# Patient Record
Sex: FEMALE | Race: WHITE | NOT HISPANIC OR LATINO | Employment: UNEMPLOYED | URBAN - METROPOLITAN AREA
[De-identification: names, ages, dates, MRNs, and addresses within clinical notes are randomized per-mention and may not be internally consistent; named-entity substitution may affect disease eponyms.]

---

## 2017-03-31 ENCOUNTER — APPOINTMENT (OUTPATIENT)
Dept: WOMENS IMAGING | Facility: HOSPITAL | Age: 57
End: 2017-03-31

## 2017-03-31 PROCEDURE — 77067 SCR MAMMO BI INCL CAD: CPT | Performed by: RADIOLOGY

## 2017-03-31 PROCEDURE — 77063 BREAST TOMOSYNTHESIS BI: CPT | Performed by: RADIOLOGY

## 2017-04-12 ENCOUNTER — APPOINTMENT (OUTPATIENT)
Dept: WOMENS IMAGING | Facility: HOSPITAL | Age: 57
End: 2017-04-12

## 2017-04-12 PROCEDURE — G0279 TOMOSYNTHESIS, MAMMO: HCPCS | Performed by: RADIOLOGY

## 2017-04-12 PROCEDURE — G0206 DX MAMMO INCL CAD UNI: HCPCS | Performed by: RADIOLOGY

## 2017-04-12 PROCEDURE — 77065 DX MAMMO INCL CAD UNI: CPT | Performed by: RADIOLOGY

## 2017-04-12 PROCEDURE — 76641 ULTRASOUND BREAST COMPLETE: CPT | Performed by: RADIOLOGY

## 2017-04-19 ENCOUNTER — APPOINTMENT (OUTPATIENT)
Dept: WOMENS IMAGING | Facility: HOSPITAL | Age: 57
End: 2017-04-19

## 2017-04-19 PROCEDURE — 76942 ECHO GUIDE FOR BIOPSY: CPT | Performed by: RADIOLOGY

## 2017-04-19 PROCEDURE — 19000 PUNCTURE ASPIR CYST BREAST: CPT | Performed by: RADIOLOGY

## 2017-05-18 ENCOUNTER — AMBULATORY SURGICAL CENTER (OUTPATIENT)
Dept: URBAN - METROPOLITAN AREA SURGERY 20 | Facility: SURGERY | Age: 57
End: 2017-05-18

## 2017-05-18 ENCOUNTER — OFFICE (OUTPATIENT)
Dept: URBAN - METROPOLITAN AREA CLINIC 64 | Facility: CLINIC | Age: 57
End: 2017-05-18

## 2017-05-18 DIAGNOSIS — Z87.19 PERSONAL HISTORY OF OTHER DISEASES OF THE DIGESTIVE SYSTEM: ICD-10-CM

## 2017-05-18 DIAGNOSIS — K22.70 BARRETT'S ESOPHAGUS WITHOUT DYSPLASIA: ICD-10-CM

## 2017-05-18 DIAGNOSIS — K29.70 GASTRITIS, UNSPECIFIED, WITHOUT BLEEDING: ICD-10-CM

## 2017-05-18 DIAGNOSIS — D12.9 BENIGN NEOPLASM OF ANUS AND ANAL CANAL: ICD-10-CM

## 2017-05-18 DIAGNOSIS — K52.9 NONINFECTIVE GASTROENTERITIS AND COLITIS, UNSPECIFIED: ICD-10-CM

## 2017-05-18 PROCEDURE — 88305 TISSUE EXAM BY PATHOLOGIST: CPT | Performed by: INTERNAL MEDICINE

## 2017-05-18 PROCEDURE — 45380 COLONOSCOPY AND BIOPSY: CPT | Performed by: INTERNAL MEDICINE

## 2017-05-19 PROBLEM — K21.9 GASTROESOPHAGEAL REFLUX DISEASE: Status: ACTIVE | Noted: 2017-05-19

## 2017-05-19 PROBLEM — K51.90 ULCERATIVE COLITIS: Status: ACTIVE | Noted: 2017-05-19

## 2017-12-18 ENCOUNTER — APPOINTMENT (OUTPATIENT)
Dept: WOMENS IMAGING | Facility: HOSPITAL | Age: 57
End: 2017-12-18

## 2017-12-18 PROCEDURE — G0279 TOMOSYNTHESIS, MAMMO: HCPCS | Performed by: RADIOLOGY

## 2017-12-18 PROCEDURE — 77065 DX MAMMO INCL CAD UNI: CPT | Performed by: RADIOLOGY

## 2017-12-18 PROCEDURE — 76641 ULTRASOUND BREAST COMPLETE: CPT | Performed by: RADIOLOGY

## 2017-12-18 PROCEDURE — G0206 DX MAMMO INCL CAD UNI: HCPCS | Performed by: RADIOLOGY

## 2017-12-18 PROCEDURE — 77061 BREAST TOMOSYNTHESIS UNI: CPT | Performed by: RADIOLOGY

## 2018-01-15 ENCOUNTER — OFFICE VISIT (OUTPATIENT)
Dept: FAMILY MEDICINE CLINIC | Facility: CLINIC | Age: 58
End: 2018-01-15

## 2018-01-15 VITALS
RESPIRATION RATE: 12 BRPM | BODY MASS INDEX: 30.53 KG/M2 | DIASTOLIC BLOOD PRESSURE: 90 MMHG | HEIGHT: 66 IN | OXYGEN SATURATION: 98 % | SYSTOLIC BLOOD PRESSURE: 142 MMHG | HEART RATE: 71 BPM | WEIGHT: 190 LBS

## 2018-01-15 DIAGNOSIS — E04.9 GOITER: Primary | ICD-10-CM

## 2018-01-15 DIAGNOSIS — K51.919 ULCERATIVE COLITIS WITH COMPLICATION, UNSPECIFIED LOCATION (HCC): ICD-10-CM

## 2018-01-15 DIAGNOSIS — Z80.8 FAMILY HISTORY OF MELANOMA: ICD-10-CM

## 2018-01-15 DIAGNOSIS — R53.83 FATIGUE, UNSPECIFIED TYPE: ICD-10-CM

## 2018-01-15 DIAGNOSIS — H93.13 TINNITUS OF BOTH EARS: ICD-10-CM

## 2018-01-15 DIAGNOSIS — Z13.1 SCREENING FOR DIABETES MELLITUS: ICD-10-CM

## 2018-01-15 DIAGNOSIS — E55.9 VITAMIN D DEFICIENCY: ICD-10-CM

## 2018-01-15 DIAGNOSIS — E78.5 HYPERLIPIDEMIA, UNSPECIFIED HYPERLIPIDEMIA TYPE: ICD-10-CM

## 2018-01-15 DIAGNOSIS — I10 ESSENTIAL HYPERTENSION: ICD-10-CM

## 2018-01-15 DIAGNOSIS — F32.A DEPRESSION, UNSPECIFIED DEPRESSION TYPE: ICD-10-CM

## 2018-01-15 LAB
25(OH)D3+25(OH)D2 SERPL-MCNC: 71.1 NG/ML (ref 30–100)
ALBUMIN SERPL-MCNC: 4.5 G/DL (ref 3.5–5.2)
ALBUMIN/GLOB SERPL: 1.4 G/DL
ALP SERPL-CCNC: 77 U/L (ref 39–117)
ALT SERPL-CCNC: 11 U/L (ref 1–33)
AST SERPL-CCNC: 18 U/L (ref 1–32)
BASOPHILS # BLD AUTO: 0.03 10*3/MM3 (ref 0–0.2)
BASOPHILS NFR BLD AUTO: 0.4 % (ref 0–1.5)
BILIRUB SERPL-MCNC: 0.3 MG/DL (ref 0.1–1.2)
BUN SERPL-MCNC: 14 MG/DL (ref 6–20)
BUN/CREAT SERPL: 16.1 (ref 7–25)
CALCIUM SERPL-MCNC: 9.7 MG/DL (ref 8.6–10.5)
CHLORIDE SERPL-SCNC: 100 MMOL/L (ref 98–107)
CHOLEST SERPL-MCNC: 201 MG/DL (ref 0–200)
CO2 SERPL-SCNC: 29.1 MMOL/L (ref 22–29)
CREAT SERPL-MCNC: 0.87 MG/DL (ref 0.57–1)
EOSINOPHIL # BLD AUTO: 0.1 10*3/MM3 (ref 0–0.7)
EOSINOPHIL NFR BLD AUTO: 1.5 % (ref 0.3–6.2)
ERYTHROCYTE [DISTWIDTH] IN BLOOD BY AUTOMATED COUNT: 13.3 % (ref 11.7–13)
FOLATE SERPL-MCNC: 14.31 NG/ML (ref 4.78–24.2)
GLOBULIN SER CALC-MCNC: 3.2 GM/DL
GLUCOSE SERPL-MCNC: 86 MG/DL (ref 65–99)
HBA1C MFR BLD: 5.11 % (ref 4.8–5.6)
HCT VFR BLD AUTO: 40.5 % (ref 35.6–45.5)
HDLC SERPL-MCNC: 48 MG/DL (ref 40–60)
HGB BLD-MCNC: 13.4 G/DL (ref 11.9–15.5)
IMM GRANULOCYTES # BLD: 0.02 10*3/MM3 (ref 0–0.03)
IMM GRANULOCYTES NFR BLD: 0.3 % (ref 0–0.5)
LDLC SERPL CALC-MCNC: 122 MG/DL (ref 0–100)
LDLC/HDLC SERPL: 2.54 {RATIO}
LYMPHOCYTES # BLD AUTO: 2.67 10*3/MM3 (ref 0.9–4.8)
LYMPHOCYTES NFR BLD AUTO: 39.5 % (ref 19.6–45.3)
MCH RBC QN AUTO: 31.5 PG (ref 26.9–32)
MCHC RBC AUTO-ENTMCNC: 33.1 G/DL (ref 32.4–36.3)
MCV RBC AUTO: 95.1 FL (ref 80.5–98.2)
MONOCYTES # BLD AUTO: 0.43 10*3/MM3 (ref 0.2–1.2)
MONOCYTES NFR BLD AUTO: 6.4 % (ref 5–12)
NEUTROPHILS # BLD AUTO: 3.51 10*3/MM3 (ref 1.9–8.1)
NEUTROPHILS NFR BLD AUTO: 51.9 % (ref 42.7–76)
PLATELET # BLD AUTO: 273 10*3/MM3 (ref 140–500)
POTASSIUM SERPL-SCNC: 4.3 MMOL/L (ref 3.5–5.2)
PROT SERPL-MCNC: 7.7 G/DL (ref 6–8.5)
RBC # BLD AUTO: 4.26 10*6/MM3 (ref 3.9–5.2)
SODIUM SERPL-SCNC: 140 MMOL/L (ref 136–145)
TRIGL SERPL-MCNC: 155 MG/DL (ref 0–150)
TSH SERPL DL<=0.005 MIU/L-ACNC: 1.37 MIU/ML (ref 0.27–4.2)
VIT B12 SERPL-MCNC: 374 PG/ML (ref 211–946)
VLDLC SERPL CALC-MCNC: 31 MG/DL (ref 5–40)
WBC # BLD AUTO: 6.76 10*3/MM3 (ref 4.5–10.7)

## 2018-01-15 PROCEDURE — 99214 OFFICE O/P EST MOD 30 MIN: CPT | Performed by: FAMILY MEDICINE

## 2018-01-15 RX ORDER — VALACYCLOVIR HYDROCHLORIDE 500 MG/1
500 TABLET, FILM COATED ORAL 2 TIMES DAILY
COMMUNITY
End: 2022-06-16 | Stop reason: SDUPTHER

## 2018-01-15 RX ORDER — LISINOPRIL 10 MG/1
10 TABLET ORAL DAILY
Qty: 30 TABLET | Refills: 1 | Status: SHIPPED | OUTPATIENT
Start: 2018-01-15 | End: 2018-02-06 | Stop reason: SINTOL

## 2018-01-15 RX ORDER — CITALOPRAM 20 MG/1
20 TABLET ORAL DAILY
Qty: 90 TABLET | Refills: 1 | Status: SHIPPED | OUTPATIENT
Start: 2018-01-15 | End: 2018-07-11 | Stop reason: SDUPTHER

## 2018-01-15 NOTE — PROGRESS NOTES
"Subjective   Rakelmara Velarde is a 57 y.o. female.     Chief Complaint   Patient presents with   • GERD   • Anxiety   • Tinnitus   • Hypertension   • Hyperlipidemia   • Establish Care       HPI       GERD:  -well controlled with Nexium  -EGD and colonoscopy UTD in May 2017 with GI, Dr. Linda Garzon  -she also has a hx of ulcerative colitis which is well controlled with Colazal    HLD:  -diagnosed a couple of years ago  -she is fasting for labs today  -diet could be better \"I have a sweet tooth\"  -no regular exercise    HTN:  -BP has juliet running high for a couple of years  -she has never taken BP medication but would be willing to start one today  -no chest pain, dizziness, headaches, or acute vision changes    Tinnitis:  -ringing and clicking in both ears  -progressive over the last few years  -associated with some difficulty with her hearing in large groups and understanding her daughters in conversation  -no ear pain or fullness    Pt reports a hx of anxiety and depression which is well controlled with Celexa 20 mg daily.  She requests a medication refill.  She reports that her marriage is strained but that she and her   are living together and trying to work it out. She spends a lot of time providing care for her grandchildren and her elderly parents.      Previous PCP, Dr. Sherin Win retired.  It has been about 1.5 yr since her last office visit.         Review of Systems   Constitutional: Negative for appetite change, fever and unexpected weight change.   HENT: Positive for hearing loss. Negative for ear discharge, ear pain, rhinorrhea and sore throat.    Eyes: Negative for pain and visual disturbance.   Respiratory: Negative for cough, chest tightness, shortness of breath and wheezing.    Cardiovascular: Negative for chest pain, palpitations and leg swelling.   Gastrointestinal: Negative for abdominal distention, abdominal pain, anal bleeding, blood in stool, constipation, diarrhea, nausea and vomiting. "   Genitourinary: Negative for dysuria, hematuria and menstrual problem.   Musculoskeletal: Negative for arthralgias and myalgias.   Skin: Negative for pallor and rash.   Neurological: Negative for dizziness and headaches.   Psychiatric/Behavioral: Negative for dysphoric mood and sleep disturbance. The patient is not nervous/anxious.        The following portions of the patient's history were reviewed and updated as appropriate: allergies, current medications, past family history, past medical history, past social history, past surgical history and problem list.    Past Medical History:   Diagnosis Date   • Angular cheilitis 2016   • Anxiety    • GERD (gastroesophageal reflux disease)    • HSV infection    • Hyperlipidemia    • Hypertension    • Obesity    • Osteopenia    • Ulcerative colitis     followed by Dr. Linda Garzon, Q2 yr colonoscopies   • Vitamin D deficiency      Past Surgical History:   Procedure Laterality Date   •  SECTION     • COLONOSCOPY  2011    Dr Garzon-redo in 3-5 years   • HYSTEROSCOPY W/ ENDOMETRIAL ABLATION     • MANDIBLE FRACTURE SURGERY     • TONSILLECTOMY     • TUBAL ABDOMINAL LIGATION     • WISDOM TOOTH EXTRACTION       Family History   Problem Relation Age of Onset   • Breast cancer Mother 50   • Emphysema Mother    • Macular degeneration Mother    • Hypertension Mother    • Hypertension Father    • Dementia Father    • Parkinsonism Father    • Depression Brother    • Esophageal cancer Brother    • Hypertension Brother    • Melanoma Brother    • Lymphoma Maternal Grandmother    • Macular degeneration Maternal Grandmother    • Macular degeneration Maternal Aunt    • Macular degeneration Maternal Uncle      Social History     Social History   • Marital status:      Social History Main Topics   • Smoking status: Never Smoker   • Smokeless tobacco: Never Used   • Alcohol use 1.8 oz/week     3 Glasses of wine per week      Comment: socially   • Drug use: No   • Sexual  activity: Not Currently       Social History Narrative    Lives at home with her .  Marriage is strained.  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          No Known Allergies     Outpatient Medications Prior to Visit   Medication Sig Dispense Refill   • balsalazide (COLAZAL) 750 MG capsule Take by mouth 3 (three) times a day.     • Calcium-Magnesium-Vitamin D (CALCIUM 500 PO) Take by mouth.     • Cholecalciferol (VITAMIN D3) 2000 UNITS tablet Take 2 tablets by mouth daily.     • estradiol (ESTRACE) 0.5 MG tablet      • medroxyPROGESTERone (PROVERA) 2.5 MG tablet Take by mouth.     • citalopram (CeleXA) 20 MG tablet Take 1 tablet by mouth daily. 90 tablet 1   • lansoprazole (PREVACID) 15 MG capsule Take 1 capsule by mouth daily. 90 capsule 2     No facility-administered medications prior to visit.        Objective     Vitals:    01/15/18 1104   BP: 142/90   Pulse: 71   Resp: 12   SpO2: 98%   BMI 30.7    Physical Exam   Constitutional: She appears well-developed and well-nourished. No distress.   HENT:   Head: Normocephalic and atraumatic.   Nose: Nose normal.   Mouth/Throat: Oropharynx is clear and moist.   Eyes: Conjunctivae are normal. Pupils are equal, round, and reactive to light.   Neck: Neck supple. Thyromegaly (diffuse) present.   Cardiovascular: Normal rate, regular rhythm, S1 normal, S2 normal and normal heart sounds.  Exam reveals no gallop and no friction rub.    No murmur heard.  Pulses:       Radial pulses are 2+ on the right side, and 2+ on the left side.   Pulmonary/Chest: Effort normal and breath sounds normal. She has no wheezes. She has no rales.   Abdominal: Soft. Bowel sounds are normal. She exhibits no distension. There is no hepatosplenomegaly. There is no tenderness. There is no rebound and no guarding.   Musculoskeletal: She exhibits no edema or deformity.   Lymphadenopathy:     She has no cervical adenopathy.        Right: No supraclavicular adenopathy  present.        Left: No supraclavicular adenopathy present.   Neurological: She is alert. She has normal strength. Gait normal.   Skin: Skin is warm and dry. No cyanosis. Nails show no clubbing.   Psychiatric: Her speech is normal and behavior is normal. Her mood appears anxious.   Nursing note and vitals reviewed.      ASSESSMENT/PLAN       Problem List Items Addressed This Visit        Cardiovascular and Mediastinum    Hyperlipidemia    Relevant Orders    Lipid Panel With LDL / HDL Ratio (Completed)    Hypertension    Relevant Medications    Trial of lisinopril (PRINIVIL,ZESTRIL) 10 MG tablet daily  Pt cautioned about risk for cough, hypotension, dizziness, angioedema, and allergic reaction with this medication.    Other Relevant Orders    Comprehensive Metabolic Panel (Completed)    TSH (Completed)    CBC & Differential (Completed)       Digestive    Ulcerative colitis & hx of angular chelitis concerning for possible nutritional deficiency    Relevant Orders    Vitamin B12 & Folate (Completed)    Vitamin D 25 Hydroxy (Completed)      Vitamin D deficiency    Relevant Orders    Vitamin D 25 Hydroxy (Completed)      Other Visit Diagnoses     Goiter    -  Primary    Relevant Orders    TSH (Completed)    Ambulatory Referral to ENT (Otolaryngology)      Depression, unspecified depression type        Relevant Medications    Refill citalopram (CeleXA) 20 MG tablet      Tinnitus of both ears        Relevant Orders    Ambulatory Referral to ENT (Otolaryngology)      Family history of melanoma        Relevant Orders    Ambulatory Referral to Dermatology      Screening for diabetes mellitus        Relevant Orders    Hemoglobin A1c (Completed)      Fatigue, unspecified type        Relevant Orders    TSH (Completed)    Hemoglobin A1c (Completed)    CBC & Differential (Completed)        Available records reviewed via EPIC:  - mood stable on celexa since 2012  - total cholesterol, LDL, and triglycerides elevated in 2015  -colon  "polyp removed during colonoscopy in May 2017    Patient Instructions   Check your blood pressure at least once a week and keep a log for review at your next appointment.      Try taking a daily multivitamin.    DASH Eating Plan  DASH stands for \"Dietary Approaches to Stop Hypertension.\" The DASH eating plan is a healthy eating plan that has been shown to reduce high blood pressure (hypertension). Additional health benefits may include reducing the risk of type 2 diabetes mellitus, heart disease, and stroke. The DASH eating plan may also help with weight loss.  What do I need to know about the DASH eating plan?  For the DASH eating plan, you will follow these general guidelines:  · Choose foods with less than 150 milligrams of sodium per serving (as listed on the food label).  · Use salt-free seasonings or herbs instead of table salt or sea salt.  · Check with your health care provider or pharmacist before using salt substitutes.  · Eat lower-sodium products. These are often labeled as \"low-sodium\" or \"no salt added.\"  · Eat fresh foods. Avoid eating a lot of canned foods.  · Eat more vegetables, fruits, and low-fat dairy products.  · Choose whole grains. Look for the word \"whole\" as the first word in the ingredient list.  · Choose fish and skinless chicken or turkey more often than red meat. Limit fish, poultry, and meat to 6 oz (170 g) each day.  · Limit sweets, desserts, sugars, and sugary drinks.  · Choose heart-healthy fats.  · Eat more home-cooked food and less restaurant, buffet, and fast food.  · Limit fried foods.  · Do not ryder foods. Cook foods using methods such as baking, boiling, grilling, and broiling instead.  · When eating at a restaurant, ask that your food be prepared with less salt, or no salt if possible.  What foods can I eat?  Seek help from a dietitian for individual calorie needs.  Grains   Whole grain or whole wheat bread. Brown rice. Whole grain or whole wheat pasta. Quinoa, bulgur, and " whole grain cereals. Low-sodium cereals. Corn or whole wheat flour tortillas. Whole grain cornbread. Whole grain crackers. Low-sodium crackers.  Vegetables   Fresh or frozen vegetables (raw, steamed, roasted, or grilled). Low-sodium or reduced-sodium tomato and vegetable juices. Low-sodium or reduced-sodium tomato sauce and paste. Low-sodium or reduced-sodium canned vegetables.  Fruits   All fresh, canned (in natural juice), or frozen fruits.  Meat and Other Protein Products   Ground beef (85% or leaner), grass-fed beef, or beef trimmed of fat. Skinless chicken or turkey. Ground chicken or turkey. Pork trimmed of fat. All fish and seafood. Eggs. Dried beans, peas, or lentils. Unsalted nuts and seeds. Unsalted canned beans.  Dairy   Low-fat dairy products, such as skim or 1% milk, 2% or reduced-fat cheeses, low-fat ricotta or cottage cheese, or plain low-fat yogurt. Low-sodium or reduced-sodium cheeses.  Fats and Oils   Tub margarines without trans fats. Light or reduced-fat mayonnaise and salad dressings (reduced sodium). Avocado. Safflower, olive, or canola oils. Natural peanut or almond butter.  Other   Unsalted popcorn and pretzels.  The items listed above may not be a complete list of recommended foods or beverages. Contact your dietitian for more options.   What foods are not recommended?  Grains   White bread. White pasta. White rice. Refined cornbread. Bagels and croissants. Crackers that contain trans fat.  Vegetables   Creamed or fried vegetables. Vegetables in a cheese sauce. Regular canned vegetables. Regular canned tomato sauce and paste. Regular tomato and vegetable juices.  Fruits   Canned fruit in light or heavy syrup. Fruit juice.  Meat and Other Protein Products   Fatty cuts of meat. Ribs, chicken wings, chicas, sausage, bologna, salami, chitterlings, fatback, hot dogs, bratwurst, and packaged luncheon meats. Salted nuts and seeds. Canned beans with salt.  Dairy   Whole or 2% milk, cream,  half-and-half, and cream cheese. Whole-fat or sweetened yogurt. Full-fat cheeses or blue cheese. Nondairy creamers and whipped toppings. Processed cheese, cheese spreads, or cheese curds.  Condiments   Onion and garlic salt, seasoned salt, table salt, and sea salt. Canned and packaged gravies. Worcestershire sauce. Tartar sauce. Barbecue sauce. Teriyaki sauce. Soy sauce, including reduced sodium. Steak sauce. Fish sauce. Oyster sauce. Cocktail sauce. Horseradish. Ketchup and mustard. Meat flavorings and tenderizers. Bouillon cubes. Hot sauce. Tabasco sauce. Marinades. Taco seasonings. Relishes.  Fats and Oils   Butter, stick margarine, lard, shortening, ghee, and chicas fat. Coconut, palm kernel, or palm oils. Regular salad dressings.  Other   Pickles and olives. Salted popcorn and pretzels.  The items listed above may not be a complete list of foods and beverages to avoid. Contact your dietitian for more information.   Where can I find more information?  National Heart, Lung, and Blood [place]: www.nhlbi.nih.gov/health/health-topics/topics/dash/  This information is not intended to replace advice given to you by your health care provider. Make sure you discuss any questions you have with your health care provider.  Document Released: 12/06/2012 Document Revised: 05/25/2017 Document Reviewed: 10/22/2014  [employer] Interactive Patient Education © 2017 [employer]      Return in about 1 month (around 2/15/2018) for Recheck HTN.      Luana Angel MD  01/16/18

## 2018-01-15 NOTE — PATIENT INSTRUCTIONS
"Check your blood pressure at least once a week and keep a log for review at your next appointment.      Try taking a daily multivitamin.    DASH Eating Plan  DASH stands for \"Dietary Approaches to Stop Hypertension.\" The DASH eating plan is a healthy eating plan that has been shown to reduce high blood pressure (hypertension). Additional health benefits may include reducing the risk of type 2 diabetes mellitus, heart disease, and stroke. The DASH eating plan may also help with weight loss.  What do I need to know about the DASH eating plan?  For the DASH eating plan, you will follow these general guidelines:  · Choose foods with less than 150 milligrams of sodium per serving (as listed on the food label).  · Use salt-free seasonings or herbs instead of table salt or sea salt.  · Check with your health care provider or pharmacist before using salt substitutes.  · Eat lower-sodium products. These are often labeled as \"low-sodium\" or \"no salt added.\"  · Eat fresh foods. Avoid eating a lot of canned foods.  · Eat more vegetables, fruits, and low-fat dairy products.  · Choose whole grains. Look for the word \"whole\" as the first word in the ingredient list.  · Choose fish and skinless chicken or turkey more often than red meat. Limit fish, poultry, and meat to 6 oz (170 g) each day.  · Limit sweets, desserts, sugars, and sugary drinks.  · Choose heart-healthy fats.  · Eat more home-cooked food and less restaurant, buffet, and fast food.  · Limit fried foods.  · Do not ryder foods. Cook foods using methods such as baking, boiling, grilling, and broiling instead.  · When eating at a restaurant, ask that your food be prepared with less salt, or no salt if possible.  What foods can I eat?  Seek help from a dietitian for individual calorie needs.  Grains   Whole grain or whole wheat bread. Brown rice. Whole grain or whole wheat pasta. Quinoa, bulgur, and whole grain cereals. Low-sodium cereals. Corn or whole wheat flour " tortillas. Whole grain cornbread. Whole grain crackers. Low-sodium crackers.  Vegetables   Fresh or frozen vegetables (raw, steamed, roasted, or grilled). Low-sodium or reduced-sodium tomato and vegetable juices. Low-sodium or reduced-sodium tomato sauce and paste. Low-sodium or reduced-sodium canned vegetables.  Fruits   All fresh, canned (in natural juice), or frozen fruits.  Meat and Other Protein Products   Ground beef (85% or leaner), grass-fed beef, or beef trimmed of fat. Skinless chicken or turkey. Ground chicken or turkey. Pork trimmed of fat. All fish and seafood. Eggs. Dried beans, peas, or lentils. Unsalted nuts and seeds. Unsalted canned beans.  Dairy   Low-fat dairy products, such as skim or 1% milk, 2% or reduced-fat cheeses, low-fat ricotta or cottage cheese, or plain low-fat yogurt. Low-sodium or reduced-sodium cheeses.  Fats and Oils   Tub margarines without trans fats. Light or reduced-fat mayonnaise and salad dressings (reduced sodium). Avocado. Safflower, olive, or canola oils. Natural peanut or almond butter.  Other   Unsalted popcorn and pretzels.  The items listed above may not be a complete list of recommended foods or beverages. Contact your dietitian for more options.   What foods are not recommended?  Grains   White bread. White pasta. White rice. Refined cornbread. Bagels and croissants. Crackers that contain trans fat.  Vegetables   Creamed or fried vegetables. Vegetables in a cheese sauce. Regular canned vegetables. Regular canned tomato sauce and paste. Regular tomato and vegetable juices.  Fruits   Canned fruit in light or heavy syrup. Fruit juice.  Meat and Other Protein Products   Fatty cuts of meat. Ribs, chicken wings, chicas, sausage, bologna, salami, chitterlings, fatback, hot dogs, bratwurst, and packaged luncheon meats. Salted nuts and seeds. Canned beans with salt.  Dairy   Whole or 2% milk, cream, half-and-half, and cream cheese. Whole-fat or sweetened yogurt. Full-fat  cheeses or blue cheese. Nondairy creamers and whipped toppings. Processed cheese, cheese spreads, or cheese curds.  Condiments   Onion and garlic salt, seasoned salt, table salt, and sea salt. Canned and packaged gravies. Worcestershire sauce. Tartar sauce. Barbecue sauce. Teriyaki sauce. Soy sauce, including reduced sodium. Steak sauce. Fish sauce. Oyster sauce. Cocktail sauce. Horseradish. Ketchup and mustard. Meat flavorings and tenderizers. Bouillon cubes. Hot sauce. Tabasco sauce. Marinades. Taco seasonings. Relishes.  Fats and Oils   Butter, stick margarine, lard, shortening, ghee, and chicas fat. Coconut, palm kernel, or palm oils. Regular salad dressings.  Other   Pickles and olives. Salted popcorn and pretzels.  The items listed above may not be a complete list of foods and beverages to avoid. Contact your dietitian for more information.   Where can I find more information?  National Heart, Lung, and Blood [place]: www.nhlbi.nih.gov/health/health-topics/topics/dash/  This information is not intended to replace advice given to you by your health care provider. Make sure you discuss any questions you have with your health care provider.  Document Released: 12/06/2012 Document Revised: 05/25/2017 Document Reviewed: 10/22/2014  [employer] Interactive Patient Education © 2017 [employer] Inc.

## 2018-01-16 ENCOUNTER — TELEPHONE (OUTPATIENT)
Dept: FAMILY MEDICINE CLINIC | Facility: CLINIC | Age: 58
End: 2018-01-16

## 2018-01-16 NOTE — TELEPHONE ENCOUNTER
Called and spoke with patient to let her know her cholesterol was up a little bit and she needs to work on diet and exercise. Her other labs are good. She understands and accepts.     ----- Message from Luana Angel MD sent at 1/16/2018  9:51 AM EST -----  Please contact pt and let her know that her cholesterol is a little high, but not to the degree that she needs medication at this time.   Exercising regularly and eating a healthy diet rich in fresh produce, fiber, and lean protein but low in added sugar and fat can help manage this problem and reduce the risk for heart disease in the future.    Her blood sugar, kidney function, liver enzymes, vitamin B12, folate, vitamin D, blood counts, and thyroid hormone were normal.

## 2018-02-05 ENCOUNTER — TELEPHONE (OUTPATIENT)
Dept: FAMILY MEDICINE CLINIC | Facility: CLINIC | Age: 58
End: 2018-02-05

## 2018-02-05 NOTE — TELEPHONE ENCOUNTER
Patient left a VM stating that she has had a horrible cough since starting the Lisinopril and would like to know if there is something else you would recommend for her.

## 2018-02-06 DIAGNOSIS — I10 ESSENTIAL HYPERTENSION: Primary | ICD-10-CM

## 2018-02-06 RX ORDER — AMLODIPINE BESYLATE 5 MG/1
5 TABLET ORAL DAILY
Qty: 30 TABLET | Refills: 1 | Status: SHIPPED | OUTPATIENT
Start: 2018-02-06 | End: 2018-02-19 | Stop reason: SDUPTHER

## 2018-02-06 NOTE — TELEPHONE ENCOUNTER
Please call her back and advise her STOP taking the Lisinopril immediately.  I will call in a prescription for Amlodipine instead, which is a once daily BP medication.

## 2018-02-06 NOTE — TELEPHONE ENCOUNTER
Called and spoke with patient to let her know to stop the Lisinopril and we have sent in a new Rx for her. She understands and accepts.

## 2018-02-19 ENCOUNTER — OFFICE VISIT (OUTPATIENT)
Dept: FAMILY MEDICINE CLINIC | Facility: CLINIC | Age: 58
End: 2018-02-19

## 2018-02-19 VITALS
BODY MASS INDEX: 31.18 KG/M2 | DIASTOLIC BLOOD PRESSURE: 82 MMHG | RESPIRATION RATE: 13 BRPM | WEIGHT: 194 LBS | HEIGHT: 66 IN | HEART RATE: 76 BPM | OXYGEN SATURATION: 99 % | SYSTOLIC BLOOD PRESSURE: 130 MMHG

## 2018-02-19 DIAGNOSIS — I10 ESSENTIAL HYPERTENSION: ICD-10-CM

## 2018-02-19 PROCEDURE — 99213 OFFICE O/P EST LOW 20 MIN: CPT | Performed by: FAMILY MEDICINE

## 2018-02-19 RX ORDER — FLUOROURACIL 50 MG/G
CREAM TOPICAL
Refills: 0 | COMMUNITY
Start: 2018-01-30 | End: 2018-12-06

## 2018-02-19 RX ORDER — CLINDAMYCIN PHOSPHATE 10 UG/ML
LOTION TOPICAL
Refills: 3 | COMMUNITY
Start: 2018-01-30 | End: 2018-12-06

## 2018-02-19 RX ORDER — AMLODIPINE BESYLATE 10 MG/1
10 TABLET ORAL DAILY
Qty: 30 TABLET | Refills: 1 | Status: SHIPPED | OUTPATIENT
Start: 2018-02-19 | End: 2018-04-02 | Stop reason: SINTOL

## 2018-02-19 NOTE — PROGRESS NOTES
Carlos Velarde is a 57 y.o. female.     Chief Complaint   Patient presents with   • Hypertension     Follow Up       HPI          HTN:  -she did not tolerate Lisinopril due to cough   -she is tolerating Amlodipine 5 mg daily for the last 2 weeks  -BP has been running 130s-150s/700s-80s  -occasional light headed sensation if BP is too high  -NO syncope, falls, chest pain, or vision changes  -starting to exercise on the elliptical more often  -trying to walk more as weather improves  -working on eating healthier, cutting back on sweets    Review of Systems   Constitutional: Negative for fatigue and fever.   HENT: Negative for congestion and sore throat.    Eyes: Negative for pain and visual disturbance.   Respiratory: Negative for cough and shortness of breath.    Cardiovascular: Negative for chest pain, palpitations and leg swelling.   Gastrointestinal: Negative for abdominal pain, blood in stool, constipation, diarrhea and nausea.   Neurological: Positive for light-headedness (occasional, mild, associated with position changes). Negative for dizziness, syncope (no falls), weakness and headaches.   Psychiatric/Behavioral: Negative for dysphoric mood. The patient is not nervous/anxious.        The following portions of the patient's history were reviewed and updated as appropriate: allergies, current medications, past family history, past medical history, past social history, past surgical history and problem list.    Past Medical History:   Diagnosis Date   • Angular cheilitis 1/17/2016   • Anxiety    • GERD (gastroesophageal reflux disease)    • HSV infection     followed by OB/GYN, Dr. Maria Antonia Gray   • Hyperlipidemia    • Hypertension    • Obesity    • Osteopenia    • Ulcerative colitis     followed by Dr. Linda Garzon, Q2 yr colonoscopies   • Vitamin D deficiency      Social History     Social History   • Marital status:      Social History Main Topics   • Smoking status: Never Smoker   •  Smokeless tobacco: Never Used   • Alcohol use 1.8 oz/week     3 Glasses of wine per week      Comment: socially   • Drug use: No     Social History Narrative    Lives at home with her .  Marriage is strained.  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          Allergies   Allergen Reactions   • Lisinopril Cough        Outpatient Medications Prior to Visit   Medication Sig Dispense Refill   • balsalazide (COLAZAL) 750 MG capsule Take by mouth 3 (three) times a day.     • Calcium-Magnesium-Vitamin D (CALCIUM 500 PO) Take by mouth.     • Cholecalciferol (VITAMIN D3) 2000 UNITS tablet Take 2 tablets by mouth daily.     • citalopram (CeleXA) 20 MG tablet Take 1 tablet by mouth Daily. 90 tablet 1   • esomeprazole (nexIUM) 20 MG capsule Take 20 mg by mouth Every Morning Before Breakfast.     • estradiol (ESTRACE) 0.5 MG tablet      • medroxyPROGESTERone (PROVERA) 2.5 MG tablet Take by mouth.     • valACYclovir (VALTREX) 500 MG tablet Take 500 mg by mouth 2 (Two) Times a Day.     • amLODIPine (NORVASC) 5 MG tablet Take 1 tablet by mouth Daily. 30 tablet 1     No facility-administered medications prior to visit.        Objective     Vitals:    02/19/18 1118   BP: 130/82   Pulse: 76   Resp: 13   SpO2: 99%   BMI 31.3    Physical Exam   Constitutional: She appears well-developed and well-nourished. No distress.   HENT:   Head: Normocephalic and atraumatic.   Cardiovascular: Normal rate, regular rhythm and normal heart sounds.  Exam reveals no gallop and no friction rub.    No murmur heard.  Pulses:       Radial pulses are 2+ on the right side, and 2+ on the left side.   Pulmonary/Chest: Effort normal and breath sounds normal. No respiratory distress. She has no wheezes. She has no rhonchi. She has no rales.   Abdominal: Soft. Bowel sounds are normal. She exhibits no distension. There is no tenderness.   Skin: Skin is warm and dry.   Psychiatric: She has a normal mood and affect. Her  "behavior is normal.       ASSESSMENT/PLAN       Problem List Items Addressed This Visit        Cardiovascular and Mediastinum    Hypertension, inadequately controlled    Relevant Medications    Increase amLODIPine (NORVASC) from 5 to 10 MG daily  Continue daily home BP log  Call to report BP in 1 month, and if persistently elevated with call in Rx for HCTZ at that time        Plan for repeat labs in June to include CMP and fasting lipids as well as routine screening for Hep C    Patient Instructions   Try to exercise at least 3 times per week for 30 minutes per session.  Ideally you would get 30 minutes of exercise per day.      If blood pressure consistently greater than 120/80 after 1 month on Amlodipine 10 mg daily, call and leave a message for me.  I will call in an additional blood pressure medication if needed at that time.        DASH Eating Plan  DASH stands for \"Dietary Approaches to Stop Hypertension.\" The DASH eating plan is a healthy eating plan that has been shown to reduce high blood pressure (hypertension). It may also reduce your risk for type 2 diabetes, heart disease, and stroke. The DASH eating plan may also help with weight loss.  What are tips for following this plan?  General guidelines   · Avoid eating more than 2,300 mg (milligrams) of salt (sodium) a day. If you have hypertension, you may need to reduce your sodium intake to 1,500 mg a day.  · Limit alcohol intake to no more than 1 drink a day for nonpregnant women and 2 drinks a day for men. One drink equals 12 oz of beer, 5 oz of wine, or 1½ oz of hard liquor.  · Work with your health care provider to maintain a healthy body weight or to lose weight. Ask what an ideal weight is for you.  · Get at least 30 minutes of exercise that causes your heart to beat faster (aerobic exercise) most days of the week. Activities may include walking, swimming, or biking.  · Work with your health care provider or diet and nutrition specialist (dietitian) " "to adjust your eating plan to your individual calorie needs.  Reading food labels   · Check food labels for the amount of sodium per serving. Choose foods with less than 5 percent of the Daily Value of sodium. Generally, foods with less than 300 mg of sodium per serving fit into this eating plan.  · To find whole grains, look for the word \"whole\" as the first word in the ingredient list.  Shopping   · Buy products labeled as \"low-sodium\" or \"no salt added.\"  · Buy fresh foods. Avoid canned foods and premade or frozen meals.  Cooking   · Avoid adding salt when cooking. Use salt-free seasonings or herbs instead of table salt or sea salt. Check with your health care provider or pharmacist before using salt substitutes.  · Do not ryder foods. Cook foods using healthy methods such as baking, boiling, grilling, and broiling instead.  · Cook with heart-healthy oils, such as olive, canola, soybean, or sunflower oil.  Meal planning     · Eat a balanced diet that includes:  ¨ 5 or more servings of fruits and vegetables each day. At each meal, try to fill half of your plate with fruits and vegetables.  ¨ Up to 6-8 servings of whole grains each day.  ¨ Less than 6 oz of lean meat, poultry, or fish each day. A 3-oz serving of meat is about the same size as a deck of cards. One egg equals 1 oz.  ¨ 2 servings of low-fat dairy each day.  ¨ A serving of nuts, seeds, or beans 5 times each week.  ¨ Heart-healthy fats. Healthy fats called Omega-3 fatty acids are found in foods such as flaxseeds and coldwater fish, like sardines, salmon, and mackerel.  · Limit how much you eat of the following:  ¨ Canned or prepackaged foods.  ¨ Food that is high in trans fat, such as fried foods.  ¨ Food that is high in saturated fat, such as fatty meat.  ¨ Sweets, desserts, sugary drinks, and other foods with added sugar.  ¨ Full-fat dairy products.  · Do not salt foods before eating.  · Try to eat at least 2 vegetarian meals each week.  · Eat more " home-cooked food and less restaurant, buffet, and fast food.  · When eating at a restaurant, ask that your food be prepared with less salt or no salt, if possible.  What foods are recommended?  The items listed may not be a complete list. Talk with your dietitian about what dietary choices are best for you.  Grains   Whole-grain or whole-wheat bread. Whole-grain or whole-wheat pasta. Brown rice. Oatmeal. Quinoa. Bulgur. Whole-grain and low-sodium cereals. Janell bread. Low-fat, low-sodium crackers. Whole-wheat flour tortillas.  Vegetables   Fresh or frozen vegetables (raw, steamed, roasted, or grilled). Low-sodium or reduced-sodium tomato and vegetable juice. Low-sodium or reduced-sodium tomato sauce and tomato paste. Low-sodium or reduced-sodium canned vegetables.  Fruits   All fresh, dried, or frozen fruit. Canned fruit in natural juice (without added sugar).  Meat and other protein foods   Skinless chicken or turkey. Ground chicken or turkey. Pork with fat trimmed off. Fish and seafood. Egg whites. Dried beans, peas, or lentils. Unsalted nuts, nut butters, and seeds. Unsalted canned beans. Lean cuts of beef with fat trimmed off. Low-sodium, lean deli meat.  Dairy   Low-fat (1%) or fat-free (skim) milk. Fat-free, low-fat, or reduced-fat cheeses. Nonfat, low-sodium ricotta or cottage cheese. Low-fat or nonfat yogurt. Low-fat, low-sodium cheese.  Fats and oils   Soft margarine without trans fats. Vegetable oil. Low-fat, reduced-fat, or light mayonnaise and salad dressings (reduced-sodium). Canola, safflower, olive, soybean, and sunflower oils. Avocado.  Seasoning and other foods   Herbs. Spices. Seasoning mixes without salt. Unsalted popcorn and pretzels. Fat-free sweets.  What foods are not recommended?  The items listed may not be a complete list. Talk with your dietitian about what dietary choices are best for you.  Grains   Baked goods made with fat, such as croissants, muffins, or some breads. Dry pasta or rice  meal packs.  Vegetables   Creamed or fried vegetables. Vegetables in a cheese sauce. Regular canned vegetables (not low-sodium or reduced-sodium). Regular canned tomato sauce and paste (not low-sodium or reduced-sodium). Regular tomato and vegetable juice (not low-sodium or reduced-sodium). Pickles. Olives.  Fruits   Canned fruit in a light or heavy syrup. Fried fruit. Fruit in cream or butter sauce.  Meat and other protein foods   Fatty cuts of meat. Ribs. Fried meat. Valentino. Sausage. Bologna and other processed lunch meats. Salami. Fatback. Hotdogs. Bratwurst. Salted nuts and seeds. Canned beans with added salt. Canned or smoked fish. Whole eggs or egg yolks. Chicken or turkey with skin.  Dairy   Whole or 2% milk, cream, and half-and-half. Whole or full-fat cream cheese. Whole-fat or sweetened yogurt. Full-fat cheese. Nondairy creamers. Whipped toppings. Processed cheese and cheese spreads.  Fats and oils   Butter. Stick margarine. Lard. Shortening. Ghee. Valentino fat. Tropical oils, such as coconut, palm kernel, or palm oil.  Seasoning and other foods   Salted popcorn and pretzels. Onion salt, garlic salt, seasoned salt, table salt, and sea salt. Worcestershire sauce. Tartar sauce. Barbecue sauce. Teriyaki sauce. Soy sauce, including reduced-sodium. Steak sauce. Canned and packaged gravies. Fish sauce. Oyster sauce. Cocktail sauce. Horseradish that you find on the shelf. Ketchup. Mustard. Meat flavorings and tenderizers. Bouillon cubes. Hot sauce and Tabasco sauce. Premade or packaged marinades. Premade or packaged taco seasonings. Relishes. Regular salad dressings.  Where to find more information:  · National Heart, Lung, and Blood [place]: www.nhlbi.nih.gov  · American Heart Association: www.heart.org  Summary  · The DASH eating plan is a healthy eating plan that has been shown to reduce high blood pressure (hypertension). It may also reduce your risk for type 2 diabetes, heart disease, and stroke.  · With the  DASH eating plan, you should limit salt (sodium) intake to 2,300 mg a day. If you have hypertension, you may need to reduce your sodium intake to 1,500 mg a day.  · When on the DASH eating plan, aim to eat more fresh fruits and vegetables, whole grains, lean proteins, low-fat dairy, and heart-healthy fats.  · Work with your health care provider or diet and nutrition specialist (dietitian) to adjust your eating plan to your individual calorie needs.  This information is not intended to replace advice given to you by your health care provider. Make sure you discuss any questions you have with your health care provider.  Document Released: 12/06/2012 Document Revised: 12/11/2017 Document Reviewed: 12/11/2017  [employer] Interactive Patient Education © 2017 [employer]            Return in about 4 months (around 6/19/2018).      Luana Angel MD  02/19/18

## 2018-02-19 NOTE — PATIENT INSTRUCTIONS
"Try to exercise at least 3 times per week for 30 minutes per session.  Ideally you would get 30 minutes of exercise per day.      If blood pressure consistently greater than 120/80 after 1 month on Amlodipine 10 mg daily, call and leave a message for me.  I will call in an additional blood pressure medication if needed at that time.        DASH Eating Plan  DASH stands for \"Dietary Approaches to Stop Hypertension.\" The DASH eating plan is a healthy eating plan that has been shown to reduce high blood pressure (hypertension). It may also reduce your risk for type 2 diabetes, heart disease, and stroke. The DASH eating plan may also help with weight loss.  What are tips for following this plan?  General guidelines   · Avoid eating more than 2,300 mg (milligrams) of salt (sodium) a day. If you have hypertension, you may need to reduce your sodium intake to 1,500 mg a day.  · Limit alcohol intake to no more than 1 drink a day for nonpregnant women and 2 drinks a day for men. One drink equals 12 oz of beer, 5 oz of wine, or 1½ oz of hard liquor.  · Work with your health care provider to maintain a healthy body weight or to lose weight. Ask what an ideal weight is for you.  · Get at least 30 minutes of exercise that causes your heart to beat faster (aerobic exercise) most days of the week. Activities may include walking, swimming, or biking.  · Work with your health care provider or diet and nutrition specialist (dietitian) to adjust your eating plan to your individual calorie needs.  Reading food labels   · Check food labels for the amount of sodium per serving. Choose foods with less than 5 percent of the Daily Value of sodium. Generally, foods with less than 300 mg of sodium per serving fit into this eating plan.  · To find whole grains, look for the word \"whole\" as the first word in the ingredient list.  Shopping   · Buy products labeled as \"low-sodium\" or \"no salt added.\"  · Buy fresh foods. Avoid canned foods and " premade or frozen meals.  Cooking   · Avoid adding salt when cooking. Use salt-free seasonings or herbs instead of table salt or sea salt. Check with your health care provider or pharmacist before using salt substitutes.  · Do not ryder foods. Cook foods using healthy methods such as baking, boiling, grilling, and broiling instead.  · Cook with heart-healthy oils, such as olive, canola, soybean, or sunflower oil.  Meal planning     · Eat a balanced diet that includes:  ¨ 5 or more servings of fruits and vegetables each day. At each meal, try to fill half of your plate with fruits and vegetables.  ¨ Up to 6-8 servings of whole grains each day.  ¨ Less than 6 oz of lean meat, poultry, or fish each day. A 3-oz serving of meat is about the same size as a deck of cards. One egg equals 1 oz.  ¨ 2 servings of low-fat dairy each day.  ¨ A serving of nuts, seeds, or beans 5 times each week.  ¨ Heart-healthy fats. Healthy fats called Omega-3 fatty acids are found in foods such as flaxseeds and coldwater fish, like sardines, salmon, and mackerel.  · Limit how much you eat of the following:  ¨ Canned or prepackaged foods.  ¨ Food that is high in trans fat, such as fried foods.  ¨ Food that is high in saturated fat, such as fatty meat.  ¨ Sweets, desserts, sugary drinks, and other foods with added sugar.  ¨ Full-fat dairy products.  · Do not salt foods before eating.  · Try to eat at least 2 vegetarian meals each week.  · Eat more home-cooked food and less restaurant, buffet, and fast food.  · When eating at a restaurant, ask that your food be prepared with less salt or no salt, if possible.  What foods are recommended?  The items listed may not be a complete list. Talk with your dietitian about what dietary choices are best for you.  Grains   Whole-grain or whole-wheat bread. Whole-grain or whole-wheat pasta. Brown rice. Oatmeal. Quinoa. Bulgur. Whole-grain and low-sodium cereals. Janell bread. Low-fat, low-sodium crackers.  Whole-wheat flour tortillas.  Vegetables   Fresh or frozen vegetables (raw, steamed, roasted, or grilled). Low-sodium or reduced-sodium tomato and vegetable juice. Low-sodium or reduced-sodium tomato sauce and tomato paste. Low-sodium or reduced-sodium canned vegetables.  Fruits   All fresh, dried, or frozen fruit. Canned fruit in natural juice (without added sugar).  Meat and other protein foods   Skinless chicken or turkey. Ground chicken or turkey. Pork with fat trimmed off. Fish and seafood. Egg whites. Dried beans, peas, or lentils. Unsalted nuts, nut butters, and seeds. Unsalted canned beans. Lean cuts of beef with fat trimmed off. Low-sodium, lean deli meat.  Dairy   Low-fat (1%) or fat-free (skim) milk. Fat-free, low-fat, or reduced-fat cheeses. Nonfat, low-sodium ricotta or cottage cheese. Low-fat or nonfat yogurt. Low-fat, low-sodium cheese.  Fats and oils   Soft margarine without trans fats. Vegetable oil. Low-fat, reduced-fat, or light mayonnaise and salad dressings (reduced-sodium). Canola, safflower, olive, soybean, and sunflower oils. Avocado.  Seasoning and other foods   Herbs. Spices. Seasoning mixes without salt. Unsalted popcorn and pretzels. Fat-free sweets.  What foods are not recommended?  The items listed may not be a complete list. Talk with your dietitian about what dietary choices are best for you.  Grains   Baked goods made with fat, such as croissants, muffins, or some breads. Dry pasta or rice meal packs.  Vegetables   Creamed or fried vegetables. Vegetables in a cheese sauce. Regular canned vegetables (not low-sodium or reduced-sodium). Regular canned tomato sauce and paste (not low-sodium or reduced-sodium). Regular tomato and vegetable juice (not low-sodium or reduced-sodium). Pickles. Olives.  Fruits   Canned fruit in a light or heavy syrup. Fried fruit. Fruit in cream or butter sauce.  Meat and other protein foods   Fatty cuts of meat. Ribs. Fried meat. Valentino. Sausage. Bologna and  other processed lunch meats. Salami. Fatback. Hotdogs. Bratwurst. Salted nuts and seeds. Canned beans with added salt. Canned or smoked fish. Whole eggs or egg yolks. Chicken or turkey with skin.  Dairy   Whole or 2% milk, cream, and half-and-half. Whole or full-fat cream cheese. Whole-fat or sweetened yogurt. Full-fat cheese. Nondairy creamers. Whipped toppings. Processed cheese and cheese spreads.  Fats and oils   Butter. Stick margarine. Lard. Shortening. Ghee. Valentino fat. Tropical oils, such as coconut, palm kernel, or palm oil.  Seasoning and other foods   Salted popcorn and pretzels. Onion salt, garlic salt, seasoned salt, table salt, and sea salt. Worcestershire sauce. Tartar sauce. Barbecue sauce. Teriyaki sauce. Soy sauce, including reduced-sodium. Steak sauce. Canned and packaged gravies. Fish sauce. Oyster sauce. Cocktail sauce. Horseradish that you find on the shelf. Ketchup. Mustard. Meat flavorings and tenderizers. Bouillon cubes. Hot sauce and Tabasco sauce. Premade or packaged marinades. Premade or packaged taco seasonings. Relishes. Regular salad dressings.  Where to find more information:  · National Heart, Lung, and Blood [place]: www.nhlbi.nih.gov  · American Heart Association: www.heart.org  Summary  · The DASH eating plan is a healthy eating plan that has been shown to reduce high blood pressure (hypertension). It may also reduce your risk for type 2 diabetes, heart disease, and stroke.  · With the DASH eating plan, you should limit salt (sodium) intake to 2,300 mg a day. If you have hypertension, you may need to reduce your sodium intake to 1,500 mg a day.  · When on the DASH eating plan, aim to eat more fresh fruits and vegetables, whole grains, lean proteins, low-fat dairy, and heart-healthy fats.  · Work with your health care provider or diet and nutrition specialist (dietitian) to adjust your eating plan to your individual calorie needs.  This information is not intended to replace advice  given to you by your health care provider. Make sure you discuss any questions you have with your health care provider.  Document Released: 12/06/2012 Document Revised: 12/11/2017 Document Reviewed: 12/11/2017  [employer] Interactive Patient Education © 2017 [employer]

## 2018-04-02 ENCOUNTER — TELEPHONE (OUTPATIENT)
Dept: FAMILY MEDICINE CLINIC | Facility: CLINIC | Age: 58
End: 2018-04-02

## 2018-04-02 DIAGNOSIS — I10 ESSENTIAL HYPERTENSION: Primary | ICD-10-CM

## 2018-04-02 RX ORDER — HYDROCHLOROTHIAZIDE 12.5 MG/1
12.5 CAPSULE, GELATIN COATED ORAL DAILY
Qty: 30 CAPSULE | Refills: 1 | Status: SHIPPED | OUTPATIENT
Start: 2018-04-02 | End: 2018-05-23 | Stop reason: SDUPTHER

## 2018-04-02 NOTE — TELEPHONE ENCOUNTER
Pt's call returned.  She reports painful LE edema since starting Amlodipine 1 month ago.  Previously she did not tolerate Lisinopril due to cough.  She has never taken a diuretic before.        Pt advised to D/C Amlodipine and start HCTZ 12.5 mg daily.  Continue home BP log.  BMP in 2-3 weeks.

## 2018-04-02 NOTE — TELEPHONE ENCOUNTER
Patient left a VM stating that after starting amlodipine a month ago, she has noticed ankle swelling and it hinders her daily activities.  She would like to know if she needs to be switched to something else or add a second medication to combat the swelling.

## 2018-04-10 ENCOUNTER — APPOINTMENT (OUTPATIENT)
Dept: WOMENS IMAGING | Facility: HOSPITAL | Age: 58
End: 2018-04-10

## 2018-04-10 PROCEDURE — 77067 SCR MAMMO BI INCL CAD: CPT | Performed by: RADIOLOGY

## 2018-04-10 PROCEDURE — 77063 BREAST TOMOSYNTHESIS BI: CPT | Performed by: RADIOLOGY

## 2018-04-16 ENCOUNTER — LAB (OUTPATIENT)
Dept: FAMILY MEDICINE CLINIC | Facility: CLINIC | Age: 58
End: 2018-04-16

## 2018-04-16 DIAGNOSIS — I10 ESSENTIAL HYPERTENSION: ICD-10-CM

## 2018-04-16 LAB
BUN SERPL-MCNC: 18 MG/DL (ref 6–20)
BUN/CREAT SERPL: 24 (ref 7–25)
CALCIUM SERPL-MCNC: 9.7 MG/DL (ref 8.6–10.5)
CHLORIDE SERPL-SCNC: 97 MMOL/L (ref 98–107)
CO2 SERPL-SCNC: 27.3 MMOL/L (ref 22–29)
CREAT SERPL-MCNC: 0.75 MG/DL (ref 0.57–1)
GFR SERPLBLD CREATININE-BSD FMLA CKD-EPI: 79 ML/MIN/1.73
GFR SERPLBLD CREATININE-BSD FMLA CKD-EPI: 96 ML/MIN/1.73
GLUCOSE SERPL-MCNC: 87 MG/DL (ref 65–99)
POTASSIUM SERPL-SCNC: 4 MMOL/L (ref 3.5–5.2)
SODIUM SERPL-SCNC: 136 MMOL/L (ref 136–145)

## 2018-04-17 ENCOUNTER — TELEPHONE (OUTPATIENT)
Dept: FAMILY MEDICINE CLINIC | Facility: CLINIC | Age: 58
End: 2018-04-17

## 2018-04-17 DIAGNOSIS — I10 ESSENTIAL HYPERTENSION: ICD-10-CM

## 2018-04-17 RX ORDER — AMLODIPINE BESYLATE 10 MG/1
10 TABLET ORAL DAILY
Qty: 30 TABLET | Refills: 1 | Status: SHIPPED | OUTPATIENT
Start: 2018-04-17 | End: 2018-04-17 | Stop reason: SINTOL

## 2018-04-17 NOTE — TELEPHONE ENCOUNTER
Called and spoke with patient to let her know her lab results and to continue with the HCTZ.    ----- Message from Luana Angel MD sent at 4/17/2018  8:56 AM EDT -----  Regarding: labs  Please call pt and let her know that her recent BMP lab was normal and she should continue the diuretic HCTZ for her BP.     Thanks!    Dr. Angel

## 2018-04-20 ENCOUNTER — APPOINTMENT (OUTPATIENT)
Dept: WOMENS IMAGING | Facility: HOSPITAL | Age: 58
End: 2018-04-20

## 2018-04-20 PROCEDURE — 76641 ULTRASOUND BREAST COMPLETE: CPT | Performed by: RADIOLOGY

## 2018-04-20 PROCEDURE — 77065 DX MAMMO INCL CAD UNI: CPT | Performed by: RADIOLOGY

## 2018-04-20 PROCEDURE — 77061 BREAST TOMOSYNTHESIS UNI: CPT | Performed by: RADIOLOGY

## 2018-04-20 PROCEDURE — G0279 TOMOSYNTHESIS, MAMMO: HCPCS | Performed by: RADIOLOGY

## 2018-04-27 ENCOUNTER — APPOINTMENT (OUTPATIENT)
Dept: WOMENS IMAGING | Facility: HOSPITAL | Age: 58
End: 2018-04-27

## 2018-04-27 PROCEDURE — 19083 BX BREAST 1ST LESION US IMAG: CPT | Performed by: RADIOLOGY

## 2018-05-23 DIAGNOSIS — I10 ESSENTIAL HYPERTENSION: ICD-10-CM

## 2018-05-23 RX ORDER — HYDROCHLOROTHIAZIDE 12.5 MG/1
12.5 CAPSULE, GELATIN COATED ORAL DAILY
Qty: 30 CAPSULE | Refills: 1 | Status: SHIPPED | OUTPATIENT
Start: 2018-05-23 | End: 2018-07-16

## 2018-07-11 DIAGNOSIS — F32.A DEPRESSION, UNSPECIFIED DEPRESSION TYPE: ICD-10-CM

## 2018-07-11 RX ORDER — CITALOPRAM 20 MG/1
20 TABLET ORAL DAILY
Qty: 90 TABLET | Refills: 0 | Status: SHIPPED | OUTPATIENT
Start: 2018-07-11 | End: 2018-07-16 | Stop reason: SDUPTHER

## 2018-07-16 ENCOUNTER — OFFICE VISIT (OUTPATIENT)
Dept: FAMILY MEDICINE CLINIC | Facility: CLINIC | Age: 58
End: 2018-07-16

## 2018-07-16 VITALS
DIASTOLIC BLOOD PRESSURE: 88 MMHG | WEIGHT: 199 LBS | RESPIRATION RATE: 13 BRPM | OXYGEN SATURATION: 98 % | SYSTOLIC BLOOD PRESSURE: 142 MMHG | HEART RATE: 64 BPM | HEIGHT: 66 IN | BODY MASS INDEX: 31.98 KG/M2

## 2018-07-16 DIAGNOSIS — I10 ESSENTIAL HYPERTENSION: ICD-10-CM

## 2018-07-16 DIAGNOSIS — Z00.00 ANNUAL PHYSICAL EXAM: Primary | ICD-10-CM

## 2018-07-16 DIAGNOSIS — F32.A DEPRESSION, UNSPECIFIED DEPRESSION TYPE: ICD-10-CM

## 2018-07-16 PROCEDURE — 99213 OFFICE O/P EST LOW 20 MIN: CPT | Performed by: FAMILY MEDICINE

## 2018-07-16 RX ORDER — CITALOPRAM 20 MG/1
20 TABLET ORAL DAILY
Qty: 90 TABLET | Refills: 3 | Status: SHIPPED | OUTPATIENT
Start: 2018-07-16 | End: 2019-06-17 | Stop reason: SDUPTHER

## 2018-07-16 RX ORDER — HYDROCHLOROTHIAZIDE 25 MG/1
25 TABLET ORAL DAILY
Qty: 30 TABLET | Refills: 2 | Status: SHIPPED | OUTPATIENT
Start: 2018-07-16 | End: 2018-08-30 | Stop reason: SDUPTHER

## 2018-07-16 NOTE — PROGRESS NOTES
Subjective   Rakel THOMAS Velarde is a 58 y.o. female. Patient is here today for   Chief Complaint   Patient presents with   • Hypertension     Follow up   • Annual Exam     Physical         HPI      Health Habits:  Dental Exam. up to date  Eye Exam. not up to date - needs to schedule  Exercise: 0 times/week.  Current exercise activities include: none but she has an elliptical machine at home  HTN: BP running 140s-150s/70s-80s despite taking HCTZ 12.5 mg daily (did not tolerate Amlodipine due to LE edema or Lisinopril due to cough)  Mild HLD 6 months ago, but remainder of fasting labs WNL  Pap smear, DEXA, and mammogram UTD about 3 months ago w/her Gynecologist Dr. Maria Antonia Gray.  Pt reports that her mammogram was abnormal but follow up biopsy benign.    Colonoscopy is UTD through May 2019  Hep A and Tdap are UTD  Mood stable on current medication regimen  Pt feels safe at home  Pt uses seatbelts, sunscreen, and smoke detec tors consistently      The following portions of the patient's history were reviewed and updated as appropriate: allergies, current medications, past family history, past medical history, past social history, past surgical history and problem list.    Past Medical History:   Diagnosis Date   • Angular cheilitis 2016   • Anxiety    • GERD (gastroesophageal reflux disease)    • HSV infection     followed by OB/GYN, Dr. Maria Antonia Gray   • Hyperlipidemia    • Hypertension    • Obesity    • Osteopenia    • Ulcerative colitis (CMS/HCC)     followed by Dr. Linda Garzon, Q2 yr colonoscopies   • Vitamin D deficiency       Past Surgical History:   Procedure Laterality Date   •  SECTION     • COLONOSCOPY  2011    Dr Garzon-redo in 3-5 years   • HYSTEROSCOPY W/ ENDOMETRIAL ABLATION     • MANDIBLE FRACTURE SURGERY     • TONSILLECTOMY     • TUBAL ABDOMINAL LIGATION     • WISDOM TOOTH EXTRACTION       Family History   Problem Relation Age of Onset   • Breast cancer Mother 50   • Emphysema Mother    •  Macular degeneration Mother    • Hypertension Mother    • Hypertension Father    • Dementia Father    • Parkinsonism Father    • Depression Brother    • Esophageal cancer Brother    • Hypertension Brother    • Melanoma Brother    • Lymphoma Maternal Grandmother    • Macular degeneration Maternal Grandmother    • Macular degeneration Maternal Aunt    • Macular degeneration Maternal Uncle        Allergies   Allergen Reactions   • Lisinopril Cough      Social History     Social History   • Marital status:      Social History Main Topics   • Smoking status: Never Smoker   • Smokeless tobacco: Never Used   • Alcohol use 1.8 oz/week     3 Glasses of wine per week      Comment: socially   • Drug use: No       Social History Narrative    Lives at home with her .    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        Outpatient Medications Prior to Visit   Medication Sig Dispense Refill   • balsalazide (COLAZAL) 750 MG capsule Take by mouth 3 (three) times a day.     • Calcium-Magnesium-Vitamin D (CALCIUM 500 PO) Take by mouth.     • Cholecalciferol (VITAMIN D3) 2000 UNITS tablet Take 2 tablets by mouth daily.     • clindamycin (CLEOCIN T) 1 % lotion APPLY THIN LAYER TO AFFECTED AREA(S) TWICE DAILY  3   • esomeprazole (nexIUM) 20 MG capsule Take 20 mg by mouth Every Morning Before Breakfast.     • estradiol (ESTRACE) 0.5 MG tablet      • fluorouracil (EFUDEX) 5 % cream APPLY TOPICALLY TO AFFECTED AREA(S) TWICE DAILY FOR 2 WEEKS  0   • medroxyPROGESTERone (PROVERA) 2.5 MG tablet Take by mouth.     • progesterone (PROMETRIUM) 100 MG capsule      • valACYclovir (VALTREX) 500 MG tablet Take 500 mg by mouth 2 (Two) Times a Day.     • citalopram (CeleXA) 20 MG tablet TAKE 1 TABLET BY MOUTH DAILY. 90 tablet 0   • hydrochlorothiazide (MICROZIDE) 12.5 MG capsule TAKE 1 CAPSULE BY MOUTH DAILY. 30 capsule 1     No facility-administered medications prior to visit.           Review of Systems    HENT: Negative for rhinorrhea and sore throat.    Eyes: Negative for pain and visual disturbance.   Respiratory: Negative for cough, chest tightness, shortness of breath and wheezing.    Cardiovascular: Negative for chest pain, palpitations and leg swelling.   Gastrointestinal: Negative for abdominal pain, constipation, diarrhea, nausea and vomiting.   Genitourinary: Negative for dysuria and hematuria.   Musculoskeletal: Negative for arthralgias and myalgias.   Skin: Negative for pallor and rash.   Neurological: Negative for dizziness and headaches.   Psychiatric/Behavioral: Negative for dysphoric mood and sleep disturbance. The patient is not nervous/anxious.          Objective       Vitals:    07/16/18 1003   BP: 142/88   Pulse: 64   Resp: 13   SpO2: 98%     BMI 32    Physical Exam   Constitutional: She appears well-developed and well-nourished. No distress.   HENT:   Head: Normocephalic and atraumatic.   Mouth/Throat: Oropharynx is clear and moist.   Eyes: Conjunctivae are normal. Pupils are equal, round, and reactive to light.   Neck: No thyromegaly present.   Cardiovascular: Normal rate, regular rhythm and normal heart sounds.  Exam reveals no gallop and no friction rub.    No murmur heard.  Pulmonary/Chest: Effort normal and breath sounds normal. No respiratory distress. She has no wheezes. She has no rhonchi. She has no rales.   Abdominal: Soft. Bowel sounds are normal. She exhibits no distension. There is no tenderness. There is no rebound and no guarding.   Musculoskeletal: She exhibits no edema or deformity.   Lymphadenopathy:     She has no cervical adenopathy.   Skin: Skin is warm and dry.   Psychiatric: She has a normal mood and affect. Her behavior is normal.       ASSESSMENT/PLAN       Problem List Items Addressed This Visit        Cardiovascular and Mediastinum    Hypertension    Relevant Medications    Increase hydrochlorothiazide (HYDRODIURIL) from 12.5 to 25 MG tablet daily      Other Visit  "Diagnoses     Annual physical exam    -  Primary  Gynecology records requested  Metabolic labs UTD  Pt advised to get Shingrix at her pharmacy  Pt counseled on healthy diet and exercise modifications  Colonoscopy UTD      Depression, unspecified depression type        Relevant Medications    Refill citalopram (CeleXA) 20 MG tablet              Patient Instructions   Try to exercise at least 3 times per week for 30 minutes per session.    Don't forget to schedule an eye exam soon.      Consider getting the Shingrix shot at your pharmacy.        DASH Eating Plan  DASH stands for \"Dietary Approaches to Stop Hypertension.\" The DASH eating plan is a healthy eating plan that has been shown to reduce high blood pressure (hypertension). It may also reduce your risk for type 2 diabetes, heart disease, and stroke. The DASH eating plan may also help with weight loss.  What are tips for following this plan?  General guidelines  · Avoid eating more than 2,300 mg (milligrams) of salt (sodium) a day. If you have hypertension, you may need to reduce your sodium intake to 1,500 mg a day.  · Limit alcohol intake to no more than 1 drink a day for nonpregnant women and 2 drinks a day for men. One drink equals 12 oz of beer, 5 oz of wine, or 1½ oz of hard liquor.  · Work with your health care provider to maintain a healthy body weight or to lose weight. Ask what an ideal weight is for you.  · Get at least 30 minutes of exercise that causes your heart to beat faster (aerobic exercise) most days of the week. Activities may include walking, swimming, or biking.  · Work with your health care provider or diet and nutrition specialist (dietitian) to adjust your eating plan to your individual calorie needs.  Reading food labels  · Check food labels for the amount of sodium per serving. Choose foods with less than 5 percent of the Daily Value of sodium. Generally, foods with less than 300 mg of sodium per serving fit into this eating " "plan.  · To find whole grains, look for the word \"whole\" as the first word in the ingredient list.  Shopping  · Buy products labeled as \"low-sodium\" or \"no salt added.\"  · Buy fresh foods. Avoid canned foods and premade or frozen meals.  Cooking  · Avoid adding salt when cooking. Use salt-free seasonings or herbs instead of table salt or sea salt. Check with your health care provider or pharmacist before using salt substitutes.  · Do not ryder foods. Cook foods using healthy methods such as baking, boiling, grilling, and broiling instead.  · Cook with heart-healthy oils, such as olive, canola, soybean, or sunflower oil.  Meal planning    · Eat a balanced diet that includes:  ? 5 or more servings of fruits and vegetables each day. At each meal, try to fill half of your plate with fruits and vegetables.  ? Up to 6-8 servings of whole grains each day.  ? Less than 6 oz of lean meat, poultry, or fish each day. A 3-oz serving of meat is about the same size as a deck of cards. One egg equals 1 oz.  ? 2 servings of low-fat dairy each day.  ? A serving of nuts, seeds, or beans 5 times each week.  ? Heart-healthy fats. Healthy fats called Omega-3 fatty acids are found in foods such as flaxseeds and coldwater fish, like sardines, salmon, and mackerel.  · Limit how much you eat of the following:  ? Canned or prepackaged foods.  ? Food that is high in trans fat, such as fried foods.  ? Food that is high in saturated fat, such as fatty meat.  ? Sweets, desserts, sugary drinks, and other foods with added sugar.  ? Full-fat dairy products.  · Do not salt foods before eating.  · Try to eat at least 2 vegetarian meals each week.  · Eat more home-cooked food and less restaurant, buffet, and fast food.  · When eating at a restaurant, ask that your food be prepared with less salt or no salt, if possible.  What foods are recommended?  The items listed may not be a complete list. Talk with your dietitian about what dietary choices are best " for you.  Grains  Whole-grain or whole-wheat bread. Whole-grain or whole-wheat pasta. Brown rice. Oatmeal. Quinoa. Bulgur. Whole-grain and low-sodium cereals. Janell bread. Low-fat, low-sodium crackers. Whole-wheat flour tortillas.  Vegetables  Fresh or frozen vegetables (raw, steamed, roasted, or grilled). Low-sodium or reduced-sodium tomato and vegetable juice. Low-sodium or reduced-sodium tomato sauce and tomato paste. Low-sodium or reduced-sodium canned vegetables.  Fruits  All fresh, dried, or frozen fruit. Canned fruit in natural juice (without added sugar).  Meat and other protein foods  Skinless chicken or turkey. Ground chicken or turkey. Pork with fat trimmed off. Fish and seafood. Egg whites. Dried beans, peas, or lentils. Unsalted nuts, nut butters, and seeds. Unsalted canned beans. Lean cuts of beef with fat trimmed off. Low-sodium, lean deli meat.  Dairy  Low-fat (1%) or fat-free (skim) milk. Fat-free, low-fat, or reduced-fat cheeses. Nonfat, low-sodium ricotta or cottage cheese. Low-fat or nonfat yogurt. Low-fat, low-sodium cheese.  Fats and oils  Soft margarine without trans fats. Vegetable oil. Low-fat, reduced-fat, or light mayonnaise and salad dressings (reduced-sodium). Canola, safflower, olive, soybean, and sunflower oils. Avocado.  Seasoning and other foods  Herbs. Spices. Seasoning mixes without salt. Unsalted popcorn and pretzels. Fat-free sweets.  What foods are not recommended?  The items listed may not be a complete list. Talk with your dietitian about what dietary choices are best for you.  Grains  Baked goods made with fat, such as croissants, muffins, or some breads. Dry pasta or rice meal packs.  Vegetables  Creamed or fried vegetables. Vegetables in a cheese sauce. Regular canned vegetables (not low-sodium or reduced-sodium). Regular canned tomato sauce and paste (not low-sodium or reduced-sodium). Regular tomato and vegetable juice (not low-sodium or reduced-sodium). Eduar.  Olives.  Fruits  Canned fruit in a light or heavy syrup. Fried fruit. Fruit in cream or butter sauce.  Meat and other protein foods  Fatty cuts of meat. Ribs. Fried meat. Valentino. Sausage. Bologna and other processed lunch meats. Salami. Fatback. Hotdogs. Bratwurst. Salted nuts and seeds. Canned beans with added salt. Canned or smoked fish. Whole eggs or egg yolks. Chicken or turkey with skin.  Dairy  Whole or 2% milk, cream, and half-and-half. Whole or full-fat cream cheese. Whole-fat or sweetened yogurt. Full-fat cheese. Nondairy creamers. Whipped toppings. Processed cheese and cheese spreads.  Fats and oils  Butter. Stick margarine. Lard. Shortening. Ghee. Valentino fat. Tropical oils, such as coconut, palm kernel, or palm oil.  Seasoning and other foods  Salted popcorn and pretzels. Onion salt, garlic salt, seasoned salt, table salt, and sea salt. Worcestershire sauce. Tartar sauce. Barbecue sauce. Teriyaki sauce. Soy sauce, including reduced-sodium. Steak sauce. Canned and packaged gravies. Fish sauce. Oyster sauce. Cocktail sauce. Horseradish that you find on the shelf. Ketchup. Mustard. Meat flavorings and tenderizers. Bouillon cubes. Hot sauce and Tabasco sauce. Premade or packaged marinades. Premade or packaged taco seasonings. Relishes. Regular salad dressings.  Where to find more information:  · National Heart, Lung, and Blood [place]: www.nhlbi.nih.gov  · American Heart Association: www.heart.org  Summary  · The DASH eating plan is a healthy eating plan that has been shown to reduce high blood pressure (hypertension). It may also reduce your risk for type 2 diabetes, heart disease, and stroke.  · With the DASH eating plan, you should limit salt (sodium) intake to 2,300 mg a day. If you have hypertension, you may need to reduce your sodium intake to 1,500 mg a day.  · When on the DASH eating plan, aim to eat more fresh fruits and vegetables, whole grains, lean proteins, low-fat dairy, and heart-healthy  fats.  · Work with your health care provider or diet and nutrition specialist (dietitian) to adjust your eating plan to your individual calorie needs.  This information is not intended to replace advice given to you by your health care provider. Make sure you discuss any questions you have with your health care provider.  Document Released: 12/06/2012 Document Revised: 12/11/2017 Document Reviewed: 12/11/2017  [employer] Interactive Patient Education © 2018 [employer]                Return in about 6 weeks (around 8/27/2018) for Recheck JAYJAY ESTRADA

## 2018-07-16 NOTE — PATIENT INSTRUCTIONS
"Try to exercise at least 3 times per week for 30 minutes per session.    Don't forget to schedule an eye exam soon.      Consider getting the Shingrix shot at your pharmacy.        DASH Eating Plan  DASH stands for \"Dietary Approaches to Stop Hypertension.\" The DASH eating plan is a healthy eating plan that has been shown to reduce high blood pressure (hypertension). It may also reduce your risk for type 2 diabetes, heart disease, and stroke. The DASH eating plan may also help with weight loss.  What are tips for following this plan?  General guidelines  · Avoid eating more than 2,300 mg (milligrams) of salt (sodium) a day. If you have hypertension, you may need to reduce your sodium intake to 1,500 mg a day.  · Limit alcohol intake to no more than 1 drink a day for nonpregnant women and 2 drinks a day for men. One drink equals 12 oz of beer, 5 oz of wine, or 1½ oz of hard liquor.  · Work with your health care provider to maintain a healthy body weight or to lose weight. Ask what an ideal weight is for you.  · Get at least 30 minutes of exercise that causes your heart to beat faster (aerobic exercise) most days of the week. Activities may include walking, swimming, or biking.  · Work with your health care provider or diet and nutrition specialist (dietitian) to adjust your eating plan to your individual calorie needs.  Reading food labels  · Check food labels for the amount of sodium per serving. Choose foods with less than 5 percent of the Daily Value of sodium. Generally, foods with less than 300 mg of sodium per serving fit into this eating plan.  · To find whole grains, look for the word \"whole\" as the first word in the ingredient list.  Shopping  · Buy products labeled as \"low-sodium\" or \"no salt added.\"  · Buy fresh foods. Avoid canned foods and premade or frozen meals.  Cooking  · Avoid adding salt when cooking. Use salt-free seasonings or herbs instead of table salt or sea salt. Check with your health care " provider or pharmacist before using salt substitutes.  · Do not ryder foods. Cook foods using healthy methods such as baking, boiling, grilling, and broiling instead.  · Cook with heart-healthy oils, such as olive, canola, soybean, or sunflower oil.  Meal planning    · Eat a balanced diet that includes:  ? 5 or more servings of fruits and vegetables each day. At each meal, try to fill half of your plate with fruits and vegetables.  ? Up to 6-8 servings of whole grains each day.  ? Less than 6 oz of lean meat, poultry, or fish each day. A 3-oz serving of meat is about the same size as a deck of cards. One egg equals 1 oz.  ? 2 servings of low-fat dairy each day.  ? A serving of nuts, seeds, or beans 5 times each week.  ? Heart-healthy fats. Healthy fats called Omega-3 fatty acids are found in foods such as flaxseeds and coldwater fish, like sardines, salmon, and mackerel.  · Limit how much you eat of the following:  ? Canned or prepackaged foods.  ? Food that is high in trans fat, such as fried foods.  ? Food that is high in saturated fat, such as fatty meat.  ? Sweets, desserts, sugary drinks, and other foods with added sugar.  ? Full-fat dairy products.  · Do not salt foods before eating.  · Try to eat at least 2 vegetarian meals each week.  · Eat more home-cooked food and less restaurant, buffet, and fast food.  · When eating at a restaurant, ask that your food be prepared with less salt or no salt, if possible.  What foods are recommended?  The items listed may not be a complete list. Talk with your dietitian about what dietary choices are best for you.  Grains  Whole-grain or whole-wheat bread. Whole-grain or whole-wheat pasta. Brown rice. Oatmeal. Quinoa. Bulgur. Whole-grain and low-sodium cereals. Janell bread. Low-fat, low-sodium crackers. Whole-wheat flour tortillas.  Vegetables  Fresh or frozen vegetables (raw, steamed, roasted, or grilled). Low-sodium or reduced-sodium tomato and vegetable juice. Low-sodium or  reduced-sodium tomato sauce and tomato paste. Low-sodium or reduced-sodium canned vegetables.  Fruits  All fresh, dried, or frozen fruit. Canned fruit in natural juice (without added sugar).  Meat and other protein foods  Skinless chicken or turkey. Ground chicken or turkey. Pork with fat trimmed off. Fish and seafood. Egg whites. Dried beans, peas, or lentils. Unsalted nuts, nut butters, and seeds. Unsalted canned beans. Lean cuts of beef with fat trimmed off. Low-sodium, lean deli meat.  Dairy  Low-fat (1%) or fat-free (skim) milk. Fat-free, low-fat, or reduced-fat cheeses. Nonfat, low-sodium ricotta or cottage cheese. Low-fat or nonfat yogurt. Low-fat, low-sodium cheese.  Fats and oils  Soft margarine without trans fats. Vegetable oil. Low-fat, reduced-fat, or light mayonnaise and salad dressings (reduced-sodium). Canola, safflower, olive, soybean, and sunflower oils. Avocado.  Seasoning and other foods  Herbs. Spices. Seasoning mixes without salt. Unsalted popcorn and pretzels. Fat-free sweets.  What foods are not recommended?  The items listed may not be a complete list. Talk with your dietitian about what dietary choices are best for you.  Grains  Baked goods made with fat, such as croissants, muffins, or some breads. Dry pasta or rice meal packs.  Vegetables  Creamed or fried vegetables. Vegetables in a cheese sauce. Regular canned vegetables (not low-sodium or reduced-sodium). Regular canned tomato sauce and paste (not low-sodium or reduced-sodium). Regular tomato and vegetable juice (not low-sodium or reduced-sodium). Pickles. Olives.  Fruits  Canned fruit in a light or heavy syrup. Fried fruit. Fruit in cream or butter sauce.  Meat and other protein foods  Fatty cuts of meat. Ribs. Fried meat. Valentino. Sausage. Bologna and other processed lunch meats. Salami. Fatback. Hotdogs. Bratwurst. Salted nuts and seeds. Canned beans with added salt. Canned or smoked fish. Whole eggs or egg yolks. Chicken or turkey  with skin.  Dairy  Whole or 2% milk, cream, and half-and-half. Whole or full-fat cream cheese. Whole-fat or sweetened yogurt. Full-fat cheese. Nondairy creamers. Whipped toppings. Processed cheese and cheese spreads.  Fats and oils  Butter. Stick margarine. Lard. Shortening. Ghee. Valentino fat. Tropical oils, such as coconut, palm kernel, or palm oil.  Seasoning and other foods  Salted popcorn and pretzels. Onion salt, garlic salt, seasoned salt, table salt, and sea salt. Worcestershire sauce. Tartar sauce. Barbecue sauce. Teriyaki sauce. Soy sauce, including reduced-sodium. Steak sauce. Canned and packaged gravies. Fish sauce. Oyster sauce. Cocktail sauce. Horseradish that you find on the shelf. Ketchup. Mustard. Meat flavorings and tenderizers. Bouillon cubes. Hot sauce and Tabasco sauce. Premade or packaged marinades. Premade or packaged taco seasonings. Relishes. Regular salad dressings.  Where to find more information:  · National Heart, Lung, and Blood [place]: www.nhlbi.nih.gov  · American Heart Association: www.heart.org  Summary  · The DASH eating plan is a healthy eating plan that has been shown to reduce high blood pressure (hypertension). It may also reduce your risk for type 2 diabetes, heart disease, and stroke.  · With the DASH eating plan, you should limit salt (sodium) intake to 2,300 mg a day. If you have hypertension, you may need to reduce your sodium intake to 1,500 mg a day.  · When on the DASH eating plan, aim to eat more fresh fruits and vegetables, whole grains, lean proteins, low-fat dairy, and heart-healthy fats.  · Work with your health care provider or diet and nutrition specialist (dietitian) to adjust your eating plan to your individual calorie needs.  This information is not intended to replace advice given to you by your health care provider. Make sure you discuss any questions you have with your health care provider.  Document Released: 12/06/2012 Document Revised: 12/11/2017  Document Reviewed: 12/11/2017  [employer] Interactive Patient Education © 2018 [employer]

## 2018-07-20 DIAGNOSIS — I10 ESSENTIAL HYPERTENSION: ICD-10-CM

## 2018-07-20 RX ORDER — HYDROCHLOROTHIAZIDE 12.5 MG/1
12.5 CAPSULE, GELATIN COATED ORAL DAILY
Qty: 30 CAPSULE | Refills: 1 | Status: SHIPPED | OUTPATIENT
Start: 2018-07-20 | End: 2018-08-30

## 2018-08-30 ENCOUNTER — OFFICE VISIT (OUTPATIENT)
Dept: FAMILY MEDICINE CLINIC | Facility: CLINIC | Age: 58
End: 2018-08-30

## 2018-08-30 VITALS
BODY MASS INDEX: 31.66 KG/M2 | OXYGEN SATURATION: 98 % | HEIGHT: 66 IN | WEIGHT: 197 LBS | SYSTOLIC BLOOD PRESSURE: 130 MMHG | HEART RATE: 69 BPM | RESPIRATION RATE: 13 BRPM | DIASTOLIC BLOOD PRESSURE: 88 MMHG

## 2018-08-30 DIAGNOSIS — I10 ESSENTIAL HYPERTENSION: Primary | ICD-10-CM

## 2018-08-30 DIAGNOSIS — M21.619 BUNION: ICD-10-CM

## 2018-08-30 DIAGNOSIS — K51.90 ULCERATIVE COLITIS WITHOUT COMPLICATIONS, UNSPECIFIED LOCATION (HCC): ICD-10-CM

## 2018-08-30 LAB
BUN SERPL-MCNC: 18 MG/DL (ref 6–20)
BUN/CREAT SERPL: 20.5 (ref 7–25)
CALCIUM SERPL-MCNC: 9.8 MG/DL (ref 8.6–10.5)
CHLORIDE SERPL-SCNC: 96 MMOL/L (ref 98–107)
CO2 SERPL-SCNC: 28.7 MMOL/L (ref 22–29)
CREAT SERPL-MCNC: 0.88 MG/DL (ref 0.57–1)
GLUCOSE SERPL-MCNC: 88 MG/DL (ref 65–99)
POTASSIUM SERPL-SCNC: 4.2 MMOL/L (ref 3.5–5.2)
SODIUM SERPL-SCNC: 136 MMOL/L (ref 136–145)

## 2018-08-30 PROCEDURE — 99213 OFFICE O/P EST LOW 20 MIN: CPT | Performed by: FAMILY MEDICINE

## 2018-08-30 RX ORDER — HYDROCHLOROTHIAZIDE 50 MG/1
50 TABLET ORAL DAILY
Qty: 30 TABLET | Refills: 3 | Status: SHIPPED | OUTPATIENT
Start: 2018-08-30 | End: 2018-12-06 | Stop reason: SDUPTHER

## 2018-09-04 ENCOUNTER — TELEPHONE (OUTPATIENT)
Dept: FAMILY MEDICINE CLINIC | Facility: CLINIC | Age: 58
End: 2018-09-04

## 2018-09-04 NOTE — TELEPHONE ENCOUNTER
Called and spoke with patient to let her know her lab results and ask her to make an appointment for follow up labs.     ----- Message from Luana Angel MD sent at 9/3/2018 11:56 AM EDT -----  Regarding: follow up labs  Please ask pt to schedule a non-fasting lab appt in about 2 weeks (since we recently adjusted her diuretic we will need to keep an eye on her electrolyte levels).    Thanks!    Dr. Angel

## 2018-09-17 DIAGNOSIS — I10 BENIGN HYPERTENSION: Primary | ICD-10-CM

## 2018-09-19 LAB
BUN SERPL-MCNC: 14 MG/DL (ref 6–20)
BUN/CREAT SERPL: 18.9 (ref 7–25)
CALCIUM SERPL-MCNC: 9.8 MG/DL (ref 8.6–10.5)
CHLORIDE SERPL-SCNC: 97 MMOL/L (ref 98–107)
CO2 SERPL-SCNC: 29.4 MMOL/L (ref 22–29)
CREAT SERPL-MCNC: 0.74 MG/DL (ref 0.57–1)
GLUCOSE SERPL-MCNC: 96 MG/DL (ref 65–99)
POTASSIUM SERPL-SCNC: 4.3 MMOL/L (ref 3.5–5.2)
SODIUM SERPL-SCNC: 140 MMOL/L (ref 136–145)

## 2018-09-20 ENCOUNTER — TELEPHONE (OUTPATIENT)
Dept: FAMILY MEDICINE CLINIC | Facility: CLINIC | Age: 58
End: 2018-09-20

## 2018-09-20 NOTE — TELEPHONE ENCOUNTER
Left VM to let patient know her labs are good.     ----- Message from Luana Angel MD sent at 9/20/2018  8:14 AM EDT -----  Please contact Rakel Velarde and let pt know all labs looked good.

## 2018-10-01 ENCOUNTER — OFFICE VISIT (OUTPATIENT)
Dept: ORTHOPEDIC SURGERY | Facility: CLINIC | Age: 58
End: 2018-10-01

## 2018-10-01 VITALS — TEMPERATURE: 98.2 F | BODY MASS INDEX: 32.82 KG/M2 | WEIGHT: 197 LBS | HEIGHT: 65 IN

## 2018-10-01 DIAGNOSIS — M77.41 METATARSALGIA OF RIGHT FOOT: ICD-10-CM

## 2018-10-01 DIAGNOSIS — M20.11 HALLUX VALGUS OF RIGHT FOOT: ICD-10-CM

## 2018-10-01 DIAGNOSIS — M79.674 TOE PAIN, RIGHT: Primary | ICD-10-CM

## 2018-10-01 PROCEDURE — 99244 OFF/OP CNSLTJ NEW/EST MOD 40: CPT | Performed by: ORTHOPAEDIC SURGERY

## 2018-10-01 PROCEDURE — 73630 X-RAY EXAM OF FOOT: CPT | Performed by: ORTHOPAEDIC SURGERY

## 2018-10-01 NOTE — PROGRESS NOTES
New Patient Complaint      Patient: Rakel Velarde  YOB: 1960 58 y.o. female  Medical Record Number: [id number]    Chief Complaints: my foot hurts    History of Present Illness: Patient reports a 1 year history of mild intermittent aching pain under the plantar aspect of the second and third toe worse with prolonged walking and standing and improved anti-inflammatories and rest.  She has no significant complaints on the first MTP joint.  No injury of which she is aware.    She is seen today at the request of Luana Angel M.D. who has requested my opinion regarding etiology and treatment of this condition         HPI    Allergies:   Allergies   Allergen Reactions   • Lisinopril Cough       Medications:   Current Outpatient Prescriptions on File Prior to Visit   Medication Sig   • balsalazide (COLAZAL) 750 MG capsule Take by mouth 3 (three) times a day.   • Calcium-Magnesium-Vitamin D (CALCIUM 500 PO) Take by mouth.   • Cholecalciferol (VITAMIN D3) 2000 UNITS tablet Take 2 tablets by mouth daily.   • citalopram (CeleXA) 20 MG tablet Take 1 tablet by mouth Daily.   • clindamycin (CLEOCIN T) 1 % lotion APPLY THIN LAYER TO AFFECTED AREA(S) TWICE DAILY   • esomeprazole (nexIUM) 20 MG capsule Take 20 mg by mouth Every Morning Before Breakfast.   • estradiol (ESTRACE) 0.5 MG tablet    • fluorouracil (EFUDEX) 5 % cream APPLY TOPICALLY TO AFFECTED AREA(S) TWICE DAILY FOR 2 WEEKS   • hydrochlorothiazide (HYDRODIURIL) 50 MG tablet Take 1 tablet by mouth Daily.   • progesterone (PROMETRIUM) 100 MG capsule    • valACYclovir (VALTREX) 500 MG tablet Take 500 mg by mouth 2 (Two) Times a Day.   • medroxyPROGESTERone (PROVERA) 2.5 MG tablet Take by mouth.     No current facility-administered medications on file prior to visit.        Past Medical History:   Diagnosis Date   • Angular cheilitis 1/17/2016   • Anxiety    • GERD (gastroesophageal reflux disease)    • HSV infection     followed by OB/GYN, Dr. Em  "Isaac   • Hyperlipidemia    • Hypertension    • Obesity    • Osteopenia    • Ulcerative colitis (CMS/HCC)     followed by Dr. Linda Garzon, Q2 yr colonoscopies   • Vitamin D deficiency      Past Surgical History:   Procedure Laterality Date   •  SECTION     • COLONOSCOPY  2011    Dr Garzon-redo in 3-5 years   • HYSTEROSCOPY W/ ENDOMETRIAL ABLATION     • MANDIBLE FRACTURE SURGERY     • TONSILLECTOMY     • TUBAL ABDOMINAL LIGATION     • WISDOM TOOTH EXTRACTION       Social History     Occupational History   • Not on file.     Social History Main Topics   • Smoking status: Never Smoker   • Smokeless tobacco: Never Used   • Alcohol use 1.8 oz/week     3 Glasses of wine per week      Comment: socially   • Drug use: No   • Sexual activity: Not Currently      Social History     Social History Narrative    Lives at home with her .  Marriage is strained.  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       Family History   Problem Relation Age of Onset   • Breast cancer Mother 50   • Emphysema Mother    • Macular degeneration Mother    • Hypertension Mother    • Hypertension Father    • Dementia Father    • Parkinsonism Father    • Depression Brother    • Esophageal cancer Brother    • Hypertension Brother    • Melanoma Brother    • Lymphoma Maternal Grandmother    • Macular degeneration Maternal Grandmother    • Macular degeneration Maternal Aunt    • Macular degeneration Maternal Uncle        Review of Systems: 14 point review of systems performed, positive pertinent findings identified in HPI. All remaining systems negative septic eyes, loss of hearing, ringing in the ears    Review of Systems      Physical Exam:   Vitals:    10/01/18 0953   Temp: 98.2 °F (36.8 °C)   Weight: 89.4 kg (197 lb)   Height: 165.1 cm (65\")     Physical Exam   Constitutional: pleasant, well developed   Eyes: sclera non icteric  Hearing : adequate for exam  Cardiovascular: palpable pulses in right foot, " right calf/ thigh NT without sign of DVT  Respiratoy: breathing unlabored   Neurological: grossly sensate to LT throughout right LE  Psychiatric: oriented with normal mood and affect.   Lymphatic: No palpable popliteal lymphadenopathy right LE  Skin: intact throughout right leg/foot  Musculoskeletal: Right foot shows moderate hallux valgus deformity that is passively correctable to neutral and no significant pain with range of motion or to palpation.  There is slight discomfort beneath the second and third metatarsal heads but no ulceration or callus.  No irritability or instability to the MTP joints these toes nor hammering at the PIP joints.  No focal discomfort over the dorsum of the second and third metatarsals distally.  Neutral dorsiflexion a heel  Physical Exam  Ortho Exam    Radiology:  3views the right foot ordered to evaluate pain reviewed and no prior x-rays available for comparison there is relative elongation of the second and third metatarsals as well as hallux valgus deformity with a somewhat shortened first ray.  ROSALIA of 17 with HVA 32.5    Assessment/Plan: 1.  Right hallux valgus-asymptomatic  2.  Right second and third metatarsalgia    Her metatarsalgia is most likely secondary to overload from her shortened first ray and hallux valgus deformity and tight heel cord.    She does not desire any surgical treatment for the bunion or metatarsal pain nor do I feel it is warranted at this time.    We discussed treatment options and demonstrated heel cord stretching exercises for her to do 4 times a day.  Instruction she was provided and demonstrated for her and discussed etiology of heel cord tightness to forefoot overload.    Also demonstrated intrinsic stretching and strengthening exercises for her to do and she will get orthotics with metatarsal pad.    I will see her back in 2 months x-rays of her right foot.

## 2018-10-02 ENCOUNTER — OFFICE VISIT (OUTPATIENT)
Dept: GASTROENTEROLOGY | Facility: CLINIC | Age: 58
End: 2018-10-02

## 2018-10-02 VITALS
DIASTOLIC BLOOD PRESSURE: 86 MMHG | TEMPERATURE: 97.8 F | SYSTOLIC BLOOD PRESSURE: 122 MMHG | BODY MASS INDEX: 32.96 KG/M2 | WEIGHT: 197.8 LBS | HEIGHT: 65 IN

## 2018-10-02 DIAGNOSIS — K51.311 ULCERATIVE RECTOSIGMOIDITIS WITH RECTAL BLEEDING (HCC): Primary | ICD-10-CM

## 2018-10-02 DIAGNOSIS — K21.9 GASTRO-ESOPHAGEAL REFLUX DISEASE WITHOUT ESOPHAGITIS: ICD-10-CM

## 2018-10-02 PROCEDURE — 99204 OFFICE O/P NEW MOD 45 MIN: CPT | Performed by: INTERNAL MEDICINE

## 2018-10-02 NOTE — PATIENT INSTRUCTIONS
Continue the balsalazide    Continue nexium, ok for zantac, gaviscon as needed for any breakthrough symptoms    Colonoscopy in the spring of next year (by May)    For any additional questions, concerns or changes to your condition after today's office visit please contact the office at [phone].

## 2018-10-02 NOTE — PROGRESS NOTES
Chief Complaint   Patient presents with   • Ulcerative Colitis       Subjective     HPI    Rakel THOMAS Velarde is a 58 y.o. female with a past medical history noted below who presents for evaluation of ulcerative colitis. This was diagnosed about 18 years ago. Her presenting symptoms were abdominal pain and pressure along with rectal bleeding.  She was initially put on asacol with relief.  She has had a few relapses but now she has been on balsalazide -- 3 capsules per day-- for the past 2 years.  This has controlled her well.  She denies an associated excessive stools nor bloody stools.  Rare episodes of more frequent stools.  She has not  needed any steroids in a number of years.      Last colonoscopy was May 2017 with Dr Garzon with one polyp removed and random biopsies taken.    No family history of IBD, GI malignancies, or colon cancer.      She does have issues with GERD.  Mostly symptoms of nocturnal reflux and regurgitation.  She has these symptoms intermittently.  Takes daily nexium and sometimes zantac for breakthrough symptoms.      Past Medical History:   Diagnosis Date   • Angular cheilitis 1/17/2016   • Anxiety    • GERD (gastroesophageal reflux disease)    • HSV infection     followed by OB/GYN, Dr. Maria Antonia Gray   • Hyperlipidemia    • Hypertension    • Obesity    • Osteopenia    • Ulcerative colitis (CMS/[facility])     followed by Dr. Linda Garzon, Q2 yr colonoscopies   • Vitamin D deficiency          Current Outpatient Prescriptions:   •  balsalazide (COLAZAL) 750 MG capsule, Take 750 mg by mouth Daily., Disp: , Rfl:   •  Calcium-Magnesium-Vitamin D (CALCIUM 500 PO), Take by mouth., Disp: , Rfl:   •  Cholecalciferol (VITAMIN D3) 2000 UNITS tablet, Take 2 tablets by mouth daily., Disp: , Rfl:   •  citalopram (CeleXA) 20 MG tablet, Take 1 tablet by mouth Daily., Disp: 90 tablet, Rfl: 3  •  esomeprazole (nexIUM) 20 MG capsule, Take 20 mg by mouth Every Morning Before Breakfast., Disp: , Rfl:   •  estradiol  (ESTRACE) 0.5 MG tablet, , Disp: , Rfl:   •  hydrochlorothiazide (HYDRODIURIL) 50 MG tablet, Take 1 tablet by mouth Daily., Disp: 30 tablet, Rfl: 3  •  progesterone (PROMETRIUM) 100 MG capsule, , Disp: , Rfl:   •  valACYclovir (VALTREX) 500 MG tablet, Take 500 mg by mouth 2 (Two) Times a Day., Disp: , Rfl:   •  clindamycin (CLEOCIN T) 1 % lotion, APPLY THIN LAYER TO AFFECTED AREA(S) TWICE DAILY, Disp: , Rfl: 3  •  fluorouracil (EFUDEX) 5 % cream, APPLY TOPICALLY TO AFFECTED AREA(S) TWICE DAILY FOR 2 WEEKS, Disp: , Rfl: 0    Allergies   Allergen Reactions   • Lisinopril Cough       Social History     Social History   • Marital status:      Spouse name: N/A   • Number of children: N/A   • Years of education: N/A     Occupational History   • Not on file.     Social History Main Topics   • Smoking status: Never Smoker   • Smokeless tobacco: Never Used   • Alcohol use 1.8 oz/week     3 Glasses of wine per week      Comment: socially   • Drug use: No   • Sexual activity: Not Currently     Other Topics Concern   • Not on file     Social History Narrative    Lives at home with her .  Marriage is strained.  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         Family History   Problem Relation Age of Onset   • Breast cancer Mother 50   • Emphysema Mother    • Macular degeneration Mother    • Hypertension Mother    • Pancreatitis Mother    • Hypertension Father    • Dementia Father    • Parkinsonism Father    • Depression Brother    • Esophageal cancer Brother    • Hypertension Brother    • Melanoma Brother    • Lymphoma Maternal Grandmother    • Macular degeneration Maternal Grandmother    • Macular degeneration Maternal Aunt    • Macular degeneration Maternal Uncle        Review of Systems   Constitutional: Negative for activity change, appetite change and fatigue.   HENT: Negative for sore throat and trouble swallowing.    Respiratory: Negative.    Cardiovascular: Negative.     Gastrointestinal: Negative for abdominal distention, abdominal pain and blood in stool.        GERD   Endocrine: Negative for cold intolerance and heat intolerance.   Genitourinary: Negative for difficulty urinating, dysuria and frequency.   Musculoskeletal: Negative for arthralgias, back pain and myalgias.   Skin: Negative.    Hematological: Negative for adenopathy. Does not bruise/bleed easily.   All other systems reviewed and are negative.      Objective     Vitals:    10/02/18 1451   BP: 122/86   Temp: 97.8 °F (36.6 °C)     1    10/02/18  1451   Weight: 89.7 kg (197 lb 12.8 oz)     Body mass index is 32.92 kg/m².    Physical Exam   Constitutional: She is oriented to person, place, and time. She appears well-developed and well-nourished. No distress.   HENT:   Head: Normocephalic and atraumatic.   Right Ear: External ear normal.   Left Ear: External ear normal.   Nose: Nose normal.   Mouth/Throat: Oropharynx is clear and moist.   Eyes: Conjunctivae and EOM are normal. Right eye exhibits no discharge. Left eye exhibits no discharge. No scleral icterus.   Neck: Normal range of motion. Neck supple. No thyromegaly present.   No supraclavicular adenopathy   Cardiovascular: Normal rate, regular rhythm, normal heart sounds and intact distal pulses.  Exam reveals no gallop.    No murmur heard.  No lower extremity edema   Pulmonary/Chest: Effort normal and breath sounds normal. No respiratory distress. She has no wheezes.   Abdominal: Soft. Normal appearance and bowel sounds are normal. She exhibits no distension and no mass. There is no hepatosplenomegaly. There is no tenderness. There is no rigidity, no rebound and no guarding. No hernia.   Genitourinary:   Genitourinary Comments: Rectal exam deferred   Musculoskeletal: Normal range of motion. She exhibits no edema or tenderness.   No atrophy of upper or lower extremities.  Normal digits and nails of both hands.   Lymphadenopathy:     She has no cervical adenopathy.    Neurological: She is alert and oriented to person, place, and time. She displays no atrophy. Coordination normal.   Skin: Skin is warm and dry. No rash noted. She is not diaphoretic. No erythema.   Psychiatric: She has a normal mood and affect. Her behavior is normal. Judgment and thought content normal.   Vitals reviewed.      WBC   Date Value Ref Range Status   01/15/2018 6.76 4.50 - 10.70 10*3/mm3 Final     RBC   Date Value Ref Range Status   01/15/2018 4.26 3.90 - 5.20 10*6/mm3 Final     Hemoglobin   Date Value Ref Range Status   01/15/2018 13.4 11.9 - 15.5 g/dL Final     Hematocrit   Date Value Ref Range Status   01/15/2018 40.5 35.6 - 45.5 % Final     MCV   Date Value Ref Range Status   01/15/2018 95.1 80.5 - 98.2 fL Final     MCH   Date Value Ref Range Status   01/15/2018 31.5 26.9 - 32.0 pg Final     MCHC   Date Value Ref Range Status   01/15/2018 33.1 32.4 - 36.3 g/dL Final     RDW   Date Value Ref Range Status   01/15/2018 13.3 (H) 11.7 - 13.0 % Final     Platelets   Date Value Ref Range Status   01/15/2018 273 140 - 500 10*3/mm3 Final     Neutrophil Rel %   Date Value Ref Range Status   01/15/2018 51.9 42.7 - 76.0 % Final     Lymphocyte Rel %   Date Value Ref Range Status   01/15/2018 39.5 19.6 - 45.3 % Final     Monocyte Rel %   Date Value Ref Range Status   01/15/2018 6.4 5.0 - 12.0 % Final     Eosinophil Rel %   Date Value Ref Range Status   01/15/2018 1.5 0.3 - 6.2 % Final     Basophil Rel %   Date Value Ref Range Status   01/15/2018 0.4 0.0 - 1.5 % Final     Neutrophils Absolute   Date Value Ref Range Status   01/15/2018 3.51 1.90 - 8.10 10*3/mm3 Final     Lymphocytes Absolute   Date Value Ref Range Status   01/15/2018 2.67 0.90 - 4.80 10*3/mm3 Final     Monocytes Absolute   Date Value Ref Range Status   01/15/2018 0.43 0.20 - 1.20 10*3/mm3 Final     Eosinophils Absolute   Date Value Ref Range Status   01/15/2018 0.10 0.00 - 0.70 10*3/mm3 Final     Basophils Absolute   Date Value Ref Range Status    01/15/2018 0.03 0.00 - 0.20 10*3/mm3 Final       Sodium   Date Value Ref Range Status   09/19/2018 140 136 - 145 mmol/L Final     Potassium   Date Value Ref Range Status   09/19/2018 4.3 3.5 - 5.2 mmol/L Final     Total CO2   Date Value Ref Range Status   09/19/2018 29.4 (H) 22.0 - 29.0 mmol/L Final     Chloride   Date Value Ref Range Status   09/19/2018 97 (L) 98 - 107 mmol/L Final     Creatinine   Date Value Ref Range Status   09/19/2018 0.74 0.57 - 1.00 mg/dL Final     BUN   Date Value Ref Range Status   09/19/2018 14 6 - 20 mg/dL Final     BUN/Creatinine Ratio   Date Value Ref Range Status   09/19/2018 18.9 7.0 - 25.0 Final     Calcium   Date Value Ref Range Status   09/19/2018 9.8 8.6 - 10.5 mg/dL Final     eGFR Non  Am   Date Value Ref Range Status   09/19/2018 80 >60 mL/min/1.73 Final     Alkaline Phosphatase   Date Value Ref Range Status   01/15/2018 77 39 - 117 U/L Final     ALT (SGPT)   Date Value Ref Range Status   01/15/2018 11 1 - 33 U/L Final     AST (SGOT)   Date Value Ref Range Status   01/15/2018 18 1 - 32 U/L Final     Total Bilirubin   Date Value Ref Range Status   01/15/2018 0.3 0.1 - 1.2 mg/dL Final     Albumin   Date Value Ref Range Status   01/15/2018 4.50 3.50 - 5.20 g/dL Final     A/G Ratio   Date Value Ref Range Status   01/15/2018 1.4 g/dL Final         Imaging Results (last 7 days)     ** No results found for the last 168 hours. **            No notes on file    Assessment/Plan    Ulcerative colitis: stable on balsalazide, last colonoscopy May 2017    GERD: rare breakthrough episodes, stable on ppi    Plan  Continue the balsalazide  Repeat colonoscopy next year for surveillance  Continue nexium, ok for zantac nightly or gaviscon for any breakthrough symptoms      Rakel was seen today for ulcerative colitis.    Diagnoses and all orders for this visit:    Ulcerative rectosigmoiditis with rectal bleeding (CMS/HCC)    Gastro-esophageal reflux disease without esophagitis        I  have discussed the above plan with the patient.  They verbalize understanding and are in agreement with the plan.  They have been advised to contact the office for any questions, concerns, or changes related to their health.    Dictated utilizing Dragon dictation

## 2018-12-06 ENCOUNTER — OFFICE VISIT (OUTPATIENT)
Dept: FAMILY MEDICINE CLINIC | Facility: CLINIC | Age: 58
End: 2018-12-06

## 2018-12-06 VITALS
BODY MASS INDEX: 32.92 KG/M2 | RESPIRATION RATE: 13 BRPM | SYSTOLIC BLOOD PRESSURE: 122 MMHG | HEIGHT: 65 IN | OXYGEN SATURATION: 98 % | DIASTOLIC BLOOD PRESSURE: 82 MMHG | HEART RATE: 73 BPM

## 2018-12-06 DIAGNOSIS — Z23 NEED FOR VACCINATION: ICD-10-CM

## 2018-12-06 DIAGNOSIS — I10 ESSENTIAL HYPERTENSION: Primary | ICD-10-CM

## 2018-12-06 LAB
BUN SERPL-MCNC: 10 MG/DL (ref 6–20)
BUN/CREAT SERPL: 12 (ref 7–25)
CALCIUM SERPL-MCNC: 10 MG/DL (ref 8.6–10.5)
CHLORIDE SERPL-SCNC: 94 MMOL/L (ref 98–107)
CO2 SERPL-SCNC: 29.1 MMOL/L (ref 22–29)
CREAT SERPL-MCNC: 0.83 MG/DL (ref 0.57–1)
GLUCOSE SERPL-MCNC: 81 MG/DL (ref 65–99)
POTASSIUM SERPL-SCNC: 4.1 MMOL/L (ref 3.5–5.2)
SODIUM SERPL-SCNC: 137 MMOL/L (ref 136–145)

## 2018-12-06 PROCEDURE — 90750 HZV VACC RECOMBINANT IM: CPT | Performed by: FAMILY MEDICINE

## 2018-12-06 PROCEDURE — 99213 OFFICE O/P EST LOW 20 MIN: CPT | Performed by: FAMILY MEDICINE

## 2018-12-06 PROCEDURE — 90471 IMMUNIZATION ADMIN: CPT | Performed by: FAMILY MEDICINE

## 2018-12-06 RX ORDER — HYDROCHLOROTHIAZIDE 50 MG/1
50 TABLET ORAL DAILY
Qty: 30 TABLET | Refills: 11 | Status: SHIPPED | OUTPATIENT
Start: 2018-12-06 | End: 2019-06-17 | Stop reason: SDUPTHER

## 2018-12-06 NOTE — PROGRESS NOTES
Carlos Velarde is a 58 y.o. female.     Chief Complaint   Patient presents with   • Hypertension     Follow Up       HPI     HTN:  -she is taking HCTZ 50 mg per day and tolerating it well  -BP has been well controlled on this regimen when she checks it a home  -occasional dizziness when she first lays down at night but this is not bothersome and she denies any dizziness with standing up   -no chest pain, muscle cramps, headaches, or acute vision changes    She would like to get a Shingrix vaccine today  Flu shot is UTD       Review of Systems   Constitutional: Negative for fatigue and fever.   HENT: Negative for congestion and sore throat.    Eyes: Negative for pain and visual disturbance.   Respiratory: Negative for cough and shortness of breath.    Cardiovascular: Negative for chest pain and palpitations.   Gastrointestinal: Negative for abdominal pain and nausea.   Neurological: Negative for weakness and headaches.       The following portions of the patient's history were reviewed and updated as appropriate: allergies, current medications, past family history, past medical history, past social history, past surgical history and problem list.    Past Medical History:   Diagnosis Date   • Angular cheilitis 2016   • Anxiety    • GERD (gastroesophageal reflux disease)    • Hallux valgus     followed by Ortho, Dr. Humphreys   • HSV infection     followed by OB/GYN, Dr. Maria Antonia Gray   • Hyperlipidemia    • Hypertension    • Obesity    • Osteopenia    • Ulcerative colitis (CMS/HCC)     followed by Dr. Carballo, Q2 yr colonoscopies   • Vitamin D deficiency      Past Surgical History:   Procedure Laterality Date   •  SECTION     • COLONOSCOPY  2011    Dr Dennison in 3-5 years   • HYSTEROSCOPY W/ ENDOMETRIAL ABLATION     • MANDIBLE FRACTURE SURGERY     • TONSILLECTOMY     • TUBAL ABDOMINAL LIGATION     • WISDOM TOOTH EXTRACTION         Social History     Tobacco Use   • Smoking status: Never  Smoker   • Smokeless tobacco: Never Used   Substance and Sexual Activity   • Alcohol use: Yes     Alcohol/week: 1.8 oz     Types: 3 Glasses of wine per week     Comment: socially   • Drug use: No   • Sexual activity: Not Currently   Other Topics Concern   • Not on file   Social History Narrative    .  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          Allergies   Allergen Reactions   • Lisinopril Cough        Outpatient Medications Prior to Visit   Medication Sig Dispense Refill   • balsalazide (COLAZAL) 750 MG capsule Take 750 mg by mouth Daily.     • Calcium-Magnesium-Vitamin D (CALCIUM 500 PO) Take by mouth.     • Cholecalciferol (VITAMIN D3) 2000 UNITS tablet Take 2 tablets by mouth daily.     • citalopram (CeleXA) 20 MG tablet Take 1 tablet by mouth Daily. 90 tablet 3   • esomeprazole (nexIUM) 20 MG capsule Take 20 mg by mouth Every Morning Before Breakfast.     • estradiol (ESTRACE) 0.5 MG tablet      • progesterone (PROMETRIUM) 100 MG capsule      • valACYclovir (VALTREX) 500 MG tablet Take 500 mg by mouth 2 (Two) Times a Day.     • clindamycin (CLEOCIN T) 1 % lotion APPLY THIN LAYER TO AFFECTED AREA(S) TWICE DAILY  3   • fluorouracil (EFUDEX) 5 % cream APPLY TOPICALLY TO AFFECTED AREA(S) TWICE DAILY FOR 2 WEEKS  0   • hydrochlorothiazide (HYDRODIURIL) 50 MG tablet Take 1 tablet by mouth Daily. 30 tablet 3     No facility-administered medications prior to visit.        Objective     Vitals:    12/06/18 1252   BP: 122/82   Pulse: 73   Resp: 13   SpO2: 98%       Physical Exam   Constitutional: She appears well-developed and well-nourished. No distress.   HENT:   Head: Normocephalic and atraumatic.   Cardiovascular: Normal rate, regular rhythm and normal heart sounds. Exam reveals no gallop and no friction rub.   No murmur heard.  Pulmonary/Chest: Effort normal and breath sounds normal. No respiratory distress. She has no wheezes. She has no rhonchi. She has no rales.   Abdominal:  "Soft. Bowel sounds are normal. She exhibits no distension. There is no tenderness.   Skin: Skin is warm and dry.       ASSESSMENT/PLAN       Problem List Items Addressed This Visit        Cardiovascular and Mediastinum    Hypertension - Primary well controlled, continue current medication regimen    Relevant Medications    hydrochlorothiazide (HYDRODIURIL) 50 MG tablet    Other Relevant Orders    Basic metabolic panel      Other Visit Diagnoses     Need for vaccination        Relevant Orders    Shingrix Vaccine (Completed)            Patient Instructions   Try to exercise at least 3 times per week for 30 minutes per session.    DASH Eating Plan  DASH stands for \"Dietary Approaches to Stop Hypertension.\" The DASH eating plan is a healthy eating plan that has been shown to reduce high blood pressure (hypertension). It may also reduce your risk for type 2 diabetes, heart disease, and stroke. The DASH eating plan may also help with weight loss.  What are tips for following this plan?  General guidelines  · Avoid eating more than 2,300 mg (milligrams) of salt (sodium) a day. If you have hypertension, you may need to reduce your sodium intake to 1,500 mg a day.  · Limit alcohol intake to no more than 1 drink a day for nonpregnant women and 2 drinks a day for men. One drink equals 12 oz of beer, 5 oz of wine, or 1½ oz of hard liquor.  · Work with your health care provider to maintain a healthy body weight or to lose weight. Ask what an ideal weight is for you.  · Get at least 30 minutes of exercise that causes your heart to beat faster (aerobic exercise) most days of the week. Activities may include walking, swimming, or biking.  · Work with your health care provider or diet and nutrition specialist (dietitian) to adjust your eating plan to your individual calorie needs.  Reading food labels  · Check food labels for the amount of sodium per serving. Choose foods with less than 5 percent of the Daily Value of sodium. " "Generally, foods with less than 300 mg of sodium per serving fit into this eating plan.  · To find whole grains, look for the word \"whole\" as the first word in the ingredient list.  Shopping  · Buy products labeled as \"low-sodium\" or \"no salt added.\"  · Buy fresh foods. Avoid canned foods and premade or frozen meals.  Cooking  · Avoid adding salt when cooking. Use salt-free seasonings or herbs instead of table salt or sea salt. Check with your health care provider or pharmacist before using salt substitutes.  · Do not ryder foods. Cook foods using healthy methods such as baking, boiling, grilling, and broiling instead.  · Cook with heart-healthy oils, such as olive, canola, soybean, or sunflower oil.  Meal planning    · Eat a balanced diet that includes:  ? 5 or more servings of fruits and vegetables each day. At each meal, try to fill half of your plate with fruits and vegetables.  ? Up to 6-8 servings of whole grains each day.  ? Less than 6 oz of lean meat, poultry, or fish each day. A 3-oz serving of meat is about the same size as a deck of cards. One egg equals 1 oz.  ? 2 servings of low-fat dairy each day.  ? A serving of nuts, seeds, or beans 5 times each week.  ? Heart-healthy fats. Healthy fats called Omega-3 fatty acids are found in foods such as flaxseeds and coldwater fish, like sardines, salmon, and mackerel.  · Limit how much you eat of the following:  ? Canned or prepackaged foods.  ? Food that is high in trans fat, such as fried foods.  ? Food that is high in saturated fat, such as fatty meat.  ? Sweets, desserts, sugary drinks, and other foods with added sugar.  ? Full-fat dairy products.  · Do not salt foods before eating.  · Try to eat at least 2 vegetarian meals each week.  · Eat more home-cooked food and less restaurant, buffet, and fast food.  · When eating at a restaurant, ask that your food be prepared with less salt or no salt, if possible.  What foods are recommended?  The items listed may " not be a complete list. Talk with your dietitian about what dietary choices are best for you.  Grains  Whole-grain or whole-wheat bread. Whole-grain or whole-wheat pasta. Brown rice. Oatmeal. Quinoa. Bulgur. Whole-grain and low-sodium cereals. Janell bread. Low-fat, low-sodium crackers. Whole-wheat flour tortillas.  Vegetables  Fresh or frozen vegetables (raw, steamed, roasted, or grilled). Low-sodium or reduced-sodium tomato and vegetable juice. Low-sodium or reduced-sodium tomato sauce and tomato paste. Low-sodium or reduced-sodium canned vegetables.  Fruits  All fresh, dried, or frozen fruit. Canned fruit in natural juice (without added sugar).  Meat and other protein foods  Skinless chicken or turkey. Ground chicken or turkey. Pork with fat trimmed off. Fish and seafood. Egg whites. Dried beans, peas, or lentils. Unsalted nuts, nut butters, and seeds. Unsalted canned beans. Lean cuts of beef with fat trimmed off. Low-sodium, lean deli meat.  Dairy  Low-fat (1%) or fat-free (skim) milk. Fat-free, low-fat, or reduced-fat cheeses. Nonfat, low-sodium ricotta or cottage cheese. Low-fat or nonfat yogurt. Low-fat, low-sodium cheese.  Fats and oils  Soft margarine without trans fats. Vegetable oil. Low-fat, reduced-fat, or light mayonnaise and salad dressings (reduced-sodium). Canola, safflower, olive, soybean, and sunflower oils. Avocado.  Seasoning and other foods  Herbs. Spices. Seasoning mixes without salt. Unsalted popcorn and pretzels. Fat-free sweets.  What foods are not recommended?  The items listed may not be a complete list. Talk with your dietitian about what dietary choices are best for you.  Grains  Baked goods made with fat, such as croissants, muffins, or some breads. Dry pasta or rice meal packs.  Vegetables  Creamed or fried vegetables. Vegetables in a cheese sauce. Regular canned vegetables (not low-sodium or reduced-sodium). Regular canned tomato sauce and paste (not low-sodium or reduced-sodium).  Regular tomato and vegetable juice (not low-sodium or reduced-sodium). Pickles. Olives.  Fruits  Canned fruit in a light or heavy syrup. Fried fruit. Fruit in cream or butter sauce.  Meat and other protein foods  Fatty cuts of meat. Ribs. Fried meat. Valentino. Sausage. Bologna and other processed lunch meats. Salami. Fatback. Hotdogs. Bratwurst. Salted nuts and seeds. Canned beans with added salt. Canned or smoked fish. Whole eggs or egg yolks. Chicken or turkey with skin.  Dairy  Whole or 2% milk, cream, and half-and-half. Whole or full-fat cream cheese. Whole-fat or sweetened yogurt. Full-fat cheese. Nondairy creamers. Whipped toppings. Processed cheese and cheese spreads.  Fats and oils  Butter. Stick margarine. Lard. Shortening. Ghee. Valentino fat. Tropical oils, such as coconut, palm kernel, or palm oil.  Seasoning and other foods  Salted popcorn and pretzels. Onion salt, garlic salt, seasoned salt, table salt, and sea salt. [place] sauce. Tartar sauce. Barbecue sauce. Teriyaki sauce. Soy sauce, including reduced-sodium. Steak sauce. Canned and packaged gravies. Fish sauce. Oyster sauce. Cocktail sauce. Horseradish that you find on the shelf. Ketchup. Mustard. Meat flavorings and tenderizers. Bouillon cubes. Hot sauce and Tabasco sauce. Premade or packaged marinades. Premade or packaged taco seasonings. Relishes. Regular salad dressings.  Where to find more information:  · National Heart, Lung, and Blood [place]: www.nhlbi.nih.gov  · American Heart Association: www.heart.org  Summary  · The DASH eating plan is a healthy eating plan that has been shown to reduce high blood pressure (hypertension). It may also reduce your risk for type 2 diabetes, heart disease, and stroke.  · With the DASH eating plan, you should limit salt (sodium) intake to 2,300 mg a day. If you have hypertension, you may need to reduce your sodium intake to 1,500 mg a day.  · When on the DASH eating plan, aim to eat more fresh fruits and  vegetables, whole grains, lean proteins, low-fat dairy, and heart-healthy fats.  · Work with your health care provider or diet and nutrition specialist (dietitian) to adjust your eating plan to your individual calorie needs.  This information is not intended to replace advice given to you by your health care provider. Make sure you discuss any questions you have with your health care provider.  Document Released: 12/06/2012 Document Revised: 12/11/2017 Document Reviewed: 12/11/2017  [employer] Interactive Patient Education © 2018 [employer]      Return in about 8 months (around 8/6/2019) for Annual.      Luana Angel MD  12/06/18

## 2018-12-06 NOTE — PATIENT INSTRUCTIONS
"Try to exercise at least 3 times per week for 30 minutes per session.    DASH Eating Plan  DASH stands for \"Dietary Approaches to Stop Hypertension.\" The DASH eating plan is a healthy eating plan that has been shown to reduce high blood pressure (hypertension). It may also reduce your risk for type 2 diabetes, heart disease, and stroke. The DASH eating plan may also help with weight loss.  What are tips for following this plan?  General guidelines  · Avoid eating more than 2,300 mg (milligrams) of salt (sodium) a day. If you have hypertension, you may need to reduce your sodium intake to 1,500 mg a day.  · Limit alcohol intake to no more than 1 drink a day for nonpregnant women and 2 drinks a day for men. One drink equals 12 oz of beer, 5 oz of wine, or 1½ oz of hard liquor.  · Work with your health care provider to maintain a healthy body weight or to lose weight. Ask what an ideal weight is for you.  · Get at least 30 minutes of exercise that causes your heart to beat faster (aerobic exercise) most days of the week. Activities may include walking, swimming, or biking.  · Work with your health care provider or diet and nutrition specialist (dietitian) to adjust your eating plan to your individual calorie needs.  Reading food labels  · Check food labels for the amount of sodium per serving. Choose foods with less than 5 percent of the Daily Value of sodium. Generally, foods with less than 300 mg of sodium per serving fit into this eating plan.  · To find whole grains, look for the word \"whole\" as the first word in the ingredient list.  Shopping  · Buy products labeled as \"low-sodium\" or \"no salt added.\"  · Buy fresh foods. Avoid canned foods and premade or frozen meals.  Cooking  · Avoid adding salt when cooking. Use salt-free seasonings or herbs instead of table salt or sea salt. Check with your health care provider or pharmacist before using salt substitutes.  · Do not ryder foods. Cook foods using healthy methods " such as baking, boiling, grilling, and broiling instead.  · Cook with heart-healthy oils, such as olive, canola, soybean, or sunflower oil.  Meal planning    · Eat a balanced diet that includes:  ? 5 or more servings of fruits and vegetables each day. At each meal, try to fill half of your plate with fruits and vegetables.  ? Up to 6-8 servings of whole grains each day.  ? Less than 6 oz of lean meat, poultry, or fish each day. A 3-oz serving of meat is about the same size as a deck of cards. One egg equals 1 oz.  ? 2 servings of low-fat dairy each day.  ? A serving of nuts, seeds, or beans 5 times each week.  ? Heart-healthy fats. Healthy fats called Omega-3 fatty acids are found in foods such as flaxseeds and coldwater fish, like sardines, salmon, and mackerel.  · Limit how much you eat of the following:  ? Canned or prepackaged foods.  ? Food that is high in trans fat, such as fried foods.  ? Food that is high in saturated fat, such as fatty meat.  ? Sweets, desserts, sugary drinks, and other foods with added sugar.  ? Full-fat dairy products.  · Do not salt foods before eating.  · Try to eat at least 2 vegetarian meals each week.  · Eat more home-cooked food and less restaurant, buffet, and fast food.  · When eating at a restaurant, ask that your food be prepared with less salt or no salt, if possible.  What foods are recommended?  The items listed may not be a complete list. Talk with your dietitian about what dietary choices are best for you.  Grains  Whole-grain or whole-wheat bread. Whole-grain or whole-wheat pasta. Brown rice. Oatmeal. Quinoa. Bulgur. Whole-grain and low-sodium cereals. Janell bread. Low-fat, low-sodium crackers. Whole-wheat flour tortillas.  Vegetables  Fresh or frozen vegetables (raw, steamed, roasted, or grilled). Low-sodium or reduced-sodium tomato and vegetable juice. Low-sodium or reduced-sodium tomato sauce and tomato paste. Low-sodium or reduced-sodium canned vegetables.  Fruits  All  fresh, dried, or frozen fruit. Canned fruit in natural juice (without added sugar).  Meat and other protein foods  Skinless chicken or turkey. Ground chicken or turkey. Pork with fat trimmed off. Fish and seafood. Egg whites. Dried beans, peas, or lentils. Unsalted nuts, nut butters, and seeds. Unsalted canned beans. Lean cuts of beef with fat trimmed off. Low-sodium, lean deli meat.  Dairy  Low-fat (1%) or fat-free (skim) milk. Fat-free, low-fat, or reduced-fat cheeses. Nonfat, low-sodium ricotta or cottage cheese. Low-fat or nonfat yogurt. Low-fat, low-sodium cheese.  Fats and oils  Soft margarine without trans fats. Vegetable oil. Low-fat, reduced-fat, or light mayonnaise and salad dressings (reduced-sodium). Canola, safflower, olive, soybean, and sunflower oils. Avocado.  Seasoning and other foods  Herbs. Spices. Seasoning mixes without salt. Unsalted popcorn and pretzels. Fat-free sweets.  What foods are not recommended?  The items listed may not be a complete list. Talk with your dietitian about what dietary choices are best for you.  Grains  Baked goods made with fat, such as croissants, muffins, or some breads. Dry pasta or rice meal packs.  Vegetables  Creamed or fried vegetables. Vegetables in a cheese sauce. Regular canned vegetables (not low-sodium or reduced-sodium). Regular canned tomato sauce and paste (not low-sodium or reduced-sodium). Regular tomato and vegetable juice (not low-sodium or reduced-sodium). Pickles. Olives.  Fruits  Canned fruit in a light or heavy syrup. Fried fruit. Fruit in cream or butter sauce.  Meat and other protein foods  Fatty cuts of meat. Ribs. Fried meat. Valentino. Sausage. Bologna and other processed lunch meats. Salami. Fatback. Hotdogs. Bratwurst. Salted nuts and seeds. Canned beans with added salt. Canned or smoked fish. Whole eggs or egg yolks. Chicken or turkey with skin.  Dairy  Whole or 2% milk, cream, and half-and-half. Whole or full-fat cream cheese. Whole-fat or  sweetened yogurt. Full-fat cheese. Nondairy creamers. Whipped toppings. Processed cheese and cheese spreads.  Fats and oils  Butter. Stick margarine. Lard. Shortening. Ghee. Valentino fat. Tropical oils, such as coconut, palm kernel, or palm oil.  Seasoning and other foods  Salted popcorn and pretzels. Onion salt, garlic salt, seasoned salt, table salt, and sea salt. Worcestershire sauce. Tartar sauce. Barbecue sauce. Teriyaki sauce. Soy sauce, including reduced-sodium. Steak sauce. Canned and packaged gravies. Fish sauce. Oyster sauce. Cocktail sauce. Horseradish that you find on the shelf. Ketchup. Mustard. Meat flavorings and tenderizers. Bouillon cubes. Hot sauce and Tabasco sauce. Premade or packaged marinades. Premade or packaged taco seasonings. Relishes. Regular salad dressings.  Where to find more information:  · National Heart, Lung, and Blood [place]: www.nhlbi.nih.gov  · American Heart Association: www.heart.org  Summary  · The DASH eating plan is a healthy eating plan that has been shown to reduce high blood pressure (hypertension). It may also reduce your risk for type 2 diabetes, heart disease, and stroke.  · With the DASH eating plan, you should limit salt (sodium) intake to 2,300 mg a day. If you have hypertension, you may need to reduce your sodium intake to 1,500 mg a day.  · When on the DASH eating plan, aim to eat more fresh fruits and vegetables, whole grains, lean proteins, low-fat dairy, and heart-healthy fats.  · Work with your health care provider or diet and nutrition specialist (dietitian) to adjust your eating plan to your individual calorie needs.  This information is not intended to replace advice given to you by your health care provider. Make sure you discuss any questions you have with your health care provider.  Document Released: 12/06/2012 Document Revised: 12/11/2017 Document Reviewed: 12/11/2017  [employer] Interactive Patient Education © 2018 [employer]

## 2018-12-07 ENCOUNTER — TELEPHONE (OUTPATIENT)
Dept: FAMILY MEDICINE CLINIC | Facility: CLINIC | Age: 58
End: 2018-12-07

## 2018-12-07 NOTE — TELEPHONE ENCOUNTER
Called and spoke with patient to let her know her labs are good.     ----- Message from Luana Angel MD sent at 12/6/2018  9:06 PM EST -----  Please contact Rakel Velarde and let pt know all labs looked good.

## 2019-04-02 ENCOUNTER — OFFICE VISIT (OUTPATIENT)
Dept: GASTROENTEROLOGY | Facility: CLINIC | Age: 59
End: 2019-04-02

## 2019-04-02 VITALS
WEIGHT: 196.4 LBS | DIASTOLIC BLOOD PRESSURE: 82 MMHG | TEMPERATURE: 98 F | HEIGHT: 65 IN | BODY MASS INDEX: 32.72 KG/M2 | SYSTOLIC BLOOD PRESSURE: 124 MMHG

## 2019-04-02 DIAGNOSIS — K90.9 DIARRHEA DUE TO MALABSORPTION: ICD-10-CM

## 2019-04-02 DIAGNOSIS — K51.311 ULCERATIVE RECTOSIGMOIDITIS WITH RECTAL BLEEDING (HCC): Primary | ICD-10-CM

## 2019-04-02 DIAGNOSIS — K21.9 GASTRO-ESOPHAGEAL REFLUX DISEASE WITHOUT ESOPHAGITIS: ICD-10-CM

## 2019-04-02 DIAGNOSIS — R19.7 DIARRHEA DUE TO MALABSORPTION: ICD-10-CM

## 2019-04-02 PROCEDURE — 99214 OFFICE O/P EST MOD 30 MIN: CPT | Performed by: INTERNAL MEDICINE

## 2019-04-02 RX ORDER — SODIUM CHLORIDE, SODIUM LACTATE, POTASSIUM CHLORIDE, CALCIUM CHLORIDE 600; 310; 30; 20 MG/100ML; MG/100ML; MG/100ML; MG/100ML
30 INJECTION, SOLUTION INTRAVENOUS CONTINUOUS
Status: CANCELLED | OUTPATIENT
Start: 2019-05-14

## 2019-04-02 RX ORDER — HYDROCORTISONE 100 MG/60ML
100 SUSPENSION RECTAL NIGHTLY
Qty: 30 EACH | Refills: 1 | Status: SHIPPED | OUTPATIENT
Start: 2019-04-02 | End: 2019-05-13

## 2019-04-02 NOTE — PROGRESS NOTES
Chief Complaint   Patient presents with   • Follow-up     ulcerative rectosigmoiditis      Subjective     HPI  Rakel THOMAS Velarde is a 58 y.o. female who presents For follow-up of ulcerative colitis and GERD.  Her last colonoscopy was in 2017.    She is here for follow up.  She reports a flare since the end of November.  Having more loose stools.  Varies from 2 BMs to multiple times per day with urgency.  No rectal bleeding. Lots of pressure. Stool is oily.  She has increased the balsalazide to 1500mg daily but not much help.  No change in her weight.    GERD is stable on nexium.    Past Medical History:   Diagnosis Date   • Angular cheilitis 1/17/2016   • Anxiety    • GERD (gastroesophageal reflux disease)    • Hallux valgus     followed by Ortho, Dr. Humphreys   • HSV infection     followed by OB/GYN, Dr. Maria Antonia Gray   • Hyperlipidemia    • Hypertension    • Obesity    • Osteopenia    • Ulcerative colitis (CMS/HCC)     followed by Dr. Carballo, Q2 yr colonoscopies   • Vitamin D deficiency        Social History     Socioeconomic History   • Marital status:      Spouse name: Not on file   • Number of children: Not on file   • Years of education: Not on file   • Highest education level: Not on file   Tobacco Use   • Smoking status: Never Smoker   • Smokeless tobacco: Never Used   Substance and Sexual Activity   • Alcohol use: Yes     Alcohol/week: 1.8 oz     Types: 3 Glasses of wine per week     Comment: socially   • Drug use: No   • Sexual activity: Not Currently   Social History Narrative    Lives at home with her .  Marriage is strained.  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           Current Outpatient Medications:   •  balsalazide (COLAZAL) 750 MG capsule, Take 750 mg by mouth Daily., Disp: , Rfl:   •  Calcium-Magnesium-Vitamin D (CALCIUM 500 PO), Take by mouth., Disp: , Rfl:   •  Cholecalciferol (VITAMIN D3) 2000 UNITS tablet, Take 2 tablets by mouth daily., Disp:  , Rfl:   •  citalopram (CeleXA) 20 MG tablet, Take 1 tablet by mouth Daily., Disp: 90 tablet, Rfl: 3  •  esomeprazole (nexIUM) 20 MG capsule, Take 20 mg by mouth Every Morning Before Breakfast., Disp: , Rfl:   •  estradiol (ESTRACE) 0.5 MG tablet, , Disp: , Rfl:   •  hydrochlorothiazide (HYDRODIURIL) 50 MG tablet, Take 1 tablet by mouth Daily., Disp: 30 tablet, Rfl: 11  •  progesterone (PROMETRIUM) 100 MG capsule, , Disp: , Rfl:   •  valACYclovir (VALTREX) 500 MG tablet, Take 500 mg by mouth 2 (Two) Times a Day., Disp: , Rfl:   •  hydrocortisone (CORTENEMA) 100 MG/60ML enema, Insert 1 enema into the rectum Every Night., Disp: 30 each, Rfl: 1    Review of Systems   Constitutional: Negative for activity change, appetite change, chills and fever.   HENT: Negative for trouble swallowing.    Respiratory: Negative.    Cardiovascular: Negative.  Negative for chest pain.   Gastrointestinal: Positive for diarrhea. Negative for abdominal distention, abdominal pain, anal bleeding, constipation, nausea and vomiting.   Genitourinary: Negative for dysuria, frequency and hematuria.       Objective   Vitals:    04/02/19 1259   BP: 124/82   Temp: 98 °F (36.7 °C)         04/02/19  1259   Weight: 89.1 kg (196 lb 6.4 oz)     Body mass index is 32.68 kg/m².      Physical Exam   Constitutional: She is oriented to person, place, and time. She appears well-developed and well-nourished. No distress.   HENT:   Head: Normocephalic and atraumatic.   Right Ear: External ear normal.   Left Ear: External ear normal.   Nose: Nose normal.   Mouth/Throat: Oropharynx is clear and moist.   Eyes: Conjunctivae and EOM are normal. Right eye exhibits no discharge. Left eye exhibits no discharge. No scleral icterus.   Neck: Normal range of motion. Neck supple. No thyromegaly present.   No supraclavicular adenopathy   Cardiovascular: Normal rate, regular rhythm, normal heart sounds and intact distal pulses. Exam reveals no gallop.   No murmur heard.  No  lower extremity edema   Pulmonary/Chest: Effort normal and breath sounds normal. No respiratory distress. She has no wheezes.   Abdominal: Soft. Normal appearance and bowel sounds are normal. She exhibits no distension and no mass. There is no hepatosplenomegaly. There is no tenderness. There is no rigidity, no rebound and no guarding. No hernia.   Genitourinary:   Genitourinary Comments: Rectal exam deferred   Musculoskeletal: Normal range of motion. She exhibits no edema or tenderness.   No atrophy of upper or lower extremities.  Normal digits and nails of both hands.   Lymphadenopathy:     She has no cervical adenopathy.   Neurological: She is alert and oriented to person, place, and time. She displays no atrophy. Coordination normal.   Skin: Skin is warm and dry. No rash noted. She is not diaphoretic. No erythema.   Psychiatric: She has a normal mood and affect. Her behavior is normal. Judgment and thought content normal.   Vitals reviewed.      WBC   Date Value Ref Range Status   01/15/2018 6.76 4.50 - 10.70 10*3/mm3 Final     RBC   Date Value Ref Range Status   01/15/2018 4.26 3.90 - 5.20 10*6/mm3 Final     Hemoglobin   Date Value Ref Range Status   01/15/2018 13.4 11.9 - 15.5 g/dL Final     Hematocrit   Date Value Ref Range Status   01/15/2018 40.5 35.6 - 45.5 % Final     MCV   Date Value Ref Range Status   01/15/2018 95.1 80.5 - 98.2 fL Final     MCH   Date Value Ref Range Status   01/15/2018 31.5 26.9 - 32.0 pg Final     MCHC   Date Value Ref Range Status   01/15/2018 33.1 32.4 - 36.3 g/dL Final     RDW   Date Value Ref Range Status   01/15/2018 13.3 (H) 11.7 - 13.0 % Final     Platelets   Date Value Ref Range Status   01/15/2018 273 140 - 500 10*3/mm3 Final     Neutrophil Rel %   Date Value Ref Range Status   01/15/2018 51.9 42.7 - 76.0 % Final     Lymphocyte Rel %   Date Value Ref Range Status   01/15/2018 39.5 19.6 - 45.3 % Final     Monocyte Rel %   Date Value Ref Range Status   01/15/2018 6.4 5.0 -  12.0 % Final     Eosinophil Rel %   Date Value Ref Range Status   01/15/2018 1.5 0.3 - 6.2 % Final     Basophil Rel %   Date Value Ref Range Status   01/15/2018 0.4 0.0 - 1.5 % Final     Neutrophils Absolute   Date Value Ref Range Status   01/15/2018 3.51 1.90 - 8.10 10*3/mm3 Final     Lymphocytes Absolute   Date Value Ref Range Status   01/15/2018 2.67 0.90 - 4.80 10*3/mm3 Final     Monocytes Absolute   Date Value Ref Range Status   01/15/2018 0.43 0.20 - 1.20 10*3/mm3 Final     Eosinophils Absolute   Date Value Ref Range Status   01/15/2018 0.10 0.00 - 0.70 10*3/mm3 Final     Basophils Absolute   Date Value Ref Range Status   01/15/2018 0.03 0.00 - 0.20 10*3/mm3 Final       Lab Results   Component Value Date    BUN 10 12/06/2018    CREATININE 0.83 12/06/2018    EGFRIFNONA 70 12/06/2018    EGFRIFAFRI 85 12/06/2018    BCR 12.0 12/06/2018    CO2 29.1 (H) 12/06/2018    CALCIUM 10.0 12/06/2018    PROTENTOTREF 7.7 01/15/2018    ALBUMIN 4.50 01/15/2018    LABIL2 1.4 01/15/2018    AST 18 01/15/2018    ALT 11 01/15/2018         Imaging Results (last 7 days)     ** No results found for the last 168 hours. **            Assessment/Plan    Left-sided ulcerative colitis: Flare since November.  On balsalazide.  Due for colonoscopy    Diarrhea: Flare with above    GERD: Stable on Nexium    Plan  We will see if we can get her hydrocortisone enemas to try to settle down her left-sided disease  Continue the balsalazide  Colonoscopy for surveillance as well as to assess her flare  Further recommendations after endoscopy    Rakel was seen today for follow-up.    Diagnoses and all orders for this visit:    Ulcerative rectosigmoiditis with rectal bleeding (CMS/HCC)  -     Case Request; Standing  -     Follow Anesthesia Guidelines / Standing Orders; Future  -     Implement Anesthesia Orders Day of Procedure; Standing  -     Obtain Informed Consent; Standing  -     Verify bowel prep was successful; Standing  -     lactated ringers  infusion  -     Case Request    Diarrhea due to malabsorption  -     Case Request; Standing  -     Follow Anesthesia Guidelines / Standing Orders; Future  -     Implement Anesthesia Orders Day of Procedure; Standing  -     Obtain Informed Consent; Standing  -     Verify bowel prep was successful; Standing  -     lactated ringers infusion  -     Case Request    Gastro-esophageal reflux disease without esophagitis    Other orders  -     hydrocortisone (CORTENEMA) 100 MG/60ML enema; Insert 1 enema into the rectum Every Night.        Dictated utilizing SI-BONE

## 2019-04-11 ENCOUNTER — APPOINTMENT (OUTPATIENT)
Dept: WOMENS IMAGING | Facility: HOSPITAL | Age: 59
End: 2019-04-11

## 2019-04-11 PROCEDURE — 77067 SCR MAMMO BI INCL CAD: CPT | Performed by: RADIOLOGY

## 2019-04-11 PROCEDURE — 77063 BREAST TOMOSYNTHESIS BI: CPT | Performed by: RADIOLOGY

## 2019-05-03 ENCOUNTER — APPOINTMENT (OUTPATIENT)
Dept: WOMENS IMAGING | Facility: HOSPITAL | Age: 59
End: 2019-05-03

## 2019-05-03 PROCEDURE — 76641 ULTRASOUND BREAST COMPLETE: CPT | Performed by: RADIOLOGY

## 2019-05-03 PROCEDURE — MDREVIEWSP: Performed by: RADIOLOGY

## 2019-05-03 PROCEDURE — 77065 DX MAMMO INCL CAD UNI: CPT | Performed by: RADIOLOGY

## 2019-05-03 PROCEDURE — 77061 BREAST TOMOSYNTHESIS UNI: CPT | Performed by: RADIOLOGY

## 2019-05-03 PROCEDURE — G0279 TOMOSYNTHESIS, MAMMO: HCPCS | Performed by: RADIOLOGY

## 2019-05-14 ENCOUNTER — HOSPITAL ENCOUNTER (OUTPATIENT)
Facility: HOSPITAL | Age: 59
Setting detail: HOSPITAL OUTPATIENT SURGERY
Discharge: HOME | End: 2019-05-14
Attending: INTERNAL MEDICINE | Admitting: INTERNAL MEDICINE

## 2019-05-14 ENCOUNTER — ANESTHESIA EVENT (OUTPATIENT)
Dept: GASTROENTEROLOGY | Facility: HOSPITAL | Age: 59
End: 2019-05-14

## 2019-05-14 ENCOUNTER — ANESTHESIA (OUTPATIENT)
Dept: GASTROENTEROLOGY | Facility: HOSPITAL | Age: 59
End: 2019-05-14

## 2019-05-14 VITALS
TEMPERATURE: 98 F | RESPIRATION RATE: 16 BRPM | SYSTOLIC BLOOD PRESSURE: 136 MMHG | BODY MASS INDEX: 32.71 KG/M2 | DIASTOLIC BLOOD PRESSURE: 78 MMHG | WEIGHT: 196.31 LBS | OXYGEN SATURATION: 99 % | HEART RATE: 56 BPM | HEIGHT: 65 IN

## 2019-05-14 DIAGNOSIS — K51.311 ULCERATIVE RECTOSIGMOIDITIS WITH RECTAL BLEEDING (HCC): ICD-10-CM

## 2019-05-14 DIAGNOSIS — K90.9 DIARRHEA DUE TO MALABSORPTION: ICD-10-CM

## 2019-05-14 DIAGNOSIS — R19.7 DIARRHEA DUE TO MALABSORPTION: ICD-10-CM

## 2019-05-14 PROCEDURE — S0260 H&P FOR SURGERY: HCPCS | Performed by: INTERNAL MEDICINE

## 2019-05-14 PROCEDURE — 88305 TISSUE EXAM BY PATHOLOGIST: CPT | Performed by: INTERNAL MEDICINE

## 2019-05-14 PROCEDURE — 25010000002 PROPOFOL 10 MG/ML EMULSION: Performed by: ANESTHESIOLOGY

## 2019-05-14 PROCEDURE — 45380 COLONOSCOPY AND BIOPSY: CPT | Performed by: INTERNAL MEDICINE

## 2019-05-14 RX ORDER — LIDOCAINE HYDROCHLORIDE 20 MG/ML
INJECTION, SOLUTION INFILTRATION; PERINEURAL AS NEEDED
Status: DISCONTINUED | OUTPATIENT
Start: 2019-05-14 | End: 2019-05-14 | Stop reason: SURG

## 2019-05-14 RX ORDER — SODIUM CHLORIDE, SODIUM LACTATE, POTASSIUM CHLORIDE, CALCIUM CHLORIDE 600; 310; 30; 20 MG/100ML; MG/100ML; MG/100ML; MG/100ML
30 INJECTION, SOLUTION INTRAVENOUS CONTINUOUS
Status: DISCONTINUED | OUTPATIENT
Start: 2019-05-14 | End: 2019-05-14 | Stop reason: HOSPADM

## 2019-05-14 RX ORDER — PROPOFOL 10 MG/ML
VIAL (ML) INTRAVENOUS AS NEEDED
Status: DISCONTINUED | OUTPATIENT
Start: 2019-05-14 | End: 2019-05-14 | Stop reason: SURG

## 2019-05-14 RX ORDER — PROPOFOL 10 MG/ML
VIAL (ML) INTRAVENOUS CONTINUOUS PRN
Status: DISCONTINUED | OUTPATIENT
Start: 2019-05-14 | End: 2019-05-14 | Stop reason: SURG

## 2019-05-14 RX ADMIN — PROPOFOL 100 MG: 10 INJECTION, EMULSION INTRAVENOUS at 08:34

## 2019-05-14 RX ADMIN — LIDOCAINE HYDROCHLORIDE 60 MG: 20 INJECTION, SOLUTION INFILTRATION; PERINEURAL at 08:34

## 2019-05-14 RX ADMIN — PROPOFOL 40 MG: 10 INJECTION, EMULSION INTRAVENOUS at 08:37

## 2019-05-14 RX ADMIN — SODIUM CHLORIDE, POTASSIUM CHLORIDE, SODIUM LACTATE AND CALCIUM CHLORIDE 30 ML/HR: 600; 310; 30; 20 INJECTION, SOLUTION INTRAVENOUS at 08:28

## 2019-05-14 RX ADMIN — PROPOFOL 160 MCG/KG/MIN: 10 INJECTION, EMULSION INTRAVENOUS at 08:34

## 2019-05-14 NOTE — H&P
[facility]  Pre Procedure History & Physical    Chief Complaint: ulcerative colitis, diarrhea      HPI: 58 y.o. female who presents For follow-up of ulcerative colitis and GERD.  Her last colonoscopy was in 2017.     She is here for follow up.  She reports a flare since the end of November.  Having more loose stools.  Varies from 2 BMs to multiple times per day with urgency.  No rectal bleeding. Lots of pressure. Stool is oily.  She has increased the balsalazide to 1500mg daily but not much help.  No change in her weight.     GERD is stable on nexium.    Past Medical History:   Past Medical History:   Diagnosis Date   • Angular cheilitis 1/17/2016   • Anxiety    • GERD (gastroesophageal reflux disease)    • Hallux valgus     followed by Ortho, Dr. Humphreys   • HSV infection     followed by OB/GYN, Dr. Maria Antonia Gray   • Hyperlipidemia    • Hypertension    • Obesity    • Osteopenia    • Ulcerative colitis (CMS/HCC)     followed by Dr. Carballo, Q2 yr colonoscopies   • Vitamin D deficiency        Family History:  Family History   Problem Relation Age of Onset   • Breast cancer Mother 50   • Emphysema Mother    • Macular degeneration Mother    • Hypertension Mother    • Pancreatitis Mother    • Hypertension Father    • Dementia Father    • Parkinsonism Father    • Depression Brother    • Esophageal cancer Brother    • Hypertension Brother    • Melanoma Brother    • Lymphoma Maternal Grandmother    • Macular degeneration Maternal Grandmother    • Macular degeneration Maternal Aunt    • Macular degeneration Maternal Uncle    • Malig Hyperthermia Neg Hx        Social History:   reports that she has never smoked. She has never used smokeless tobacco. She reports that she drinks about 1.8 oz of alcohol per week. She reports that she does not use drugs.    Medications:   Medications Prior to Admission   Medication Sig Dispense Refill Last Dose   • balsalazide (COLAZAL) 750 MG capsule Take 750 mg by  mouth Daily.   Taking   • Calcium-Magnesium-Vitamin D (CALCIUM 500 PO) Take by mouth.   Taking   • Cholecalciferol (VITAMIN D3) 2000 UNITS tablet Take 2 tablets by mouth daily.   Taking   • citalopram (CeleXA) 20 MG tablet Take 1 tablet by mouth Daily. 90 tablet 3 Taking   • esomeprazole (nexIUM) 20 MG capsule Take 20 mg by mouth Every Morning Before Breakfast.   Taking   • estradiol (ESTRACE) 0.5 MG tablet    Taking   • hydrochlorothiazide (HYDRODIURIL) 50 MG tablet Take 1 tablet by mouth Daily. 30 tablet 11 Taking   • progesterone (PROMETRIUM) 100 MG capsule    Taking   • valACYclovir (VALTREX) 500 MG tablet Take 500 mg by mouth 2 (Two) Times a Day.   Taking       Allergies:  Lisinopril    ROS:    Pertinent items are noted in HPI     Objective     There were no vitals taken for this visit.    Physical Exam   Constitutional: Pt is oriented to person, place, and time and well-developed, well-nourished, and in no distress.   HENT:   Mouth/Throat: Oropharynx is clear and moist.   Neck: Normal range of motion. Neck supple.   Cardiovascular: Normal rate, regular rhythm and normal heart sounds.    Pulmonary/Chest: Effort normal and breath sounds normal. No respiratory distress. No  wheezes.   Abdominal: Soft. Bowel sounds are normal.   Skin: Skin is warm and dry.   Psychiatric: Mood, memory, affect and judgment normal.     Assessment/Plan     Diagnosis: ulcerative colitis, diarrhea      Anticipated Surgical Procedure:    Colonoscopy    The risks, benefits, and alternatives of this procedure have been discussed with the patient or the responsible party- the patient understands and agrees to proceed.

## 2019-05-14 NOTE — ANESTHESIA PREPROCEDURE EVALUATION
Anesthesia Evaluation     Patient summary reviewed   no history of anesthetic complications:  NPO Solid Status: > 8 hours  NPO Liquid Status: > 2 hours           Airway   Mallampati: II  TM distance: >3 FB  Neck ROM: full  Dental      Pulmonary     breath sounds clear to auscultation  (-) shortness of breath, not a smoker  Cardiovascular   Exercise tolerance: good (4-7 METS)    Rhythm: regular  Rate: normal    (+) hypertension, hyperlipidemia,   (-) angina, GALICIA      Neuro/Psych  (+) psychiatric history Anxiety and Depression,     GI/Hepatic/Renal/Endo    (+) obesity,  GERD, GI bleeding,     Musculoskeletal     Abdominal    Substance History      OB/GYN          Other                        Anesthesia Plan    ASA 2     MAC   (MAC anesthesia discussed with patient and/or patient representative. Risks (including but not limited to intra-op awareness), benefits, and alternatives were discussed. Understanding was voiced with an agreement to proceed with a MAC technique and General as a backup option.   )  Anesthetic plan, all risks, benefits, and alternatives have been provided, discussed and informed consent has been obtained with: patient.

## 2019-05-14 NOTE — ANESTHESIA POSTPROCEDURE EVALUATION
"Patient: Rakel Velarde    Procedure Summary     Date:  05/14/19 Room / Location:  [facility] 8 / [facility]    Anesthesia Start:  0832 Anesthesia Stop:  0914    Procedure:  COLONOSCOPY to cecum and t.i. with bx and polypectomy (N/A ) Diagnosis:       Ulcerative rectosigmoiditis with rectal bleeding (CMS/HCC)      Diarrhea due to malabsorption      (Ulcerative rectosigmoiditis with rectal bleeding (CMS/HCC) [K51.311])      (Diarrhea due to malabsorption [K90.9, R19.7])    Surgeon:  Melanie Carballo MD Provider:  Dontae Mendez MD    Anesthesia Type:  MAC ASA Status:  2          Anesthesia Type: MAC  Last vitals  BP   136/78 (05/14/19 0933)   Temp   36.7 °C (98 °F) (05/14/19 0734)   Pulse   56 (05/14/19 0933)   Resp   16 (05/14/19 0933)     SpO2   99 % (05/14/19 0933)     Post Anesthesia Care and Evaluation    Patient location during evaluation: bedside  Patient participation: complete - patient participated  Level of consciousness: awake and alert  Pain management: adequate  Airway patency: patent  Anesthetic complications: No anesthetic complications    Cardiovascular status: acceptable  Respiratory status: acceptable  Hydration status: acceptable    Comments: /78 (BP Location: Left arm, Patient Position: Lying)   Pulse 56   Temp 36.7 °C (98 °F) (Oral)   Resp 16   Ht 165.1 cm (65\")   Wt 89 kg (196 lb 5 oz)   SpO2 99%   BMI 32.67 kg/m²       "

## 2019-05-15 LAB
CYTO UR: NORMAL
LAB AP CASE REPORT: NORMAL
PATH REPORT.FINAL DX SPEC: NORMAL
PATH REPORT.GROSS SPEC: NORMAL

## 2019-05-16 NOTE — PROGRESS NOTES
Her colonoscopy showed no evidence of any active disease.  The polyp removed was benign lymph tissue.    She can decrease the balsalazide    Imodium up to 3 times a day to control her diarrhea symptoms.  I suspect this is more irritable bowel and not related to her UC.    pls place in 3-year colonoscopy recall.

## 2019-05-22 ENCOUNTER — TELEPHONE (OUTPATIENT)
Dept: GASTROENTEROLOGY | Facility: CLINIC | Age: 59
End: 2019-05-22

## 2019-05-22 NOTE — TELEPHONE ENCOUNTER
Called pt and advised per Dr Carballo that her c/s showed no evidence of any active disease.  The polyp removed was benign lymph tissue.      She can decrease the balsalazide.      Imodium up to 3 times a day to control her diarrhea symptoms.  She suspects this is more irritable bowel and not related to her uc.  She recommends a repeat c/s in 3 yrs. Pt verb understanding.    C/s placed in recall for 05/14/2022.

## 2019-05-22 NOTE — TELEPHONE ENCOUNTER
----- Message from Melanie Carballo MD sent at 5/16/2019  3:09 PM EDT -----  Her colonoscopy showed no evidence of any active disease.  The polyp removed was benign lymph tissue.    She can decrease the balsalazide    Imodium up to 3 times a day to control her diarrhea symptoms.  I suspect this is more irritable bowel and not related to her UC.    pls place in 3-year colonoscopy recall.

## 2019-06-12 ENCOUNTER — TELEPHONE (OUTPATIENT)
Dept: GASTROENTEROLOGY | Facility: CLINIC | Age: 59
End: 2019-06-12

## 2019-06-12 NOTE — TELEPHONE ENCOUNTER
Called pt and advised per Dr Carballo that she should stop the enemas.  Also advised to try otc antifungal cream to help the rash.  If not improving , will send to dermatologist. Pt verb understanding.

## 2019-06-12 NOTE — TELEPHONE ENCOUNTER
----- Message from Frank Richardson sent at 6/12/2019 10:23 AM EDT -----  Regarding: pt called   Contact: [phone]  Pt is calling asking if she can get called in a oral yeast test, pt got one from medication

## 2019-06-12 NOTE — TELEPHONE ENCOUNTER
"Called pt and pt reports that she has had a skin rash on her wrists , abd, groin, under breasts and buttocks.  She states she looked online and it looked like a yeast issue.  She states this started when she took the \"prednisone enemas\".  She reports this is very itchy and and kind of has a blistered looked to it.    She is asking what should do about this. Pt states she was going to call her pcp but she is no longer practicing in her office.  Advised pt will send message .  Pt verb understanding.   "

## 2019-06-12 NOTE — TELEPHONE ENCOUNTER
She should stop the enemas.    Try OTC antifungal cream to help the rash.  If not improving, will send to dermatology.

## 2019-06-17 ENCOUNTER — OFFICE VISIT (OUTPATIENT)
Dept: FAMILY MEDICINE CLINIC | Facility: CLINIC | Age: 59
End: 2019-06-17

## 2019-06-17 VITALS
OXYGEN SATURATION: 96 % | HEIGHT: 65 IN | WEIGHT: 195 LBS | BODY MASS INDEX: 32.49 KG/M2 | RESPIRATION RATE: 16 BRPM | DIASTOLIC BLOOD PRESSURE: 80 MMHG | SYSTOLIC BLOOD PRESSURE: 110 MMHG | TEMPERATURE: 98.4 F | HEART RATE: 70 BPM

## 2019-06-17 DIAGNOSIS — E78.2 MIXED HYPERLIPIDEMIA: ICD-10-CM

## 2019-06-17 DIAGNOSIS — K21.9 GASTRO-ESOPHAGEAL REFLUX DISEASE WITHOUT ESOPHAGITIS: ICD-10-CM

## 2019-06-17 DIAGNOSIS — F41.9 ANXIETY AND DEPRESSION: ICD-10-CM

## 2019-06-17 DIAGNOSIS — I10 ESSENTIAL HYPERTENSION: Primary | ICD-10-CM

## 2019-06-17 DIAGNOSIS — F32.A DEPRESSION, UNSPECIFIED DEPRESSION TYPE: ICD-10-CM

## 2019-06-17 DIAGNOSIS — E55.9 VITAMIN D DEFICIENCY: ICD-10-CM

## 2019-06-17 DIAGNOSIS — L20.84 INTRINSIC ECZEMA: ICD-10-CM

## 2019-06-17 DIAGNOSIS — R06.83 SNORING: ICD-10-CM

## 2019-06-17 DIAGNOSIS — F32.A ANXIETY AND DEPRESSION: ICD-10-CM

## 2019-06-17 DIAGNOSIS — Z00.00 LABORATORY EXAMINATION ORDERED AS PART OF A ROUTINE GENERAL MEDICAL EXAMINATION: ICD-10-CM

## 2019-06-17 DIAGNOSIS — L71.0 PERIORAL DERMATITIS: ICD-10-CM

## 2019-06-17 DIAGNOSIS — E66.9 OBESITY (BMI 30-39.9): ICD-10-CM

## 2019-06-17 PROCEDURE — 99214 OFFICE O/P EST MOD 30 MIN: CPT | Performed by: PHYSICIAN ASSISTANT

## 2019-06-17 RX ORDER — NYSTATIN AND TRIAMCINOLONE ACETONIDE 100000; 1 [USP'U]/G; MG/G
OINTMENT TOPICAL 2 TIMES DAILY
Qty: 60 G | Refills: 1 | Status: SHIPPED | OUTPATIENT
Start: 2019-06-17 | End: 2020-06-16 | Stop reason: SDUPTHER

## 2019-06-17 RX ORDER — CITALOPRAM 20 MG/1
20 TABLET ORAL DAILY
Qty: 90 TABLET | Refills: 3 | Status: SHIPPED | OUTPATIENT
Start: 2019-06-17 | End: 2020-06-16 | Stop reason: SDUPTHER

## 2019-06-17 RX ORDER — FLUTICASONE PROPIONATE 0.05 %
CREAM (GRAM) TOPICAL 2 TIMES DAILY
Qty: 60 G | Refills: 5 | Status: SHIPPED | OUTPATIENT
Start: 2019-06-17 | End: 2022-06-16 | Stop reason: SDUPTHER

## 2019-06-17 RX ORDER — HYDROCHLOROTHIAZIDE 50 MG/1
50 TABLET ORAL DAILY
Qty: 90 TABLET | Refills: 0 | Status: SHIPPED | OUTPATIENT
Start: 2019-06-17 | End: 2019-10-28 | Stop reason: SDUPTHER

## 2019-06-17 NOTE — PROGRESS NOTES
Subjective   Rakel Velarde is a 58 y.o. female.     History of Present Illness   Rakel Velarde 58 y.o. female who presents today for a new patient appointment.    she has a history of   Patient Active Problem List   Diagnosis   • Anxiety and depression   • Gastro-esophageal reflux disease without esophagitis   • Osteopenia   • Obesity (BMI 30-39.9)   • Ulcerative colitis (CMS/HCC)   • Vitamin D deficiency   • Hyperlipidemia   • Hypertension   • HSV infection   • Hallux valgus   • Ulcerative rectosigmoiditis with rectal bleeding (CMS/HCC)   • Diarrhea due to malabsorption   .  she is here to establish care I reviewed the PFSH recorded today by my MA/LPN staff.   she   She has been feeling tired.    Sees DR Gray for GYN  She is Estrace crm also  Dr Carballo is GI;  On Colazal about 20 years;  PPI works  This is her best bp med; ACE cough in past  Watching [facility]  I will also check for anemia  Need her to exercise  Rakel Velarde female 58 y.o. who presents today for follow up of Depression and Anxiety.  She reports med dose is working well.  She started this around menopause.. Onset of symptoms was approximately several years ago.  She denies current suicidal and homicidal ideation. Risk factors are family history of anxiety and or depression and lifestyle of multiple roles.  Previous treatment includes none.  She complains of the following medication side effects: none.  The patient has previously been in counseling..    She is tired and wakes up gasping for air;     Mom had breast cancer  Rash around mouth  Rash on wrists and feet; pink papules itches  The following portions of the patient's history were reviewed and updated as appropriate: allergies, current medications, past family history, past medical history, past social history, past surgical history and problem list.    Review of Systems   Constitutional: Positive for fatigue. Negative for activity change, appetite change and unexpected weight change.   HENT:  Positive for mouth sores. Negative for nosebleeds and trouble swallowing.    Eyes: Negative for pain and visual disturbance.   Respiratory: Negative for chest tightness, shortness of breath and wheezing.    Cardiovascular: Negative for chest pain and palpitations.   Gastrointestinal: Negative for abdominal pain and blood in stool.   Endocrine: Negative.    Genitourinary: Negative for difficulty urinating and hematuria.   Musculoskeletal: Negative for joint swelling.   Skin: Positive for rash. Negative for color change.   Allergic/Immunologic: Negative.    Neurological: Negative for syncope and speech difficulty.   Hematological: Negative for adenopathy.   Psychiatric/Behavioral: Negative for agitation and confusion.   All other systems reviewed and are negative.      Objective   Physical Exam   Constitutional: She is oriented to person, place, and time. She appears well-developed and well-nourished. No distress.   HENT:   Head: Normocephalic and atraumatic.   Eyes: Conjunctivae and EOM are normal. Pupils are equal, round, and reactive to light. Right eye exhibits no discharge. Left eye exhibits no discharge. No scleral icterus.   Neck: Normal range of motion. Neck supple. No tracheal deviation present. No thyromegaly present.   Cardiovascular: Normal rate, regular rhythm, normal heart sounds, intact distal pulses and normal pulses. Exam reveals no gallop.   No murmur heard.  Pulmonary/Chest: Effort normal and breath sounds normal. No respiratory distress. She has no wheezes. She has no rales.   Musculoskeletal: Normal range of motion.   Neurological: She is alert and oriented to person, place, and time. She exhibits normal muscle tone. Coordination normal.   Skin: Skin is warm. Rash noted. No erythema. No pallor.   Mouth corners red and cracked; pink papular grouped on wrists and feet   Psychiatric: She has a normal mood and affect. Her behavior is normal. Judgment and thought content normal.   Nursing note and  vitals reviewed.      Assessment/Plan   There are no diagnoses linked to this encounter.    Refill Celexa--working well  In summary, Rakel Velarde, was seen today.  she was seen for  Hypertenision and is well controlled on medication, Impaired fasting glucose and will continue close lab follow up to watch for DMII, GERD and is well controlled on PPI medication, Hyperlipidemia and working on this with diet and exercise and Vitamin D deficiency and will update labs to confirm level is at goal >30,  90 day on bp so far--get labs  F/u Dr Carballo  Use Cpap/Bipap every night  Get labs  Check for anemia  Mycolog for mouth corners--refer derm if no help  Steroid crm for eczema

## 2019-06-17 NOTE — PATIENT INSTRUCTIONS
Low glycemic index diet  Exercise 30 minutes most days of the week  Make sure you get results on any labs or tests we ordered today  We discussed medications and how to take them as prescribed  Sleep 6-8 hours each night if possible  If you have not signed up for [employer], please activate your code ASAP from your After Visit Summary today    LDL goal <100  LDL goal if heart disease <70  HDL goal >60  Triglyceride goal <150  BP goal =<130/80  Fasting glucose <100

## 2019-06-18 LAB
25(OH)D3+25(OH)D2 SERPL-MCNC: 86 NG/ML (ref 30–100)
ALBUMIN SERPL-MCNC: 4.2 G/DL (ref 3.5–5.2)
ALBUMIN/GLOB SERPL: 1.2 G/DL
ALP SERPL-CCNC: 62 U/L (ref 39–117)
ALT SERPL-CCNC: 10 U/L (ref 1–33)
AST SERPL-CCNC: 12 U/L (ref 1–32)
BASOPHILS # BLD AUTO: 0.06 10*3/MM3 (ref 0–0.2)
BASOPHILS NFR BLD AUTO: 0.7 % (ref 0–1.5)
BILIRUB SERPL-MCNC: 0.3 MG/DL (ref 0.2–1.2)
BUN SERPL-MCNC: 11 MG/DL (ref 6–20)
BUN/CREAT SERPL: 15.3 (ref 7–25)
CALCIUM SERPL-MCNC: 10.3 MG/DL (ref 8.6–10.5)
CHLORIDE SERPL-SCNC: 95 MMOL/L (ref 98–107)
CHOLEST SERPL-MCNC: 208 MG/DL (ref 0–200)
CO2 SERPL-SCNC: 33.3 MMOL/L (ref 22–29)
CREAT SERPL-MCNC: 0.72 MG/DL (ref 0.57–1)
EOSINOPHIL # BLD AUTO: 0.16 10*3/MM3 (ref 0–0.4)
EOSINOPHIL NFR BLD AUTO: 1.8 % (ref 0.3–6.2)
ERYTHROCYTE [DISTWIDTH] IN BLOOD BY AUTOMATED COUNT: 13.1 % (ref 12.3–15.4)
FOLATE SERPL-MCNC: >20 NG/ML (ref 4.78–24.2)
GLOBULIN SER CALC-MCNC: 3.4 GM/DL
GLUCOSE SERPL-MCNC: 111 MG/DL (ref 65–99)
HBA1C MFR BLD: 5.5 % (ref 4.8–5.6)
HCT VFR BLD AUTO: 42.2 % (ref 34–46.6)
HDLC SERPL-MCNC: 53 MG/DL (ref 40–60)
HGB BLD-MCNC: 13.5 G/DL (ref 12–15.9)
IMM GRANULOCYTES # BLD AUTO: 0.02 10*3/MM3 (ref 0–0.05)
IMM GRANULOCYTES NFR BLD AUTO: 0.2 % (ref 0–0.5)
IRON SATN MFR SERPL: 23 % (ref 20–50)
IRON SERPL-MCNC: 87 MCG/DL (ref 37–145)
LDLC SERPL CALC-MCNC: 126 MG/DL (ref 0–100)
LYMPHOCYTES # BLD AUTO: 3.53 10*3/MM3 (ref 0.7–3.1)
LYMPHOCYTES NFR BLD AUTO: 40.5 % (ref 19.6–45.3)
MCH RBC QN AUTO: 30.3 PG (ref 26.6–33)
MCHC RBC AUTO-ENTMCNC: 32 G/DL (ref 31.5–35.7)
MCV RBC AUTO: 94.8 FL (ref 79–97)
MONOCYTES # BLD AUTO: 0.61 10*3/MM3 (ref 0.1–0.9)
MONOCYTES NFR BLD AUTO: 7 % (ref 5–12)
NEUTROPHILS # BLD AUTO: 4.33 10*3/MM3 (ref 1.7–7)
NEUTROPHILS NFR BLD AUTO: 49.8 % (ref 42.7–76)
NRBC BLD AUTO-RTO: 0 /100 WBC (ref 0–0.2)
PLATELET # BLD AUTO: 300 10*3/MM3 (ref 140–450)
POTASSIUM SERPL-SCNC: 3.5 MMOL/L (ref 3.5–5.2)
PROT SERPL-MCNC: 7.6 G/DL (ref 6–8.5)
RBC # BLD AUTO: 4.45 10*6/MM3 (ref 3.77–5.28)
SODIUM SERPL-SCNC: 137 MMOL/L (ref 136–145)
T3FREE SERPL-MCNC: 3 PG/ML (ref 2–4.4)
T4 FREE SERPL-MCNC: 1.07 NG/DL (ref 0.93–1.7)
TIBC SERPL-MCNC: 376 MCG/DL
TRIGL SERPL-MCNC: 143 MG/DL (ref 0–150)
TSH SERPL DL<=0.005 MIU/L-ACNC: 1.4 MIU/ML (ref 0.27–4.2)
UIBC SERPL-MCNC: 289 MCG/DL (ref 112–346)
VIT B12 SERPL-MCNC: 507 PG/ML (ref 211–946)
VLDLC SERPL CALC-MCNC: 28.6 MG/DL
WBC # BLD AUTO: 8.71 10*3/MM3 (ref 3.4–10.8)

## 2019-07-03 ENCOUNTER — APPOINTMENT (OUTPATIENT)
Dept: SLEEP MEDICINE | Facility: HOSPITAL | Age: 59
End: 2019-07-03

## 2019-07-10 ENCOUNTER — APPOINTMENT (OUTPATIENT)
Dept: SLEEP MEDICINE | Facility: HOSPITAL | Age: 59
End: 2019-07-10

## 2019-10-28 DIAGNOSIS — I10 ESSENTIAL HYPERTENSION: ICD-10-CM

## 2019-10-28 RX ORDER — HYDROCHLOROTHIAZIDE 50 MG/1
50 TABLET ORAL DAILY
Qty: 30 TABLET | Refills: 2 | Status: SHIPPED | OUTPATIENT
Start: 2019-10-28 | End: 2019-11-23 | Stop reason: SDUPTHER

## 2019-11-23 DIAGNOSIS — I10 ESSENTIAL HYPERTENSION: ICD-10-CM

## 2019-11-24 RX ORDER — HYDROCHLOROTHIAZIDE 50 MG/1
50 TABLET ORAL DAILY
Qty: 30 TABLET | Refills: 0 | Status: SHIPPED | OUTPATIENT
Start: 2019-11-24 | End: 2019-12-16 | Stop reason: SDUPTHER

## 2019-12-16 ENCOUNTER — OFFICE VISIT (OUTPATIENT)
Dept: FAMILY MEDICINE CLINIC | Facility: CLINIC | Age: 59
End: 2019-12-16

## 2019-12-16 VITALS
DIASTOLIC BLOOD PRESSURE: 74 MMHG | TEMPERATURE: 98.2 F | WEIGHT: 180 LBS | HEIGHT: 65 IN | RESPIRATION RATE: 16 BRPM | SYSTOLIC BLOOD PRESSURE: 110 MMHG | BODY MASS INDEX: 29.99 KG/M2 | HEART RATE: 76 BPM | OXYGEN SATURATION: 98 %

## 2019-12-16 DIAGNOSIS — R73.01 IMPAIRED FASTING GLUCOSE: ICD-10-CM

## 2019-12-16 DIAGNOSIS — I10 ESSENTIAL HYPERTENSION: Primary | ICD-10-CM

## 2019-12-16 PROBLEM — L43.9 LICHEN PLANUS: Status: ACTIVE | Noted: 2019-12-16

## 2019-12-16 PROCEDURE — 99213 OFFICE O/P EST LOW 20 MIN: CPT | Performed by: PHYSICIAN ASSISTANT

## 2019-12-16 RX ORDER — HYDROCHLOROTHIAZIDE 50 MG/1
50 TABLET ORAL DAILY
Qty: 90 TABLET | Refills: 1 | Status: SHIPPED | OUTPATIENT
Start: 2019-12-16 | End: 2020-06-15

## 2019-12-16 NOTE — PATIENT INSTRUCTIONS

## 2019-12-16 NOTE — PROGRESS NOTES
"Carlos Ellington THOMAS Velarde is a 59 y.o. female.     History of Present Illness      Since the last visit, she has overall felt well.  She has Essential Hypertension and well controlled on current medication, Impaired fasting glucose and will monitor labs to watch for DMII, Hyperlipidemia and working on this with diet and exercise and Vitamin D deficiency and labs are at goal >30 ng/mL.  she has been compliant with current medications have reviewed them.  The patient denies medication side effects.  Will refill medications. /74 (BP Location: Left arm, Patient Position: Sitting, Cuff Size: Large Adult)   Pulse 76   Temp 98.2 °F (36.8 °C) (Oral)   Resp 16   Ht 165.1 cm (65\")   Wt 81.6 kg (180 lb)   LMP  (LMP Unknown)   SpO2 98%   BMI 29.95 kg/m²   Watching glucose and will get A1C  Results for orders placed or performed in visit on 06/17/19   Comprehensive metabolic panel   Result Value Ref Range    Glucose 111 (H) 65 - 99 mg/dL    BUN 11 6 - 20 mg/dL    Creatinine 0.72 0.57 - 1.00 mg/dL    eGFR Non African Am 83 >60 mL/min/1.73    eGFR African Am 100 >60 mL/min/1.73    BUN/Creatinine Ratio 15.3 7.0 - 25.0    Sodium 137 136 - 145 mmol/L    Potassium 3.5 3.5 - 5.2 mmol/L    Chloride 95 (L) 98 - 107 mmol/L    Total CO2 33.3 (H) 22.0 - 29.0 mmol/L    Calcium 10.3 8.6 - 10.5 mg/dL    Total Protein 7.6 6.0 - 8.5 g/dL    Albumin 4.20 3.50 - 5.20 g/dL    Globulin 3.4 gm/dL    A/G Ratio 1.2 g/dL    Total Bilirubin 0.3 0.2 - 1.2 mg/dL    Alkaline Phosphatase 62 39 - 117 U/L    AST (SGOT) 12 1 - 32 U/L    ALT (SGPT) 10 1 - 33 U/L   Lipid panel   Result Value Ref Range    Total Cholesterol 208 (H) 0 - 200 mg/dL    Triglycerides 143 0 - 150 mg/dL    HDL Cholesterol 53 40 - 60 mg/dL    VLDL Cholesterol 28.6 mg/dL    LDL Cholesterol  126 (H) 0 - 100 mg/dL   TSH   Result Value Ref Range    TSH 1.400 0.270 - 4.200 mIU/mL   Hemoglobin A1c   Result Value Ref Range    Hemoglobin A1C 5.50 4.80 - 5.60 %   T3, Free   Result " Value Ref Range    T3, Free 3.0 2.0 - 4.4 pg/mL   T4, Free   Result Value Ref Range    Free T4 1.07 0.93 - 1.70 ng/dL   Iron Profile   Result Value Ref Range    TIBC 376 mcg/dL    UIBC 289 112 - 346 mcg/dL    Iron 87 37 - 145 mcg/dL    Iron Saturation 23 20 - 50 %   Vitamin D 25 Hydroxy   Result Value Ref Range    25 Hydroxy, Vitamin D 86.0 30.0 - 100.0 ng/ml   Vitamin B12   Result Value Ref Range    Vitamin B-12 507 211 - 946 pg/mL   Folate   Result Value Ref Range    Folate >20.00 4.78 - 24.20 ng/mL   CBC and Differential   Result Value Ref Range    WBC 8.71 3.40 - 10.80 10*3/mm3    RBC 4.45 3.77 - 5.28 10*6/mm3    Hemoglobin 13.5 12.0 - 15.9 g/dL    Hematocrit 42.2 34.0 - 46.6 %    MCV 94.8 79.0 - 97.0 fL    MCH 30.3 26.6 - 33.0 pg    MCHC 32.0 31.5 - 35.7 g/dL    RDW 13.1 12.3 - 15.4 %    Platelets 300 140 - 450 10*3/mm3    Neutrophil Rel % 49.8 42.7 - 76.0 %    Lymphocyte Rel % 40.5 19.6 - 45.3 %    Monocyte Rel % 7.0 5.0 - 12.0 %    Eosinophil Rel % 1.8 0.3 - 6.2 %    Basophil Rel % 0.7 0.0 - 1.5 %    Neutrophils Absolute 4.33 1.70 - 7.00 10*3/mm3    Lymphocytes Absolute 3.53 (H) 0.70 - 3.10 10*3/mm3    Monocytes Absolute 0.61 0.10 - 0.90 10*3/mm3    Eosinophils Absolute 0.16 0.00 - 0.40 10*3/mm3    Basophils Absolute 0.06 0.00 - 0.20 10*3/mm3    Immature Granulocyte Rel % 0.2 0.0 - 0.5 %    Immature Grans Absolute 0.02 0.00 - 0.05 10*3/mm3    nRBC 0.0 0.0 - 0.2 /100 WBC     Weight is down with diet    I noted last time; ACE cough and this is her best med and watch K+ twice a year with labs  2013 AHA (American Heart Association) Cholesterol Risk Ratio Score Goal is <=7.5% and your score was 3.1%      She did see derm and has lichen planus on wrists; given oral pred to treat  Sees DR Gray for GYN  She is [employer] also  Dr Carballo is GI;  On Colazal about 20 years;  PPI works  This is her best bp med; ACE cough in past  Watching K+  The following portions of the patient's history were reviewed and updated as  appropriate: allergies, current medications, past family history, past medical history, past social history, past surgical history and problem list.    Review of Systems   Constitutional: Negative for activity change, appetite change and unexpected weight change.   HENT: Negative for nosebleeds and trouble swallowing.    Eyes: Negative for pain and visual disturbance.   Respiratory: Negative for chest tightness, shortness of breath and wheezing.    Cardiovascular: Negative for chest pain and palpitations.   Gastrointestinal: Negative for abdominal pain and blood in stool.   Endocrine: Negative.    Genitourinary: Negative for difficulty urinating and hematuria.   Musculoskeletal: Negative for joint swelling.   Skin: Negative for color change and rash.   Allergic/Immunologic: Negative.    Neurological: Negative for syncope and speech difficulty.   Hematological: Negative for adenopathy.   Psychiatric/Behavioral: Negative for agitation and confusion.   All other systems reviewed and are negative.      Objective   Physical Exam   Constitutional: She is oriented to person, place, and time. She appears well-developed and well-nourished. No distress.   HENT:   Head: Normocephalic and atraumatic.   Eyes: Pupils are equal, round, and reactive to light. Conjunctivae and EOM are normal. Right eye exhibits no discharge. Left eye exhibits no discharge. No scleral icterus.   Neck: Normal range of motion. Neck supple. No tracheal deviation present. No thyromegaly present.   Cardiovascular: Normal rate, regular rhythm, normal heart sounds, intact distal pulses and normal pulses. Exam reveals no gallop.   No murmur heard.  Pulmonary/Chest: Effort normal and breath sounds normal. No respiratory distress. She has no wheezes. She has no rales.   Musculoskeletal: Normal range of motion.   Neurological: She is alert and oriented to person, place, and time. She exhibits normal muscle tone. Coordination normal.   Skin: Skin is warm. No  rash noted. No erythema. No pallor.   Psychiatric: She has a normal mood and affect. Her behavior is normal. Judgment and thought content normal.   Nursing note and vitals reviewed.      Assessment/Plan   Rakel was seen today for hypertension.    Diagnoses and all orders for this visit:    Essential hypertension    Impaired fasting glucose

## 2020-01-23 ENCOUNTER — OFFICE VISIT (OUTPATIENT)
Dept: FAMILY MEDICINE CLINIC | Facility: CLINIC | Age: 60
End: 2020-01-23

## 2020-01-23 VITALS
HEIGHT: 65 IN | DIASTOLIC BLOOD PRESSURE: 72 MMHG | BODY MASS INDEX: 30.16 KG/M2 | HEART RATE: 73 BPM | OXYGEN SATURATION: 95 % | RESPIRATION RATE: 16 BRPM | TEMPERATURE: 98.5 F | WEIGHT: 181 LBS | SYSTOLIC BLOOD PRESSURE: 120 MMHG

## 2020-01-23 DIAGNOSIS — H93.13 TINNITUS OF BOTH EARS: Primary | ICD-10-CM

## 2020-01-23 PROCEDURE — 99213 OFFICE O/P EST LOW 20 MIN: CPT | Performed by: PHYSICIAN ASSISTANT

## 2020-01-23 NOTE — PATIENT INSTRUCTIONS
Tinnitus  Tinnitus refers to hearing a sound when there is no actual source for that sound. This is often described as ringing in the ears. However, people with this condition may hear a variety of noises, in one ear or in both ears.  The sounds of tinnitus can be soft, loud, or somewhere in between. Tinnitus can last for a few seconds or can be constant for days. It may go away without treatment and come back at various times. When tinnitus is constant or happens often, it can lead to other problems, such as trouble sleeping and trouble concentrating.  Almost everyone experiences tinnitus at some point. Tinnitus that is long-lasting (chronic) or comes back often (recurs) may require medical attention.  What are the causes?  The cause of tinnitus is often not known. In some cases, it can result from other problems or conditions, including:  · Exposure to loud noises from machinery, music, or other sources.  · Hearing loss.  · Ear or sinus infections.  · Earwax buildup.  · An object (foreign body) stuck in the ear.  · Taking certain medicines.  · Drinking alcohol or caffeine.  · High blood pressure.  · Heart diseases.  · Anemia.  · Allergies.  · Meniere's disease.  · Thyroid problems.  · Tumors.  · A weak, bulging blood vessel (aneurysm) near the ear.  · Depression or other mood disorders.  What are the signs or symptoms?  The main symptom of tinnitus is hearing a sound when there is no source for that sound. It may sound like:  · Buzzing.  · Roaring.  · Ringing.  · Blowing air, like the sound heard when you listen to a seashell.  · Hissing.  · Whistling.  · Sizzling.  · Humming.  · Running water.  · A musical note.  · Tapping.  Symptoms may affect only one ear (unilateral) or both ears (bilateral).  How is this diagnosed?  Tinnitus is diagnosed based on your symptoms, your medical history, and a physical exam. Your health care provider may do a thorough hearing test (audiologic exam) if your tinnitus:  · Is  unilateral.  · Causes hearing difficulties.  · Lasts 6 months or longer.  You may work with a health care provider who specializes in hearing disorders (audiologist). You may be asked questions about your symptoms and how they affect your daily life. You may have other tests done, such as:  · CT scan.  · MRI.  · An imaging test of how blood flows through your blood vessels (angiogram).  How is this treated?  Treating an underlying medical condition can sometimes make tinnitus go away. If your tinnitus continues, other treatments may include:  · Medicines, such as antidepressants or sleeping aids.  · Sound generators to mask the tinnitus. These include:  ? Tabletop sound machines that play relaxing sounds to help you fall asleep.  ? Wearable devices that fit in your ear and play sounds or music.  ? Acoustic neural stimulation. This involves using headphones to listen to music that contains an auditory signal. Over time, listening to this signal may change some pathways in your brain and make you less sensitive to tinnitus. This treatment is used for very severe cases when no other treatment is working.  · Therapy and counseling to help you manage the stress of living with tinnitus.  · Using hearing aids or cochlear implants if your tinnitus is related to hearing loss. Hearing aids are worn in the outer ear. Cochlear implants are surgically placed in the inner ear.  Follow these instructions at home:  Managing symptoms         · When possible, avoid being in loud places and being exposed to loud sounds.  · Wear hearing protection, such as earplugs, when you are exposed to loud noises.  · Use a white noise machine, a humidifier, or other devices to mask the sound of tinnitus.  · Practice techniques for reducing stress, such as meditation, yoga, or deep breathing. Work with your health care provider if you need help with managing stress.  · Sleep with your head slightly raised. This may reduce the impact of  tinnitus.  General instructions  · Do not use stimulants, such as nicotine, alcohol, or caffeine. Talk with your health care provider about other stimulants to avoid. Stimulants are substances that can make you feel alert and attentive by increasing certain activities in the body (such as heart rate and blood pressure). These substances may make tinnitus worse.  · Take over-the-counter and prescription medicines only as told by your health care provider.  · Try to get plenty of sleep each night.  · Keep all follow-up visits as told by your health care provider. This is important.  Contact a health care provider if:  · Your tinnitus continues for 3 weeks or longer without stopping.  · Your symptoms get worse or do not get better with home care.  · You develop tinnitus after a head injury.  · You have tinnitus along with any of the following:  ? Dizziness.  ? Loss of balance.  ? Nausea and vomiting.  Summary  · Tinnitus refers to hearing a sound when there is no actual source for that sound. This is often described as ringing in the ears.  · Symptoms may affect only one ear (unilateral) or both ears (bilateral).  · Use a white noise machine, a humidifier, or other devices to mask the sound of tinnitus.  · Do not use stimulants, such as nicotine, alcohol, or caffeine. Talk with your health care provider about other stimulants to avoid. These substances may make tinnitus worse.  This information is not intended to replace advice given to you by your health care provider. Make sure you discuss any questions you have with your health care provider.  Document Released: 12/18/2006 Document Revised: 09/27/2018 Document Reviewed: 09/27/2018  [employer] Patient Education © 2019 [employer]

## 2020-01-23 NOTE — PROGRESS NOTES
"Subjective   Rakelmara Velarde is a 59 y.o. female.     History of Present Illness   Rakel Velarde 59 y.o. female /72 (BP Location: Left arm, Patient Position: Sitting, Cuff Size: Large Adult)   Pulse 73   Temp 98.5 °F (36.9 °C) (Oral)   Resp 16   Ht 165.1 cm (65\")   Wt 82.1 kg (181 lb)   LMP  (LMP Unknown)   SpO2 95%   BMI 30.12 kg/m²  who presents today for hearing loss; ear flutter; refer ENT. Bilat R>L  she has a history of   Patient Active Problem List   Diagnosis   • Anxiety and depression   • Gastro-esophageal reflux disease without esophagitis   • Osteopenia   • Obesity (BMI 30-39.9)   • Ulcerative colitis (CMS/HCC)   • Vitamin D deficiency   • Hyperlipidemia   • Hypertension   • HSV infection   • Hallux valgus   • Ulcerative rectosigmoiditis with rectal bleeding (CMS/HCC)   • Diarrhea due to malabsorption   • Intrinsic eczema   • Perioral dermatitis   • Lichen planus   .    She does have tinnitus  and the fluttering in both ears is getting worse; I will refer to ENT  Also does have some hearing loss  Also ringing  Will get labs today  The following portions of the patient's history were reviewed and updated as appropriate: allergies, current medications, past family history, past medical history, past social history, past surgical history and problem list.    Review of Systems   Constitutional: Negative for activity change, appetite change and unexpected weight change.   HENT: Positive for tinnitus. Negative for ear pain, nosebleeds and trouble swallowing.    Eyes: Negative for pain and visual disturbance.   Respiratory: Negative for chest tightness, shortness of breath and wheezing.    Cardiovascular: Negative for chest pain and palpitations.   Gastrointestinal: Negative for abdominal pain and blood in stool.   Endocrine: Negative.    Genitourinary: Negative for difficulty urinating and hematuria.   Musculoskeletal: Negative for joint swelling.   Skin: Negative for color change and rash. "   Allergic/Immunologic: Negative.    Neurological: Negative for syncope and speech difficulty.   Hematological: Negative for adenopathy.   Psychiatric/Behavioral: Negative for agitation and confusion.   All other systems reviewed and are negative.      Objective   Physical Exam   Constitutional: She is oriented to person, place, and time. She appears well-developed and well-nourished. No distress.   HENT:   Head: Normocephalic and atraumatic.   Right Ear: External ear normal.   Left Ear: External ear normal.   Mouth/Throat: Oropharynx is clear and moist. No oropharyngeal exudate.   Mild cobblestoning   Eyes: Pupils are equal, round, and reactive to light. Conjunctivae and EOM are normal. Right eye exhibits no discharge. Left eye exhibits no discharge. No scleral icterus.   Neck: Normal range of motion. Neck supple. No tracheal deviation present. No thyromegaly present.   Cardiovascular: Normal rate, regular rhythm, normal heart sounds, intact distal pulses and normal pulses. Exam reveals no gallop.   No murmur heard.  Pulmonary/Chest: Effort normal and breath sounds normal. No respiratory distress. She has no wheezes. She has no rales.   Musculoskeletal: Normal range of motion.   Neurological: She is alert and oriented to person, place, and time. She exhibits normal muscle tone. Coordination normal.   Skin: Skin is warm. No rash noted. No erythema. No pallor.   Psychiatric: She has a normal mood and affect. Her behavior is normal. Judgment and thought content normal.   Nursing note and vitals reviewed.      Assessment/Plan   Problems Addressed this Visit     None      Visit Diagnoses     Tinnitus of both ears    -  Primary        Refer ENT  Get labs

## 2020-01-25 DIAGNOSIS — Z00.00 LABORATORY EXAMINATION ORDERED AS PART OF A ROUTINE GENERAL MEDICAL EXAMINATION: ICD-10-CM

## 2020-01-25 DIAGNOSIS — R73.01 IMPAIRED FASTING GLUCOSE: Primary | ICD-10-CM

## 2020-01-25 DIAGNOSIS — E78.2 MIXED HYPERLIPIDEMIA: ICD-10-CM

## 2020-01-25 DIAGNOSIS — I10 ESSENTIAL HYPERTENSION: ICD-10-CM

## 2020-01-25 DIAGNOSIS — E55.9 VITAMIN D DEFICIENCY: ICD-10-CM

## 2020-01-25 LAB
BUN SERPL-MCNC: 16 MG/DL (ref 8–23)
BUN/CREAT SERPL: 19 (ref 7–25)
CALCIUM SERPL-MCNC: 10.2 MG/DL (ref 8.6–10.5)
CHLORIDE SERPL-SCNC: 94 MMOL/L (ref 98–107)
CO2 SERPL-SCNC: 28.8 MMOL/L (ref 22–29)
CREAT SERPL-MCNC: 0.84 MG/DL (ref 0.57–1)
GLUCOSE SERPL-MCNC: 80 MG/DL (ref 65–99)
HBA1C MFR BLD: 5.8 % (ref 4.8–5.6)
HCV AB S/CO SERPL IA: 0.1 S/CO RATIO (ref 0–0.9)
POTASSIUM SERPL-SCNC: 4 MMOL/L (ref 3.5–5.2)
SODIUM SERPL-SCNC: 136 MMOL/L (ref 136–145)

## 2020-05-06 ENCOUNTER — APPOINTMENT (OUTPATIENT)
Dept: WOMENS IMAGING | Facility: HOSPITAL | Age: 60
End: 2020-05-06

## 2020-05-06 PROCEDURE — 77063 BREAST TOMOSYNTHESIS BI: CPT | Performed by: RADIOLOGY

## 2020-05-06 PROCEDURE — 77067 SCR MAMMO BI INCL CAD: CPT | Performed by: RADIOLOGY

## 2020-06-15 DIAGNOSIS — I10 ESSENTIAL HYPERTENSION: ICD-10-CM

## 2020-06-15 RX ORDER — HYDROCHLOROTHIAZIDE 50 MG/1
TABLET ORAL
Qty: 30 TABLET | Refills: 0 | Status: SHIPPED | OUTPATIENT
Start: 2020-06-15 | End: 2020-06-16 | Stop reason: SDUPTHER

## 2020-06-16 ENCOUNTER — OFFICE VISIT (OUTPATIENT)
Dept: FAMILY MEDICINE CLINIC | Facility: CLINIC | Age: 60
End: 2020-06-16

## 2020-06-16 VITALS
OXYGEN SATURATION: 99 % | SYSTOLIC BLOOD PRESSURE: 122 MMHG | WEIGHT: 184 LBS | RESPIRATION RATE: 16 BRPM | TEMPERATURE: 98 F | HEIGHT: 65 IN | BODY MASS INDEX: 30.66 KG/M2 | DIASTOLIC BLOOD PRESSURE: 80 MMHG | HEART RATE: 67 BPM

## 2020-06-16 DIAGNOSIS — Z00.00 LABORATORY EXAMINATION ORDERED AS PART OF A ROUTINE GENERAL MEDICAL EXAMINATION: ICD-10-CM

## 2020-06-16 DIAGNOSIS — E78.2 MIXED HYPERLIPIDEMIA: ICD-10-CM

## 2020-06-16 DIAGNOSIS — E55.9 VITAMIN D DEFICIENCY: ICD-10-CM

## 2020-06-16 DIAGNOSIS — R73.01 IMPAIRED FASTING GLUCOSE: ICD-10-CM

## 2020-06-16 DIAGNOSIS — F41.9 ANXIETY: ICD-10-CM

## 2020-06-16 DIAGNOSIS — I10 ESSENTIAL HYPERTENSION: Primary | ICD-10-CM

## 2020-06-16 PROCEDURE — 99214 OFFICE O/P EST MOD 30 MIN: CPT | Performed by: PHYSICIAN ASSISTANT

## 2020-06-16 RX ORDER — NYSTATIN AND TRIAMCINOLONE ACETONIDE 100000; 1 [USP'U]/G; MG/G
OINTMENT TOPICAL 2 TIMES DAILY
Qty: 60 G | Refills: 1 | Status: SHIPPED | OUTPATIENT
Start: 2020-06-16 | End: 2020-12-16

## 2020-06-16 RX ORDER — CITALOPRAM 20 MG/1
20 TABLET ORAL DAILY
Qty: 90 TABLET | Refills: 3 | Status: SHIPPED | OUTPATIENT
Start: 2020-06-16 | End: 2021-06-16 | Stop reason: SDUPTHER

## 2020-06-16 RX ORDER — HYDROCHLOROTHIAZIDE 50 MG/1
50 TABLET ORAL DAILY
Qty: 90 TABLET | Refills: 1 | Status: SHIPPED | OUTPATIENT
Start: 2020-06-16 | End: 2020-12-16 | Stop reason: SDUPTHER

## 2020-06-16 NOTE — PROGRESS NOTES
"Subjective   Rakel Velarde is a 59 y.o. female.     History of Present Illness    Since the last visit, she has overall felt well.  She has Essential Hypertension and well controlled on current medication, Impaired fasting glucose and will monitor labs to watch for DMII, GERD controlled on PPI Rx, Hyperlipidemia and working on this with diet and exercise and Vitamin D deficiency and will update labs for continued management.  she has been compliant with current medications have reviewed them.  The patient denies medication side effects.  Will refill medications. /80 (BP Location: Left arm, Patient Position: Sitting, Cuff Size: Adult)   Pulse 67   Temp 98 °F (36.7 °C) (Skin)   Resp 16   Ht 165.1 cm (65\")   Wt 83.5 kg (184 lb)   LMP  (LMP Unknown)   SpO2 99%   BMI 30.62 kg/m²      I noted last time; ACE cough   2013 AHA (American Heart Association) Cholesterol Risk Ratio Score Goal is <=7.5% and your score was 3.1%  Sees DR Gray for GYN  She is [employer] also  Dr Carballo is GI;  On Colazal about 20 years;  PPI works  This is her best bp med; ACE cough in past  Watching K+  I saw her in January and referred to ENT for ear fluttering and tinnitus. Has hearing loss.   Labs ordered for July 25  Rakel Velarde female 59 y.o., /80 (BP Location: Left arm, Patient Position: Sitting, Cuff Size: Adult)   Pulse 67   Temp 98 °F (36.7 °C) (Skin)   Resp 16   Ht 165.1 cm (65\")   Wt 83.5 kg (184 lb)   LMP  (LMP Unknown)   SpO2 99%   BMI 30.62 kg/m²   who presents today for follow up of Anxiety.  She reports medication is working well, patient desires to continue on Rx, and needs refill. Onset of symptoms was approximately several years ago.  She denies current suicidal and homicidal ideation. Risk factors are family history of anxiety and or depression and lifestyle of multiple roles.  Previous treatment includes current Rx.  She complains of the following medication side effects: none.  The patient " declines to go to counseling..    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     Results for orders placed or performed in visit on 12/16/19   Basic Metabolic Panel   Result Value Ref Range    Glucose 80 65 - 99 mg/dL    BUN 16 8 - 23 mg/dL    Creatinine 0.84 0.57 - 1.00 mg/dL    eGFR Non African Am 69 >60 mL/min/1.73    eGFR African Am 84 >60 mL/min/1.73    BUN/Creatinine Ratio 19.0 7.0 - 25.0    Sodium 136 136 - 145 mmol/L    Potassium 4.0 3.5 - 5.2 mmol/L    Chloride 94 (L) 98 - 107 mmol/L    Total CO2 28.8 22.0 - 29.0 mmol/L    Calcium 10.2 8.6 - 10.5 mg/dL   Hemoglobin A1c   Result Value Ref Range    Hemoglobin A1C 5.80 (H) 4.80 - 5.60 %   Hepatitis C Antibody   Result Value Ref Range    Hep C Virus Ab 0.1 0.0 - 0.9 s/co ratio         The following portions of the patient's history were reviewed and updated as appropriate: allergies, current medications, past family history, past medical history, past social history, past surgical history and problem list.    Review of Systems   Constitutional: Negative for activity change, appetite change and unexpected weight change.   HENT: Negative for nosebleeds and trouble swallowing.    Eyes: Negative for pain and visual disturbance.   Respiratory: Negative for chest tightness, shortness of breath and wheezing.    Cardiovascular: Negative for chest pain and palpitations.   Gastrointestinal: Negative for abdominal pain and blood in stool.   Endocrine: Negative.    Genitourinary: Negative for difficulty urinating and hematuria.   Musculoskeletal: Positive for arthralgias and myalgias. Negative for joint swelling.   Skin: Negative for color change and rash.   Allergic/Immunologic: Negative.    Neurological: Negative for syncope and speech difficulty.   Hematological: Negative for adenopathy.   Psychiatric/Behavioral: Negative for agitation and confusion.   All other systems reviewed and are negative.      Objective   Physical Exam    Assessment/Plan   Rakel was seen today  for hypertension.    Diagnoses and all orders for this visit:    Impaired fasting glucose  -     T3, Free  -     T4, Free  -     Folate  -     Vitamin B12  -     Vitamin D 25 Hydroxy  -     Hemoglobin A1c  -     TSH  -     CBC and Differential  -     Lipid panel  -     Comprehensive metabolic panel    Vitamin D deficiency  -     T3, Free  -     T4, Free  -     Folate  -     Vitamin B12  -     Vitamin D 25 Hydroxy  -     Hemoglobin A1c  -     TSH  -     CBC and Differential  -     Lipid panel  -     Comprehensive metabolic panel    Mixed hyperlipidemia  -     T3, Free  -     T4, Free  -     Folate  -     Vitamin B12  -     Vitamin D 25 Hydroxy  -     Hemoglobin A1c  -     TSH  -     CBC and Differential  -     Lipid panel  -     Comprehensive metabolic panel    Laboratory examination ordered as part of a routine general medical examination  -     T3, Free  -     T4, Free  -     Folate  -     Vitamin B12  -     Vitamin D 25 Hydroxy  -     Hemoglobin A1c  -     TSH  -     CBC and Differential  -     Lipid panel  -     Comprehensive metabolic panel    Essential hypertension  -     T3, Free  -     T4, Free  -     Folate  -     Vitamin B12  -     Vitamin D 25 Hydroxy  -     Hemoglobin A1c  -     TSH  -     CBC and Differential  -     Lipid panel  -     Comprehensive metabolic panel  -     hydroCHLOROthiazide (HYDRODIURIL) 50 MG tablet; Take 1 tablet by mouth Daily. for blood pressure    Depression, unspecified depression type  -     citalopram (CeleXA) 20 MG tablet; Take 1 tablet by mouth Daily. For stress    Other orders  -     nystatin-triamcinolone (MYCOLOG) 373443-8.1 UNIT/GM-% ointment; Apply  topically to the appropriate area as directed 2 (Two) Times a Day. To mouth corners    Plan, Rakel Vlearde, was seen today.  she was seen for HTN and continue medication, Imparied fasting glucose and plan follow up labs, diet, and exercise, GERD and will continue on PPI medication, Hyperlipidemia and will work on this with  diet and exercise and Vitamin D deficiency and will update labs .  Cont Celexa for anxiety working well  F/u GI  F/u GYN  Labs; must check K+  Cont Mycolog---works well

## 2020-12-10 ENCOUNTER — TELEPHONE (OUTPATIENT)
Dept: FAMILY MEDICINE CLINIC | Facility: CLINIC | Age: 60
End: 2020-12-10

## 2020-12-11 LAB
25(OH)D3+25(OH)D2 SERPL-MCNC: 53.8 NG/ML (ref 30–100)
ALBUMIN SERPL-MCNC: 4.3 G/DL (ref 3.8–4.8)
ALBUMIN/GLOB SERPL: 1.4 {RATIO} (ref 1.2–2.2)
ALP SERPL-CCNC: 68 IU/L (ref 39–117)
ALT SERPL-CCNC: 13 IU/L (ref 0–32)
AST SERPL-CCNC: 17 IU/L (ref 0–40)
BASOPHILS # BLD AUTO: 0.1 X10E3/UL (ref 0–0.2)
BASOPHILS NFR BLD AUTO: 1 %
BILIRUB SERPL-MCNC: 0.4 MG/DL (ref 0–1.2)
BUN SERPL-MCNC: 13 MG/DL (ref 8–27)
BUN/CREAT SERPL: 17 (ref 12–28)
CALCIUM SERPL-MCNC: 9.9 MG/DL (ref 8.7–10.3)
CHLORIDE SERPL-SCNC: 97 MMOL/L (ref 96–106)
CHOLEST SERPL-MCNC: 222 MG/DL (ref 100–199)
CO2 SERPL-SCNC: 21 MMOL/L (ref 20–29)
CREAT SERPL-MCNC: 0.76 MG/DL (ref 0.57–1)
EOSINOPHIL # BLD AUTO: 0.1 X10E3/UL (ref 0–0.4)
EOSINOPHIL NFR BLD AUTO: 2 %
ERYTHROCYTE [DISTWIDTH] IN BLOOD BY AUTOMATED COUNT: 13 % (ref 11.7–15.4)
GLOBULIN SER CALC-MCNC: 3.1 G/DL (ref 1.5–4.5)
GLUCOSE SERPL-MCNC: 88 MG/DL (ref 65–99)
HBA1C MFR BLD: 5.4 % (ref 4.8–5.6)
HCT VFR BLD AUTO: 40.3 % (ref 34–46.6)
HDLC SERPL-MCNC: 51 MG/DL
HGB BLD-MCNC: 13.9 G/DL (ref 11.1–15.9)
IMM GRANULOCYTES # BLD AUTO: 0 X10E3/UL (ref 0–0.1)
IMM GRANULOCYTES NFR BLD AUTO: 0 %
LDLC SERPL CALC-MCNC: 134 MG/DL (ref 0–99)
LYMPHOCYTES # BLD AUTO: 2.7 X10E3/UL (ref 0.7–3.1)
LYMPHOCYTES NFR BLD AUTO: 37 %
MCH RBC QN AUTO: 31.7 PG (ref 26.6–33)
MCHC RBC AUTO-ENTMCNC: 34.5 G/DL (ref 31.5–35.7)
MCV RBC AUTO: 92 FL (ref 79–97)
MONOCYTES # BLD AUTO: 0.4 X10E3/UL (ref 0.1–0.9)
MONOCYTES NFR BLD AUTO: 6 %
NEUTROPHILS # BLD AUTO: 3.9 X10E3/UL (ref 1.4–7)
NEUTROPHILS NFR BLD AUTO: 54 %
PLATELET # BLD AUTO: 267 X10E3/UL (ref 150–450)
POTASSIUM SERPL-SCNC: 3.7 MMOL/L (ref 3.5–5.2)
PROT SERPL-MCNC: 7.4 G/DL (ref 6–8.5)
RBC # BLD AUTO: 4.39 X10E6/UL (ref 3.77–5.28)
SODIUM SERPL-SCNC: 136 MMOL/L (ref 134–144)
T3FREE SERPL-MCNC: 3.1 PG/ML (ref 2–4.4)
T4 FREE SERPL-MCNC: 1.1 NG/DL (ref 0.82–1.77)
TRIGL SERPL-MCNC: 207 MG/DL (ref 0–149)
TSH SERPL DL<=0.005 MIU/L-ACNC: 1.77 UIU/ML (ref 0.45–4.5)
VIT B12 SERPL-MCNC: 433 PG/ML (ref 232–1245)
VLDLC SERPL CALC-MCNC: 37 MG/DL (ref 5–40)
WBC # BLD AUTO: 7.2 X10E3/UL (ref 3.4–10.8)

## 2020-12-16 ENCOUNTER — OFFICE VISIT (OUTPATIENT)
Dept: FAMILY MEDICINE CLINIC | Facility: CLINIC | Age: 60
End: 2020-12-16

## 2020-12-16 VITALS
DIASTOLIC BLOOD PRESSURE: 80 MMHG | TEMPERATURE: 97.1 F | HEIGHT: 65 IN | WEIGHT: 193 LBS | SYSTOLIC BLOOD PRESSURE: 130 MMHG | HEART RATE: 76 BPM | OXYGEN SATURATION: 96 % | RESPIRATION RATE: 16 BRPM | BODY MASS INDEX: 32.15 KG/M2

## 2020-12-16 DIAGNOSIS — F41.1 GENERALIZED ANXIETY DISORDER: ICD-10-CM

## 2020-12-16 DIAGNOSIS — R73.01 IMPAIRED FASTING GLUCOSE: ICD-10-CM

## 2020-12-16 DIAGNOSIS — E55.9 VITAMIN D DEFICIENCY: ICD-10-CM

## 2020-12-16 DIAGNOSIS — I10 ESSENTIAL HYPERTENSION: Primary | ICD-10-CM

## 2020-12-16 DIAGNOSIS — E78.2 MIXED HYPERLIPIDEMIA: ICD-10-CM

## 2020-12-16 PROCEDURE — 99213 OFFICE O/P EST LOW 20 MIN: CPT | Performed by: PHYSICIAN ASSISTANT

## 2020-12-16 RX ORDER — HYDROCHLOROTHIAZIDE 50 MG/1
50 TABLET ORAL DAILY
Qty: 90 TABLET | Refills: 1 | Status: SHIPPED | OUTPATIENT
Start: 2020-12-16 | End: 2021-06-13

## 2020-12-16 NOTE — PROGRESS NOTES
"Carlos Pittmara Velarde is a 60 y.o. female.     History of Present Illness    Since the last visit, she has overall felt well.  She has Essential Hypertension and well controlled on current medication, Impaired fasting glucose and will monitor labs to watch for DMII, GERD controlled on PPI Rx, Hyperlipidemia and working on this with diet and exercise and Vitamin D deficiency and labs are at goal >30 ng/mL.  she has been compliant with current medications have reviewed them.  The patient denies medication side effects.  Will refill medications. /80   Pulse 76   Temp 97.1 °F (36.2 °C) (Skin)   Resp 16   Ht 165.1 cm (65\")   Wt 87.5 kg (193 lb)   LMP  (LMP Unknown)   SpO2 96%   BMI 32.12 kg/m²    Chol score now 5.38%  Patient has been erroneously marked as diabetic. Based on the available clinical information, she does not have diabetes and should therefore be excluded from diabetic health maintenance and quality measures for the remainder of the reporting period.    Results for orders placed or performed in visit on 06/16/20   Comprehensive metabolic panel    Specimen: Blood   Result Value Ref Range    Glucose 88 65 - 99 mg/dL    BUN 13 8 - 27 mg/dL    Creatinine 0.76 0.57 - 1.00 mg/dL    eGFR Non African Am 85 >59 mL/min/1.73    eGFR African Am 98 >59 mL/min/1.73    BUN/Creatinine Ratio 17 12 - 28    Sodium 136 134 - 144 mmol/L    Potassium 3.7 3.5 - 5.2 mmol/L    Chloride 97 96 - 106 mmol/L    Total CO2 21 20 - 29 mmol/L    Calcium 9.9 8.7 - 10.3 mg/dL    Total Protein 7.4 6.0 - 8.5 g/dL    Albumin 4.3 3.8 - 4.8 g/dL    Globulin 3.1 1.5 - 4.5 g/dL    A/G Ratio 1.4 1.2 - 2.2    Total Bilirubin 0.4 0.0 - 1.2 mg/dL    Alkaline Phosphatase 68 39 - 117 IU/L    AST (SGOT) 17 0 - 40 IU/L    ALT (SGPT) 13 0 - 32 IU/L   Lipid panel    Specimen: Blood   Result Value Ref Range    Total Cholesterol 222 (H) 100 - 199 mg/dL    Triglycerides 207 (H) 0 - 149 mg/dL    HDL Cholesterol 51 >39 mg/dL    VLDL " Cholesterol Evgeny 37 5 - 40 mg/dL    LDL Chol Calc ([facility]) 134 (H) 0 - 99 mg/dL   TSH    Specimen: Blood   Result Value Ref Range    TSH 1.770 0.450 - 4.500 uIU/mL   Hemoglobin A1c    Specimen: Blood   Result Value Ref Range    Hemoglobin A1C 5.4 4.8 - 5.6 %   T3, Free    Specimen: Blood   Result Value Ref Range    T3, Free 3.1 2.0 - 4.4 pg/mL   T4, Free    Specimen: Blood   Result Value Ref Range    Free T4 1.10 0.82 - 1.77 ng/dL   Vitamin B12    Specimen: Blood   Result Value Ref Range    Vitamin B-12 433 232 - 1,245 pg/mL   Vitamin D 25 Hydroxy    Specimen: Blood   Result Value Ref Range    25 Hydroxy, Vitamin D 53.8 30.0 - 100.0 ng/mL   CBC and Differential    Specimen: Blood   Result Value Ref Range    WBC 7.2 3.4 - 10.8 x10E3/uL    RBC 4.39 3.77 - 5.28 x10E6/uL    Hemoglobin 13.9 11.1 - 15.9 g/dL    Hematocrit 40.3 34.0 - 46.6 %    MCV 92 79 - 97 fL    MCH 31.7 26.6 - 33.0 pg    MCHC 34.5 31.5 - 35.7 g/dL    RDW 13.0 11.7 - 15.4 %    Platelets 267 150 - 450 x10E3/uL    Neutrophil Rel % 54 Not Estab. %    Lymphocyte Rel % 37 Not Estab. %    Monocyte Rel % 6 Not Estab. %    Eosinophil Rel % 2 Not Estab. %    Basophil Rel % 1 Not Estab. %    Neutrophils Absolute 3.9 1.4 - 7.0 x10E3/uL    Lymphocytes Absolute 2.7 0.7 - 3.1 x10E3/uL    Monocytes Absolute 0.4 0.1 - 0.9 x10E3/uL    Eosinophils Absolute 0.1 0.0 - 0.4 x10E3/uL    Basophils Absolute 0.1 0.0 - 0.2 x10E3/uL    Immature Granulocyte Rel % 0 Not Estab. %    Immature Grans Absolute 0.0 0.0 - 0.1 x10E3/uL           Cont Celexa for anxiety working well  2013 AHA (American Heart Association) Cholesterol Risk Ratio Score Goal is <=7.5% and your score was 3.1%  Sees DR Gray for GYN  She is [facility] c[facility] also  Dr Haris is GI;  On Colazal about 20 years;  PPI works  This is her best bp med; ACE cough in past  Watching [facility]  HRT with GYN  ENT for ear fluttering and tinnitus. Has hearing loss.   The following portions of the patient's history were reviewed and updated as  appropriate: allergies, current medications, past family history, past medical history, past social history, past surgical history and problem list.    Review of Systems   Constitutional: Negative for activity change, appetite change, fatigue and unexpected weight change.   HENT: Negative for nosebleeds and trouble swallowing.    Eyes: Negative for pain and visual disturbance.   Respiratory: Negative for chest tightness, shortness of breath and wheezing.    Cardiovascular: Negative for chest pain and palpitations.   Gastrointestinal: Negative for abdominal pain and blood in stool.   Endocrine: Negative.    Genitourinary: Negative for difficulty urinating and hematuria.   Musculoskeletal: Negative for joint swelling.   Skin: Negative for color change and rash.   Allergic/Immunologic: Negative.    Neurological: Negative for syncope and speech difficulty.   Hematological: Negative for adenopathy.   Psychiatric/Behavioral: Negative for agitation, confusion, dysphoric mood and sleep disturbance. The patient is not nervous/anxious.    All other systems reviewed and are negative.      Objective   Physical Exam  Vitals signs and nursing note reviewed.   Constitutional:       General: She is not in acute distress.     Appearance: She is well-developed. She is not ill-appearing or toxic-appearing.   HENT:      Head: Normocephalic.      Right Ear: External ear normal.      Left Ear: External ear normal.      Nose: Nose normal.      Mouth/Throat:      Pharynx: Oropharynx is clear.   Eyes:      General: No scleral icterus.     Conjunctiva/sclera: Conjunctivae normal.      Pupils: Pupils are equal, round, and reactive to light.   Neck:      Musculoskeletal: Normal range of motion and neck supple.      Thyroid: No thyromegaly.   Cardiovascular:      Rate and Rhythm: Normal rate and regular rhythm.      Heart sounds: Normal heart sounds. No murmur.   Pulmonary:      Effort: Pulmonary effort is normal. No respiratory distress.       Breath sounds: Normal breath sounds.   Musculoskeletal: Normal range of motion.         General: No deformity.      Right lower leg: No edema.      Left lower leg: No edema.   Skin:     General: Skin is warm and dry.      Coloration: Skin is not jaundiced.      Findings: No rash.   Neurological:      General: No focal deficit present.      Mental Status: She is alert and oriented to person, place, and time. Mental status is at baseline.   Psychiatric:         Mood and Affect: Mood normal.         Behavior: Behavior normal.         Thought Content: Thought content normal.         Judgment: Judgment normal.         Assessment/Plan   Diagnoses and all orders for this visit:    1. Essential hypertension (Primary)    2. Generalized anxiety disorder    3. Vitamin D deficiency    4. Mixed hyperlipidemia    5. Impaired fasting glucose      Cont Celexa for anxiety  Watching [facility]  Plan, Rakel Velarde, was seen today.  she was seen for HTN and continue medication, Imparied fasting glucose and plan follow up labs, diet, and exercise, GERD and will continue on PPI medication, Hyperlipidemia and will work on this with diet and exercise and Vitamin D deficiency and supplemented.  Start exercise  Sees GYN---HRT

## 2021-03-09 ENCOUNTER — OFFICE VISIT (OUTPATIENT)
Dept: GASTROENTEROLOGY | Facility: CLINIC | Age: 61
End: 2021-03-09

## 2021-03-09 VITALS — BODY MASS INDEX: 32.12 KG/M2 | HEIGHT: 65 IN | WEIGHT: 192.8 LBS | TEMPERATURE: 97.3 F

## 2021-03-09 DIAGNOSIS — R19.7 DIARRHEA, UNSPECIFIED TYPE: ICD-10-CM

## 2021-03-09 DIAGNOSIS — K51.30 ULCERATIVE RECTOSIGMOIDITIS WITHOUT COMPLICATION (HCC): Primary | ICD-10-CM

## 2021-03-09 LAB
BASOPHILS # BLD AUTO: 0.05 10*3/MM3 (ref 0–0.2)
BASOPHILS NFR BLD AUTO: 0.7 % (ref 0–1.5)
EOSINOPHIL # BLD AUTO: 0.13 10*3/MM3 (ref 0–0.4)
EOSINOPHIL NFR BLD AUTO: 1.9 % (ref 0.3–6.2)
ERYTHROCYTE [DISTWIDTH] IN BLOOD BY AUTOMATED COUNT: 12.8 % (ref 12.3–15.4)
ERYTHROCYTE [SEDIMENTATION RATE] IN BLOOD BY WESTERGREN METHOD: 19 MM/HR (ref 0–30)
HCT VFR BLD AUTO: 40.3 % (ref 34–46.6)
HGB BLD-MCNC: 13.9 G/DL (ref 12–15.9)
IMM GRANULOCYTES # BLD AUTO: 0.03 10*3/MM3 (ref 0–0.05)
IMM GRANULOCYTES NFR BLD AUTO: 0.4 % (ref 0–0.5)
LYMPHOCYTES # BLD AUTO: 2.86 10*3/MM3 (ref 0.7–3.1)
LYMPHOCYTES NFR BLD AUTO: 41.5 % (ref 19.6–45.3)
MCH RBC QN AUTO: 31.7 PG (ref 26.6–33)
MCHC RBC AUTO-ENTMCNC: 34.5 G/DL (ref 31.5–35.7)
MCV RBC AUTO: 91.8 FL (ref 79–97)
MONOCYTES # BLD AUTO: 0.51 10*3/MM3 (ref 0.1–0.9)
MONOCYTES NFR BLD AUTO: 7.4 % (ref 5–12)
NEUTROPHILS # BLD AUTO: 3.31 10*3/MM3 (ref 1.7–7)
NEUTROPHILS NFR BLD AUTO: 48.1 % (ref 42.7–76)
NRBC BLD AUTO-RTO: 0 /100 WBC (ref 0–0.2)
PLATELET # BLD AUTO: 286 10*3/MM3 (ref 140–450)
RBC # BLD AUTO: 4.39 10*6/MM3 (ref 3.77–5.28)
WBC # BLD AUTO: 6.89 10*3/MM3 (ref 3.4–10.8)

## 2021-03-09 PROCEDURE — 99214 OFFICE O/P EST MOD 30 MIN: CPT | Performed by: NURSE PRACTITIONER

## 2021-03-09 NOTE — PROGRESS NOTES
Chief Complaint   Patient presents with   • Ulcerative rectosigmoiditis     HPI    Rakel THOMAS Velarde is a  60 y.o. female here for a follow up visit for ulcerative rectosigmoiditis and GERD.  She follows with Dr. Carballo, new to me.  She has been maintained on balsalazide.  Colonoscopy in 2019 with hemorrhoids, normal ileum, and polyp.  Colonoscopy showed no evidence of active disease.  She was instructed to use Imodium in combination of balsalazide for IBS-like symptoms.  Recall colonoscopy 3 years.    On visit today she reports some increase in diarrhea symptoms occurring approximately 2 days out of the week with lower abdominal cramps but no rectal bleeding or abdominal pain.  In between she will pass formed stools.  She has not tried Imodium.  She is compliant with balsalazide regimen is a refill.    She reports adequate control GERD symptoms on OTC PPI therapy.  She denies nausea, vomiting, dysphagia, or odynophagia.    Past Medical History:   Diagnosis Date   • Angular cheilitis 2016   • Anxiety    • GERD (gastroesophageal reflux disease)    • Hallux valgus     followed by Ortho, Dr. Humphreys   • HSV infection     followed by OB/GYN, Dr. Maria Antonia Gray   • Hyperlipidemia    • Hypertension    • Obesity    • Osteopenia    • Ulcerative colitis (CMS/HCC)     followed by Dr. Carballo, Q2 yr colonoscopies   • Vitamin D deficiency        Past Surgical History:   Procedure Laterality Date   •  SECTION     • COLONOSCOPY  2011    Dr Dennison in 3-5 years   • COLONOSCOPY N/A 2019    Procedure: COLONOSCOPY to cecum and t.i. with bx and polypectomy;  Surgeon: Melanie Cabrallo MD;  Location: [facility] ENDOSCOPY;  Service: Gastroenterology   • HYSTEROSCOPY W/ ENDOMETRIAL ABLATION     • MANDIBLE FRACTURE SURGERY     • TONSILLECTOMY     • TUBAL ABDOMINAL LIGATION     • WISDOM TOOTH EXTRACTION         Scheduled Meds:  Outpatient Encounter Medications as of 3/9/2021   Medication Sig Dispense Refill   • balsalazide  (COLAZAL) 750 MG capsule Take 2,250 mg by mouth Daily.     • Calcium-Magnesium-Vitamin D (CALCIUM 500 PO) Take by mouth.     • Cholecalciferol (VITAMIN D3) 2000 UNITS tablet Take 2 tablets by mouth daily.     • citalopram (CeleXA) 20 MG tablet Take 1 tablet by mouth Daily. For stress 90 tablet 3   • esomeprazole (nexIUM) 20 MG capsule Take 20 mg by mouth Every Morning Before Breakfast.     • estradiol (ESTRACE) 0.5 MG tablet      • hydroCHLOROthiazide (HYDRODIURIL) 50 MG tablet Take 1 tablet by mouth Daily. for blood pressure 90 tablet 1   • progesterone (PROMETRIUM) 100 MG capsule Take 100 mg by mouth Daily.     • valACYclovir (VALTREX) 500 MG tablet Take 500 mg by mouth 2 (Two) Times a Day.     • fluticasone (CUTIVATE) 0.05 % cream Apply  topically to the appropriate area as directed 2 (Two) Times a Day. To eczema PRN 60 g 5     No facility-administered encounter medications on file as of 3/9/2021.       Continuous Infusions:No current facility-administered medications for this visit.      PRN Meds:.    Allergies   Allergen Reactions   • Lisinopril Cough       Social History     Socioeconomic History   • Marital status:      Spouse name: Not on file   • Number of children: Not on file   • Years of education: Not on file   • Highest education level: Not on file   Tobacco Use   • Smoking status: Never Smoker   • Smokeless tobacco: Never Used   Substance and Sexual Activity   • Alcohol use: Yes     Alcohol/week: 3.0 standard drinks     Types: 3 Glasses of wine per week     Comment: socially   • Drug use: No   • Sexual activity: Defer       Family History   Problem Relation Age of Onset   • Breast cancer Mother 50   • Emphysema Mother    • Macular degeneration Mother    • Hypertension Mother    • Pancreatitis Mother    • Hypertension Father    • Dementia Father    • Parkinsonism Father    • Depression Brother    • Esophageal cancer Brother    • Hypertension Brother    • Melanoma Brother    • Lymphoma Maternal  Grandmother    • Macular degeneration Maternal Grandmother    • Macular degeneration Maternal Aunt    • Macular degeneration Maternal Uncle    • Malig Hyperthermia Neg Hx        Review of Systems   Constitutional: Negative for activity change, appetite change, fatigue, fever and unexpected weight change.   HENT: Negative for trouble swallowing.    Respiratory: Negative for apnea, cough, choking, chest tightness, shortness of breath and wheezing.    Cardiovascular: Negative for chest pain, palpitations and leg swelling.   Gastrointestinal: Positive for diarrhea. Negative for abdominal distention, abdominal pain, anal bleeding, blood in stool, constipation, nausea, rectal pain and vomiting.       Vitals:    03/09/21 1030   Temp: 97.3 °F (36.3 °C)       Physical Exam  Constitutional:       Appearance: She is well-developed.   Cardiovascular:      Rate and Rhythm: Normal rate and regular rhythm.      Heart sounds: Normal heart sounds.   Pulmonary:      Effort: Pulmonary effort is normal. No respiratory distress.      Breath sounds: Normal breath sounds. No wheezing.   Abdominal:      General: Bowel sounds are normal. There is no distension.      Palpations: Abdomen is soft. There is no mass.      Tenderness: There is no abdominal tenderness. There is no guarding.      Hernia: No hernia is present.   Neurological:      Mental Status: She is alert and oriented to person, place, and time.   Psychiatric:         Behavior: Behavior normal.     Assessment    Ulcerative rectosigmoiditis  Episodic diarrhea  Abdominal cramps  GERD    Plan    Pleasant 60-year-old female seen today in follow-up for ulcerative rectosigmoiditis maintained on balsalazide.  She has episodic diarrhea with lower abdominal cramps over the past several months.  At this point recommend labs today to include CBC, CMP, CRP, and sed rate.  Complete fecal calprotectin to assess for colonic inflammation.  We need to determine if this is a flareup of colitis  versus IBS.  Given the infrequency of diarrhea likely irritable bowel syndrome.  Continue current dosage of balsalazide.  I will speak with Dr. Rubin as well regarding her current dosage of balsalazide.  We may need to consider increasing current dosage or consider transitioning her to mesalamine based product such as Apriso if insurance will cover this.    Stable GERD, continue PPI therapy.    Further recommendations and follow-up pending labs and stool testing.

## 2021-03-10 LAB
ALBUMIN SERPL-MCNC: 4.2 G/DL (ref 3.5–5.2)
ALBUMIN/GLOB SERPL: 1.4 G/DL
ALP SERPL-CCNC: 63 U/L (ref 39–117)
ALT SERPL-CCNC: 18 U/L (ref 1–33)
AST SERPL-CCNC: 20 U/L (ref 1–32)
BILIRUB SERPL-MCNC: 0.3 MG/DL (ref 0–1.2)
BUN SERPL-MCNC: 16 MG/DL (ref 8–23)
BUN/CREAT SERPL: 21.6 (ref 7–25)
CALCIUM SERPL-MCNC: 10.2 MG/DL (ref 8.6–10.5)
CHLORIDE SERPL-SCNC: 98 MMOL/L (ref 98–107)
CO2 SERPL-SCNC: 30.6 MMOL/L (ref 22–29)
CREAT SERPL-MCNC: 0.74 MG/DL (ref 0.57–1)
CRP SERPL-MCNC: <0.3 MG/DL (ref 0–0.5)
GLOBULIN SER CALC-MCNC: 2.9 GM/DL
GLUCOSE SERPL-MCNC: 95 MG/DL (ref 65–99)
POTASSIUM SERPL-SCNC: 3.9 MMOL/L (ref 3.5–5.2)
PROT SERPL-MCNC: 7.1 G/DL (ref 6–8.5)
SODIUM SERPL-SCNC: 139 MMOL/L (ref 136–145)

## 2021-03-11 NOTE — PROGRESS NOTES
Please inform the patient that her lab work looks normal.  Lets have her follow-up with Gladys in 8 weeks.  Please complete stool testing. (Haris patient)

## 2021-03-15 LAB
ADV 40+41 DNA STL QL NAA+NON-PROBE: NOT DETECTED
ASTRO TYP 1-8 RNA STL QL NAA+NON-PROBE: NOT DETECTED
C CAYETANENSIS DNA STL QL NAA+NON-PROBE: NOT DETECTED
C COLI+JEJ+UPSA DNA STL QL NAA+NON-PROBE: NOT DETECTED
C DIF TOX TCDA+TCDB STL QL NAA+NON-PROBE: NOT DETECTED
CRYPTOSP DNA STL QL NAA+NON-PROBE: NOT DETECTED
E COLI O157 DNA STL QL NAA+NON-PROBE: NORMAL
E HISTOLYT DNA STL QL NAA+NON-PROBE: NOT DETECTED
EAEC PAA PLAS AGGR+AATA ST NAA+NON-PRB: NOT DETECTED
EC STX1+STX2 GENES STL QL NAA+NON-PROBE: NOT DETECTED
EPEC EAE GENE STL QL NAA+NON-PROBE: NOT DETECTED
ETEC LTA+ST1A+ST1B TOX ST NAA+NON-PROBE: NOT DETECTED
G LAMBLIA DNA STL QL NAA+NON-PROBE: NOT DETECTED
NOROVIRUS GI+II RNA STL QL NAA+NON-PROBE: NOT DETECTED
P SHIGELLOIDES DNA STL QL NAA+NON-PROBE: NOT DETECTED
RVA RNA STL QL NAA+NON-PROBE: NOT DETECTED
S ENT+BONG DNA STL QL NAA+NON-PROBE: NOT DETECTED
SAPO I+II+IV+V RNA STL QL NAA+NON-PROBE: NOT DETECTED
SHIGELLA SP+EIEC IPAH ST NAA+NON-PROBE: NOT DETECTED
V CHOL+PARA+VUL DNA STL QL NAA+NON-PROBE: NOT DETECTED
V CHOLERAE DNA STL QL NAA+NON-PROBE: NOT DETECTED
Y ENTEROCOL DNA STL QL NAA+NON-PROBE: NOT DETECTED

## 2021-03-16 ENCOUNTER — TELEPHONE (OUTPATIENT)
Dept: GASTROENTEROLOGY | Facility: CLINIC | Age: 61
End: 2021-03-16

## 2021-03-16 RX ORDER — MESALAMINE 1.2 G/1
4800 TABLET, DELAYED RELEASE ORAL
Qty: 120 TABLET | Refills: 6 | Status: SHIPPED | OUTPATIENT
Start: 2021-03-16 | End: 2021-08-16 | Stop reason: SDUPTHER

## 2021-03-16 NOTE — TELEPHONE ENCOUNTER
I sent Lialda 4 tablets once daily taken in the morning to her pharmacy.  Looks like it is preferred on her insurance.  Make sure she has a follow-up with MT in 6 weeks. Per Theresa NP    Called pt and left vm for pt to call back.

## 2021-03-16 NOTE — TELEPHONE ENCOUNTER
----- Message from RISA Raya sent at 3/12/2021  4:32 PM EST -----  Scottie patient:    Please inform the patient I updated Dr. MACKAY about her current symptoms and medication regimen.  We still need to see what her stool test show but what does she think about transitioning to a newer generation medication such as Apriso or Lialda?  Let me know if she is interested in we could see what insurance coverage is.  Thanks BG  ----- Message -----  From: Melanie Carballo MD  Sent: 3/9/2021   4:20 PM EST  To: RISA Raya    On review of my notes, this was the dosing that she was on with Dr. Garzon which had been controlling her disease.  Her 2019 colonoscopy showed no active disease.  Maintenance dose is 1.5 g twice daily, so she could be increased to that to see how she does following results of stool testing.  It would also be good to see if her insurance would cover mesalamine-based medications rather than this.  We may not be so ariana though.  It also does not look that I have been refilling her medication so I wonder where she has been getting the medication, possibly from her PCP?  ----- Message -----  From: Liss Jean APRN  Sent: 3/9/2021  11:04 AM EST  To: Melanie Carballo MD    I saw this nice lady in follow-up.  I just want to clarify her balsalazide dose.  She is currently taking 3 tablets once daily of 750 mg.  Having some diarrhea about 2 days out of the week with fecal urgency.  I am checking labs and getting stool testing.  I just wanted to make sure that you are okay with this dosage or do you prefer 3 tablets 3 times a day?  Thanks Liss

## 2021-03-16 NOTE — TELEPHONE ENCOUNTER
----- Message from RISA Raya sent at 3/15/2021  4:16 PM EDT -----  GI PCR negative for infectious pathogens.  Still waiting on fecal calprotectin.

## 2021-03-16 NOTE — TELEPHONE ENCOUNTER
Call to pt.  Advise per MYKEL Jean note.  Verb understanding.     F/u appt scheduled with JAMIE Ocampo for 4/27 @ 11am.

## 2021-03-16 NOTE — TELEPHONE ENCOUNTER
Called pt and advised of Liss's note.  Pt verb understanding and would like to try new medication.   Update sent to Liss NP.

## 2021-03-17 LAB — CALPROTECTIN STL-MCNT: 26 UG/G (ref 0–120)

## 2021-03-17 NOTE — PROGRESS NOTES
Fecal calprotectin normal which is good news.  Lets see how she does with medication changes and keep follow-up with JAY Garcia

## 2021-03-19 ENCOUNTER — BULK ORDERING (OUTPATIENT)
Dept: CASE MANAGEMENT | Facility: OTHER | Age: 61
End: 2021-03-19

## 2021-03-19 DIAGNOSIS — Z23 IMMUNIZATION DUE: ICD-10-CM

## 2021-03-25 ENCOUNTER — TELEPHONE (OUTPATIENT)
Dept: GASTROENTEROLOGY | Facility: CLINIC | Age: 61
End: 2021-03-25

## 2021-03-25 NOTE — TELEPHONE ENCOUNTER
----- Message from RISA Raya sent at 3/17/2021 12:07 PM EDT -----  Fecal calprotectin normal which is good news.  Lets see how she does with medication changes and keep follow-up with JAY Garcia

## 2021-04-23 ENCOUNTER — TELEPHONE (OUTPATIENT)
Dept: GASTROENTEROLOGY | Facility: CLINIC | Age: 61
End: 2021-04-23

## 2021-05-11 ENCOUNTER — APPOINTMENT (OUTPATIENT)
Dept: WOMENS IMAGING | Facility: HOSPITAL | Age: 61
End: 2021-05-11

## 2021-05-11 PROCEDURE — 77067 SCR MAMMO BI INCL CAD: CPT | Performed by: RADIOLOGY

## 2021-05-11 PROCEDURE — 77063 BREAST TOMOSYNTHESIS BI: CPT | Performed by: RADIOLOGY

## 2021-05-14 ENCOUNTER — OFFICE VISIT (OUTPATIENT)
Dept: GASTROENTEROLOGY | Facility: CLINIC | Age: 61
End: 2021-05-14

## 2021-05-14 VITALS — TEMPERATURE: 98 F | BODY MASS INDEX: 32.09 KG/M2 | HEIGHT: 65 IN | WEIGHT: 192.6 LBS

## 2021-05-14 DIAGNOSIS — K51.30 ULCERATIVE RECTOSIGMOIDITIS WITHOUT COMPLICATION (HCC): ICD-10-CM

## 2021-05-14 DIAGNOSIS — R19.7 DIARRHEA, UNSPECIFIED TYPE: ICD-10-CM

## 2021-05-14 DIAGNOSIS — K21.9 GASTRO-ESOPHAGEAL REFLUX DISEASE WITHOUT ESOPHAGITIS: Primary | ICD-10-CM

## 2021-05-14 PROCEDURE — 99214 OFFICE O/P EST MOD 30 MIN: CPT | Performed by: NURSE PRACTITIONER

## 2021-05-14 NOTE — PROGRESS NOTES
Chief Complaint   Patient presents with   • Follow-up       Rakel Velarde is a  60 y.o. female here for a follow up visit for ulcerative colitis.    HPI  60-year-old female presents today for follow-up visit for ulcerative rectosigmoiditis.  She is a patient of Dr. Carballo.  She was last seen in the office on 3/9/2021 by nurse practitioner Liss Jean.  She is new to me today.  At last visit patient was changed from balsalazide to Lialda 4 tabs daily.  Patient is very happy to report since switching to the Lialda the diarrhea and cramping have completely resolved.  She tells me her bowels are moving so much better now.  Last colonoscopy was on 2019.  Next screening colonoscopy will be due on 2022.  She does also have a history of GERD and admits lately her Nexium 20 mg every morning has not been working.  She has had to double up with Pepcid and even then she is not had 100% resolution of her symptoms.  She does me she has been on Nexium for a long time.  She denies any dysphagia, abdominal pain, nausea and vomiting, diarrhea, constipation, rectal bleeding or melena.  His appetite is good and her weight is stable.  She had labs and stool studies done recently which were unremarkable.  Past Medical History:   Diagnosis Date   • Angular cheilitis 2016   • Anxiety    • GERD (gastroesophageal reflux disease)    • Hallux valgus     followed by Ortho, Dr. Humphreys   • HSV infection     followed by OB/GYN, Dr. Maria Antonia Gray   • Hyperlipidemia    • Hypertension    • Obesity    • Osteopenia    • Ulcerative colitis (CMS/HCC)     followed by Dr. Carballo, Q2 yr colonoscopies   • Vitamin D deficiency        Past Surgical History:   Procedure Laterality Date   •  SECTION     • COLONOSCOPY  2011    Dr Dennison in 3-5 years   • COLONOSCOPY N/A 2019    Procedure: COLONOSCOPY to cecum and t.i. with bx and polypectomy;  Surgeon: Melanie Carballo MD;  Location: [facility] ENDOSCOPY;  Service: Gastroenterology    • HYSTEROSCOPY W/ ENDOMETRIAL ABLATION     • MANDIBLE FRACTURE SURGERY     • TONSILLECTOMY     • TUBAL ABDOMINAL LIGATION     • WISDOM TOOTH EXTRACTION         Scheduled Meds:    Continuous Infusions:No current facility-administered medications for this visit.      PRN Meds:.    Allergies   Allergen Reactions   • Lisinopril Cough       Social History     Socioeconomic History   • Marital status:      Spouse name: Not on file   • Number of children: Not on file   • Years of education: Not on file   • Highest education level: Not on file   Tobacco Use   • Smoking status: Never Smoker   • Smokeless tobacco: Never Used   Substance and Sexual Activity   • Alcohol use: Yes     Comment: socially   • Drug use: No   • Sexual activity: Defer       Family History   Problem Relation Age of Onset   • Breast cancer Mother 50   • Emphysema Mother    • Macular degeneration Mother    • Hypertension Mother    • Pancreatitis Mother    • Hypertension Father    • Dementia Father    • Parkinsonism Father    • Depression Brother    • Esophageal cancer Brother    • Hypertension Brother    • Melanoma Brother    • Lymphoma Maternal Grandmother    • Macular degeneration Maternal Grandmother    • Macular degeneration Maternal Aunt    • Macular degeneration Maternal Uncle    • Malig Hyperthermia Neg Hx        Review of Systems   Constitutional: Negative for appetite change, chills, diaphoresis, fatigue, fever and unexpected weight change.   HENT: Negative for nosebleeds, postnasal drip, sore throat, trouble swallowing and voice change.    Respiratory: Negative for cough, choking, chest tightness, shortness of breath and wheezing.    Cardiovascular: Negative for chest pain, palpitations and leg swelling.   Gastrointestinal: Negative for abdominal distention, abdominal pain, anal bleeding, blood in stool, constipation, diarrhea, nausea, rectal pain and vomiting.   Endocrine: Negative for polydipsia, polyphagia and polyuria.    Musculoskeletal: Negative for gait problem.   Skin: Negative for rash and wound.   Allergic/Immunologic: Negative for food allergies.   Neurological: Negative for dizziness, speech difficulty and light-headedness.   Psychiatric/Behavioral: Negative for confusion, self-injury, sleep disturbance and suicidal ideas.       Vitals:    05/14/21 1054   Temp: 98 °F (36.7 °C)       Physical Exam  Constitutional:       General: She is not in acute distress.     Appearance: She is well-developed. She is not ill-appearing.   HENT:      Head: Normocephalic.   Eyes:      Pupils: Pupils are equal, round, and reactive to light.   Cardiovascular:      Rate and Rhythm: Normal rate and regular rhythm.      Heart sounds: Normal heart sounds.   Pulmonary:      Effort: Pulmonary effort is normal.      Breath sounds: Normal breath sounds.   Abdominal:      General: Bowel sounds are normal. There is no distension.      Palpations: Abdomen is soft. There is no mass.      Tenderness: There is no abdominal tenderness. There is no guarding or rebound.      Hernia: No hernia is present.   Musculoskeletal:         General: Normal range of motion.   Skin:     General: Skin is warm and dry.   Neurological:      Mental Status: She is alert and oriented to person, place, and time.   Psychiatric:         Speech: Speech normal.         Behavior: Behavior normal.         Judgment: Judgment normal.         No radiology results for the last 7 days     Assessment and plan     1. Gastro-esophageal reflux disease without esophagitis    2. Ulcerative rectosigmoiditis without complication (CMS/[facility])    3. Diarrhea, unspecified type    Reviewed most recent stool study results with her today.  Those were all negative.  Ulcerative rectosigmoiditis is so much better controlled on Lialda 4 tabs daily.  Diarrhea and cramping have completely resolved.  Bowels are moving well.  GERD does not seem well controlled on Nexium 20 mg every morning.  For now would like her  to increase it to 40 mg every morning and see how that goes.  She can still add Pepcid OTC as needed.  Continue GERD precautions.  Patient to call the office next week with an update.  Patient to follow-up with me in 3 months.  Patient is agreeable to the plan.  Next screening colonoscopy will be due on 5/2022.

## 2021-06-12 DIAGNOSIS — I10 ESSENTIAL HYPERTENSION: ICD-10-CM

## 2021-06-13 RX ORDER — HYDROCHLOROTHIAZIDE 50 MG/1
50 TABLET ORAL DAILY
Qty: 30 TABLET | Refills: 0 | Status: SHIPPED | OUTPATIENT
Start: 2021-06-13 | End: 2021-06-16 | Stop reason: SDUPTHER

## 2021-06-16 ENCOUNTER — OFFICE VISIT (OUTPATIENT)
Dept: FAMILY MEDICINE CLINIC | Facility: CLINIC | Age: 61
End: 2021-06-16

## 2021-06-16 VITALS
RESPIRATION RATE: 16 BRPM | HEART RATE: 82 BPM | SYSTOLIC BLOOD PRESSURE: 142 MMHG | DIASTOLIC BLOOD PRESSURE: 88 MMHG | OXYGEN SATURATION: 98 % | WEIGHT: 191 LBS | BODY MASS INDEX: 31.82 KG/M2 | TEMPERATURE: 97.5 F | HEIGHT: 65 IN

## 2021-06-16 DIAGNOSIS — F41.9 ANXIETY AND DEPRESSION: ICD-10-CM

## 2021-06-16 DIAGNOSIS — I10 ESSENTIAL HYPERTENSION: Primary | ICD-10-CM

## 2021-06-16 DIAGNOSIS — E55.9 VITAMIN D DEFICIENCY: ICD-10-CM

## 2021-06-16 DIAGNOSIS — F41.9 ANXIETY: ICD-10-CM

## 2021-06-16 DIAGNOSIS — F32.A ANXIETY AND DEPRESSION: ICD-10-CM

## 2021-06-16 DIAGNOSIS — E78.2 MIXED HYPERLIPIDEMIA: ICD-10-CM

## 2021-06-16 PROCEDURE — 99214 OFFICE O/P EST MOD 30 MIN: CPT | Performed by: PHYSICIAN ASSISTANT

## 2021-06-16 RX ORDER — VALSARTAN 80 MG/1
80 TABLET ORAL DAILY
Qty: 90 TABLET | Refills: 0 | Status: SHIPPED | OUTPATIENT
Start: 2021-06-16 | End: 2021-09-06

## 2021-06-16 RX ORDER — HYDROCHLOROTHIAZIDE 50 MG/1
50 TABLET ORAL DAILY
Qty: 90 TABLET | Refills: 1 | Status: SHIPPED | OUTPATIENT
Start: 2021-06-16 | End: 2021-11-09

## 2021-06-16 RX ORDER — CITALOPRAM 20 MG/1
20 TABLET ORAL DAILY
Qty: 90 TABLET | Refills: 3 | Status: SHIPPED | OUTPATIENT
Start: 2021-06-16 | End: 2021-09-16

## 2021-06-16 RX ORDER — PROGESTERONE 100 MG/1
CAPSULE ORAL
COMMUNITY
Start: 2021-06-13 | End: 2022-07-26 | Stop reason: SDUPTHER

## 2021-06-16 NOTE — PROGRESS NOTES
"Subjective   Rakel Velarde is a 60 y.o. female.     History of Present Illness    Since the last visit, she has overall felt fairly well.  She has Essential Hypertension and well controlled on current medication, Hyperlipidemia and will start medication plan for treatment.  I will order follow up labs and Vitamin D deficiency and labs are at goal >30 ng/mL.  she has been compliant with current medications have reviewed them.  The patient denies medication side effects.  Will refill medications. /79 (BP Location: Left arm, Patient Position: Sitting, Cuff Size: Adult)   Pulse 82   Temp 97.5 °F (36.4 °C)   Resp 16   Ht 165.1 cm (65\")   Wt 86.6 kg (191 lb)   LMP  (LMP Unknown)   SpO2 98%   BMI 31.78 kg/m²     Results for orders placed or performed in visit on 03/09/21   Gastrointestinal Panel, PCR - Stool, Per Rectum    Specimen: Per Rectum; Stool    STOOL   Result Value Ref Range    Campylobacter Not Detected Not Detected    C.difficile toxin A/B Not Detected Not Detected    Plesiomonas shigelloides Not Detected Not Detected    Salmonella Not Detected Not Detected    Vibrio Not Detected Not Detected    Vibrio cholerae Not Detected Not Detected    Yersinia enterocolitica Not Detected Not Detected    Enteroaggregative E. coli Not Detected Not Detected    Enteropathogenic E. coli Not Detected Not Detected    Enterotoxigenic E. coli Not Detected Not Detected    Shiga-like toxin-producing E. coli  Not Detected Not Detected    E. coli O157 Not applicable Not Detected    Shigella enteroinvasive E. coli Not Detected Not Detected    Cryptosporidium Not Detected Not Detected    Cyclospora cayetanensis Not Detected Not Detected    Entamoeba Histolytica Ag Not Detected Not Detected    Giardia lamblia Not Detected Not Detected    Adenovirus 40/41 Ag Not Detected Not Detected    Astrovirus Not Detected Not Detected    Norovirus GI/GII Not Detected Not Detected    Rotavirus A Not Detected Not Detected    Sapovirus Not " Detected Not Detected   Comprehensive Metabolic Panel    Specimen: Blood   Result Value Ref Range    Glucose 95 65 - 99 mg/dL    BUN 16 8 - 23 mg/dL    Creatinine 0.74 0.57 - 1.00 mg/dL    eGFR Non African Am 80 >60 mL/min/1.73    eGFR African Am 97 >60 mL/min/1.73    BUN/Creatinine Ratio 21.6 7.0 - 25.0    Sodium 139 136 - 145 mmol/L    Potassium 3.9 3.5 - 5.2 mmol/L    Chloride 98 98 - 107 mmol/L    Total CO2 30.6 (H) 22.0 - 29.0 mmol/L    Calcium 10.2 8.6 - 10.5 mg/dL    Total Protein 7.1 6.0 - 8.5 g/dL    Albumin 4.20 3.50 - 5.20 g/dL    Globulin 2.9 gm/dL    A/G Ratio 1.4 g/dL    Total Bilirubin 0.3 0.0 - 1.2 mg/dL    Alkaline Phosphatase 63 39 - 117 U/L    AST (SGOT) 20 1 - 32 U/L    ALT (SGPT) 18 1 - 33 U/L   C-reactive Protein    Specimen: Blood   Result Value Ref Range    C-Reactive Protein <0.30 0.00 - 0.50 mg/dL   Sedimentation Rate    Specimen: Blood   Result Value Ref Range    Sed Rate 19 0 - 30 mm/hr   Calprotectin, Fecal - Stool, Per Rectum    Specimen: Per Rectum; Stool    STOOL   Result Value Ref Range    Calprotectin, Fecal 26 0 - 120 ug/g   CBC & Differential    Specimen: Blood   Result Value Ref Range    WBC 6.89 3.40 - 10.80 10*3/mm3    RBC 4.39 3.77 - 5.28 10*6/mm3    Hemoglobin 13.9 12.0 - 15.9 g/dL    Hematocrit 40.3 34.0 - 46.6 %    MCV 91.8 79.0 - 97.0 fL    MCH 31.7 26.6 - 33.0 pg    MCHC 34.5 31.5 - 35.7 g/dL    RDW 12.8 12.3 - 15.4 %    Platelets 286 140 - 450 10*3/mm3    Neutrophil Rel % 48.1 42.7 - 76.0 %    Lymphocyte Rel % 41.5 19.6 - 45.3 %    Monocyte Rel % 7.4 5.0 - 12.0 %    Eosinophil Rel % 1.9 0.3 - 6.2 %    Basophil Rel % 0.7 0.0 - 1.5 %    Neutrophils Absolute 3.31 1.70 - 7.00 10*3/mm3    Lymphocytes Absolute 2.86 0.70 - 3.10 10*3/mm3    Monocytes Absolute 0.51 0.10 - 0.90 10*3/mm3    Eosinophils Absolute 0.13 0.00 - 0.40 10*3/mm3    Basophils Absolute 0.05 0.00 - 0.20 10*3/mm3    Immature Granulocyte Rel % 0.4 0.0 - 0.5 %    Immature Grans Absolute 0.03 0.00 - 0.05 10*3/mm3  "   nRBC 0.0 0.0 - 0.2 /100 WBC     HRT--GYN  GI Dr Carballo  Watching potassium due to hydrochlorothiazide dose at 50 mg daily  Last chol score 5.38%  Rakel D Syd female 60 y.o., /79 (BP Location: Left arm, Patient Position: Sitting, Cuff Size: Adult)   Pulse 82   Temp 97.5 °F (36.4 °C)   Resp 16   Ht 165.1 cm (65\")   Wt 86.6 kg (191 lb)   LMP  (LMP Unknown)   SpO2 98%   BMI 31.78 kg/m²   who presents today for follow up of Depression and Anxiety.  She reports medication is working well, patient desires to continue on Rx, and needs refill. Onset of symptoms was approximately several years ago.  She denies current suicidal and homicidal ideation. Risk factors are family history of anxiety and or depression and lifestyle of multiple roles.  Previous treatment includes current Rx.  She complains of the following medication side effects: none. The patient declines to go to counseling..      The following portions of the patient's history were reviewed and updated as appropriate: allergies, current medications, past family history, past medical history, past social history, past surgical history and problem list.    Review of Systems   Constitutional: Negative for activity change, appetite change and unexpected weight change.   HENT: Negative for nosebleeds and trouble swallowing.    Eyes: Negative for pain and visual disturbance.   Respiratory: Negative for chest tightness, shortness of breath and wheezing.    Cardiovascular: Negative for chest pain and palpitations.   Gastrointestinal: Negative for abdominal pain and blood in stool.   Endocrine: Negative.    Genitourinary: Negative for difficulty urinating and hematuria.   Musculoskeletal: Negative for joint swelling.   Skin: Negative for color change and rash.   Allergic/Immunologic: Negative.    Neurological: Negative for syncope and speech difficulty.   Hematological: Negative for adenopathy.   Psychiatric/Behavioral: Negative for agitation and " confusion.   All other systems reviewed and are negative.      Objective   Physical Exam  Vitals and nursing note reviewed.   Constitutional:       General: She is not in acute distress.     Appearance: She is well-developed. She is not ill-appearing or toxic-appearing.   HENT:      Head: Normocephalic.      Right Ear: External ear normal.      Left Ear: External ear normal.      Nose: Nose normal.      Mouth/Throat:      Pharynx: Oropharynx is clear.   Eyes:      General: No scleral icterus.     Conjunctiva/sclera: Conjunctivae normal.      Pupils: Pupils are equal, round, and reactive to light.   Neck:      Thyroid: No thyromegaly.      Vascular: No carotid bruit.   Cardiovascular:      Rate and Rhythm: Normal rate and regular rhythm.      Pulses: Normal pulses.      Heart sounds: Normal heart sounds. No murmur heard.     Pulmonary:      Effort: Pulmonary effort is normal. No respiratory distress.      Breath sounds: Normal breath sounds. No rales.   Musculoskeletal:         General: No deformity. Normal range of motion.      Cervical back: Normal range of motion and neck supple.      Right lower leg: Edema present.      Left lower leg: Edema present.      Comments: Trace pedal edema = bilat     Skin:     General: Skin is warm and dry.      Coloration: Skin is not jaundiced.      Findings: No rash.   Neurological:      General: No focal deficit present.      Mental Status: She is alert and oriented to person, place, and time. Mental status is at baseline.   Psychiatric:         Mood and Affect: Mood normal.         Behavior: Behavior normal.         Thought Content: Thought content normal.         Judgment: Judgment normal.         Assessment/Plan   Diagnoses and all orders for this visit:    1. Essential hypertension (Primary)    2. Anxiety and depression    3. Vitamin D deficiency    4. Mixed hyperlipidemia    Plan, Rakel Velarde, was seen today.  she was seen for HTN and add/adjust medication, Hyperlipidemia  and will work on this with diet and exercise and Vitamin D deficiency and supplemented.  Sees GI---UC and GERD  Sees GYN  Cont Celexa for anxiety and depression  Labs Dec    Hx ACE cough; add low dose ARB  F/u few weeks bp

## 2021-07-09 DIAGNOSIS — I10 ESSENTIAL HYPERTENSION: ICD-10-CM

## 2021-07-09 RX ORDER — HYDROCHLOROTHIAZIDE 50 MG/1
50 TABLET ORAL DAILY
Qty: 30 TABLET | OUTPATIENT
Start: 2021-07-09

## 2021-08-16 ENCOUNTER — OFFICE VISIT (OUTPATIENT)
Dept: GASTROENTEROLOGY | Facility: CLINIC | Age: 61
End: 2021-08-16

## 2021-08-16 ENCOUNTER — TELEPHONE (OUTPATIENT)
Dept: GASTROENTEROLOGY | Facility: CLINIC | Age: 61
End: 2021-08-16

## 2021-08-16 VITALS — TEMPERATURE: 97.7 F | WEIGHT: 188.2 LBS | BODY MASS INDEX: 31.36 KG/M2 | HEIGHT: 65 IN

## 2021-08-16 DIAGNOSIS — K21.9 GASTRO-ESOPHAGEAL REFLUX DISEASE WITHOUT ESOPHAGITIS: ICD-10-CM

## 2021-08-16 DIAGNOSIS — K58.2 IRRITABLE BOWEL SYNDROME WITH BOTH CONSTIPATION AND DIARRHEA: ICD-10-CM

## 2021-08-16 DIAGNOSIS — K51.30 ULCERATIVE RECTOSIGMOIDITIS WITHOUT COMPLICATION (HCC): Primary | ICD-10-CM

## 2021-08-16 PROCEDURE — 99214 OFFICE O/P EST MOD 30 MIN: CPT | Performed by: NURSE PRACTITIONER

## 2021-08-16 RX ORDER — MESALAMINE 1.2 G/1
4800 TABLET, DELAYED RELEASE ORAL
Qty: 120 TABLET | Refills: 6 | Status: SHIPPED | OUTPATIENT
Start: 2021-08-16 | End: 2021-09-15 | Stop reason: SDUPTHER

## 2021-08-16 NOTE — PROGRESS NOTES
Chief Complaint   Patient presents with   • Ulcerative Colitis       Rakel Velarde is a  61 y.o. female here for a follow up visit for ulcerative colitis.    HPI  61-year-old female presents today for follow-up visit for ulcerative rectosigmoiditis.  She is a patient of Dr. Carballo.  She was last seen in the office by me on 2021.  She has a history of ulcerative rectosigmoiditis and is happy to report she is doing really well on Lialda 4 tabs daily.  She does seem to be stuck in this pattern of where she might have 1 to 2 days of constipation and then 1 to 2 days of diarrhea with lots of gas, bloating and fecal urgency.  She tells me she takes a daily probiotic but she does not take any fiber supplementation.  She also has a history of GERD and admits she takes Nexium 40 mg twice daily.  She reports since increasing the Nexium to twice daily her reflux has really been much better controlled.  She denies any dysphagia, abdominal pain, nausea and vomiting, rectal bleeding or melena.  She admits her appetite is good and her weight is stable.  Her last colonoscopy was on 2019.  Next screening colonoscopy will be due next May 2022.  She tells me she is up-to-date with all of her health screenings and vaccinations.  Most recent hemoglobin was stable at 13.9.  Past Medical History:   Diagnosis Date   • Angular cheilitis 2016   • Anxiety    • GERD (gastroesophageal reflux disease)    • Hallux valgus     followed by Ortho, Dr. Humphreys   • HSV infection     followed by OB/GYN, Dr. Maria Antonia Gray   • Hyperlipidemia    • Hypertension    • Obesity    • Osteopenia    • Ulcerative colitis (CMS/HCC)     followed by Dr. Carballo, Q2 yr colonoscopies   • Vitamin D deficiency        Past Surgical History:   Procedure Laterality Date   •  SECTION     • COLONOSCOPY  2011    Dr Dennison in 3-5 years   • COLONOSCOPY N/A 2019    Procedure: COLONOSCOPY to cecum and t.i. with bx and polypectomy;  Surgeon:  Melanie Carballo MD;  Location: [facility] ENDOSCOPY;  Service: Gastroenterology   • HYSTEROSCOPY W/ ENDOMETRIAL ABLATION     • MANDIBLE FRACTURE SURGERY     • TONSILLECTOMY     • TUBAL ABDOMINAL LIGATION     • WISDOM TOOTH EXTRACTION         Scheduled Meds:    Continuous Infusions:No current facility-administered medications for this visit.      PRN Meds:.    Allergies   Allergen Reactions   • Lisinopril Cough       Social History     Socioeconomic History   • Marital status:      Spouse name: Not on file   • Number of children: Not on file   • Years of education: Not on file   • Highest education level: Not on file   Tobacco Use   • Smoking status: Never Smoker   • Smokeless tobacco: Never Used   Substance and Sexual Activity   • Alcohol use: Yes     Comment: socially   • Drug use: No   • Sexual activity: Defer       Family History   Problem Relation Age of Onset   • Breast cancer Mother 50   • Emphysema Mother    • Macular degeneration Mother    • Hypertension Mother    • Pancreatitis Mother    • Hypertension Father    • Dementia Father    • Parkinsonism Father    • Depression Brother    • Esophageal cancer Brother    • Hypertension Brother    • Melanoma Brother    • Lymphoma Maternal Grandmother    • Macular degeneration Maternal Grandmother    • Macular degeneration Maternal Aunt    • Macular degeneration Maternal Uncle    • Malig Hyperthermia Neg Hx        Review of Systems   Constitutional: Negative for appetite change, chills, diaphoresis, fatigue, fever and unexpected weight change.   HENT: Negative for nosebleeds, postnasal drip, sore throat, trouble swallowing and voice change.    Respiratory: Negative for cough, choking, chest tightness, shortness of breath and wheezing.    Cardiovascular: Negative for chest pain, palpitations and leg swelling.   Gastrointestinal: Positive for constipation and diarrhea. Negative for abdominal distention, abdominal pain, anal bleeding, blood in stool, nausea, rectal  pain and vomiting.   Endocrine: Negative for polydipsia, polyphagia and polyuria.   Musculoskeletal: Negative for gait problem.   Skin: Negative for rash and wound.   Allergic/Immunologic: Negative for food allergies.   Neurological: Negative for dizziness, speech difficulty and light-headedness.   Psychiatric/Behavioral: Negative for confusion, self-injury, sleep disturbance and suicidal ideas.       Vitals:    08/16/21 0943   Temp: 97.7 °F (36.5 °C)       Physical Exam  Constitutional:       General: She is not in acute distress.     Appearance: She is well-developed. She is not ill-appearing.   HENT:      Head: Normocephalic.   Eyes:      Pupils: Pupils are equal, round, and reactive to light.   Cardiovascular:      Rate and Rhythm: Normal rate and regular rhythm.      Heart sounds: Normal heart sounds.   Pulmonary:      Effort: Pulmonary effort is normal.      Breath sounds: Normal breath sounds.   Abdominal:      General: Bowel sounds are normal. There is no distension.      Palpations: Abdomen is soft. There is no mass.      Tenderness: There is no abdominal tenderness. There is no guarding or rebound.      Hernia: No hernia is present.   Musculoskeletal:         General: Normal range of motion.   Skin:     General: Skin is warm and dry.   Neurological:      Mental Status: She is alert and oriented to person, place, and time.   Psychiatric:         Speech: Speech normal.         Behavior: Behavior normal.         Judgment: Judgment normal.         No radiology results for the last 7 days     Assessment and plan     1. Ulcerative rectosigmoiditis without complication (CMS/HCC)    2. Gastro-esophageal reflux disease without esophagitis    3. Irritable bowel syndrome with both constipation and diarrhea      Reviewed most recent labs with her today.  Hemoglobin was stable at 13.9.  Ulcerative rectosigmoiditis seems pretty well controlled on Lialda 4 tabs daily.  Seems like she is still trapped in the cycle of  constipation and then diarrhea.  I would like her to start a daily fiber supplement.  Increase her water as tolerated.  Continue daily probiotics.  GERD seems well controlled on Nexium 40 mg twice daily.  Continue GERD precautions.  She is up-to-date with all of her health screenings and vaccinations.  Next screening colonoscopy will be due 5/2022.  Patient to call the office with any issues.  Patient to follow-up with me in 6 months.  Patient is agreeable to the plan.

## 2021-08-16 NOTE — TELEPHONE ENCOUNTER
----- Message from RISA Hernandez sent at 8/16/2021 10:10 AM EDT -----  Patient says she got new insurance and her pharmacy was supposed to be sending us something to approve the Lialda.  I am assuming this is a prior authorization?  Can you check on this or send this to the right person I did know who was in charge of the PAs.  Thanks

## 2021-08-16 NOTE — TELEPHONE ENCOUNTER
" Escribe completed for lialda 1.2 gm - take 4 tabs by mouth daily, #120, R6.      Message to MA\"s.   "

## 2021-09-06 RX ORDER — VALSARTAN 80 MG/1
80 TABLET ORAL DAILY
Qty: 30 TABLET | Refills: 0 | Status: SHIPPED | OUTPATIENT
Start: 2021-09-06 | End: 2021-09-16 | Stop reason: SDUPTHER

## 2021-09-07 ENCOUNTER — TRANSCRIBE ORDERS (OUTPATIENT)
Dept: ADMINISTRATIVE | Facility: HOSPITAL | Age: 61
End: 2021-09-07

## 2021-09-07 DIAGNOSIS — Z80.3 FAMILY HISTORY OF MALIGNANT NEOPLASM OF BREAST: Primary | ICD-10-CM

## 2021-09-10 ENCOUNTER — TELEPHONE (OUTPATIENT)
Dept: GASTROENTEROLOGY | Facility: CLINIC | Age: 61
End: 2021-09-10

## 2021-09-10 NOTE — TELEPHONE ENCOUNTER
----- Message from Mehul Higuera Rep sent at 9/10/2021  3:27 PM EDT -----  Regarding: med issues  Contact: [phone]  Pt was prescribed        mesalamine (LIALDA) 1.2 g EC tablet , her insurance is telling her name brand only, pharmacy is telling her it is on back order. You are wanting to know if three is something equivalent to this med that can be called in.           [id number] [employer] - [place], [place] - [address] - [phone]  - [phone]    [address] [address]   Phone:  [phone]  Fax:  [phone]

## 2021-09-10 NOTE — TELEPHONE ENCOUNTER
I am ok with generic if that is what the question is.  Otherwise needs to try a different pharmacy.

## 2021-09-13 NOTE — TELEPHONE ENCOUNTER
Call to pt.  Advise per DR Carballo note.  Verb understanding.     States did try to obtain from other pharmacies - not available.  States her insurance will only cover name brand lialda - not generic (see note of 8/16).  States generic also expensive.  Currently taking colazol to tide her over.     Call to [facility] @ [phone] - on hold >15 min.  Will try back.

## 2021-09-14 NOTE — TELEPHONE ENCOUNTER
Call to [facility] and spoke with Pharmacist.  Verifies that insurance will only cover brand name lialda -  reports on indefinite back order.     Message to DR Carballo.

## 2021-09-16 ENCOUNTER — OFFICE VISIT (OUTPATIENT)
Dept: FAMILY MEDICINE CLINIC | Facility: CLINIC | Age: 61
End: 2021-09-16

## 2021-09-16 VITALS
SYSTOLIC BLOOD PRESSURE: 110 MMHG | HEART RATE: 85 BPM | HEIGHT: 65 IN | RESPIRATION RATE: 16 BRPM | OXYGEN SATURATION: 99 % | WEIGHT: 186.6 LBS | TEMPERATURE: 97.5 F | BODY MASS INDEX: 31.09 KG/M2 | DIASTOLIC BLOOD PRESSURE: 70 MMHG

## 2021-09-16 DIAGNOSIS — R73.01 IMPAIRED FASTING GLUCOSE: ICD-10-CM

## 2021-09-16 DIAGNOSIS — F32.A ANXIETY AND DEPRESSION: ICD-10-CM

## 2021-09-16 DIAGNOSIS — I10 ESSENTIAL HYPERTENSION: Primary | ICD-10-CM

## 2021-09-16 DIAGNOSIS — E78.2 MIXED HYPERLIPIDEMIA: ICD-10-CM

## 2021-09-16 DIAGNOSIS — F41.9 ANXIETY AND DEPRESSION: ICD-10-CM

## 2021-09-16 PROCEDURE — 99214 OFFICE O/P EST MOD 30 MIN: CPT | Performed by: PHYSICIAN ASSISTANT

## 2021-09-16 RX ORDER — CITALOPRAM 40 MG/1
40 TABLET ORAL DAILY
Qty: 90 TABLET | Refills: 3 | Status: SHIPPED | OUTPATIENT
Start: 2021-09-16 | End: 2021-12-16

## 2021-09-16 RX ORDER — VALSARTAN 80 MG/1
80 TABLET ORAL DAILY
Qty: 90 TABLET | Refills: 1 | Status: SHIPPED | OUTPATIENT
Start: 2021-09-16 | End: 2021-12-16 | Stop reason: SDUPTHER

## 2021-09-16 NOTE — PROGRESS NOTES
"Subjective   Rakelmara Velarde is a 61 y.o. female.     History of Present Illness    Since the last visit, she has overall felt well.  She has Impaired fasting glucose and will monitor labs to watch for DMII, GERD controlled on PPI Rx, Hyperlipidemia and working on this with diet and exercise, Vitamin D deficiency and will update labs for continued management and essential HTN-----here today since adding ARB in June;  hx ACE cough.  she has been compliant with current medications have reviewed them.  The patient denies medication side effects.  Will refill medications. /70   Pulse 85   Temp 97.5 °F (36.4 °C)   Resp 16   Ht 165.1 cm (65\")   Wt 84.6 kg (186 lb 9.6 oz)   LMP  (LMP Unknown)   SpO2 99%   BMI 31.05 kg/m²   Sees GI;  On PPI --GERD and UC  Results for orders placed or performed in visit on 03/09/21   Gastrointestinal Panel, PCR - Stool, Per Rectum    Specimen: Per Rectum; Stool    STOOL   Result Value Ref Range    Campylobacter Not Detected Not Detected    C.difficile toxin A/B Not Detected Not Detected    Plesiomonas shigelloides Not Detected Not Detected    Salmonella Not Detected Not Detected    Vibrio Not Detected Not Detected    Vibrio cholerae Not Detected Not Detected    Yersinia enterocolitica Not Detected Not Detected    Enteroaggregative E. coli Not Detected Not Detected    Enteropathogenic E. coli Not Detected Not Detected    Enterotoxigenic E. coli Not Detected Not Detected    Shiga-like toxin-producing E. coli  Not Detected Not Detected    E. coli O157 Not applicable Not Detected    Shigella enteroinvasive E. coli Not Detected Not Detected    Cryptosporidium Not Detected Not Detected    Cyclospora cayetanensis Not Detected Not Detected    Entamoeba Histolytica Ag Not Detected Not Detected    Giardia lamblia Not Detected Not Detected    Adenovirus 40/41 Ag Not Detected Not Detected    Astrovirus Not Detected Not Detected    Norovirus GI/GII Not Detected Not Detected    Rotavirus A " Not Detected Not Detected    Sapovirus Not Detected Not Detected   Comprehensive Metabolic Panel    Specimen: Blood   Result Value Ref Range    Glucose 95 65 - 99 mg/dL    BUN 16 8 - 23 mg/dL    Creatinine 0.74 0.57 - 1.00 mg/dL    eGFR Non African Am 80 >60 mL/min/1.73    eGFR African Am 97 >60 mL/min/1.73    BUN/Creatinine Ratio 21.6 7.0 - 25.0    Sodium 139 136 - 145 mmol/L    Potassium 3.9 3.5 - 5.2 mmol/L    Chloride 98 98 - 107 mmol/L    Total CO2 30.6 (H) 22.0 - 29.0 mmol/L    Calcium 10.2 8.6 - 10.5 mg/dL    Total Protein 7.1 6.0 - 8.5 g/dL    Albumin 4.20 3.50 - 5.20 g/dL    Globulin 2.9 gm/dL    A/G Ratio 1.4 g/dL    Total Bilirubin 0.3 0.0 - 1.2 mg/dL    Alkaline Phosphatase 63 39 - 117 U/L    AST (SGOT) 20 1 - 32 U/L    ALT (SGPT) 18 1 - 33 U/L   C-reactive Protein    Specimen: Blood   Result Value Ref Range    C-Reactive Protein <0.30 0.00 - 0.50 mg/dL   Sedimentation Rate    Specimen: Blood   Result Value Ref Range    Sed Rate 19 0 - 30 mm/hr   Calprotectin, Fecal - Stool, Per Rectum    Specimen: Per Rectum; Stool    STOOL   Result Value Ref Range    Calprotectin, Fecal 26 0 - 120 ug/g   CBC & Differential    Specimen: Blood   Result Value Ref Range    WBC 6.89 3.40 - 10.80 10*3/mm3    RBC 4.39 3.77 - 5.28 10*6/mm3    Hemoglobin 13.9 12.0 - 15.9 g/dL    Hematocrit 40.3 34.0 - 46.6 %    MCV 91.8 79.0 - 97.0 fL    MCH 31.7 26.6 - 33.0 pg    MCHC 34.5 31.5 - 35.7 g/dL    RDW 12.8 12.3 - 15.4 %    Platelets 286 140 - 450 10*3/mm3    Neutrophil Rel % 48.1 42.7 - 76.0 %    Lymphocyte Rel % 41.5 19.6 - 45.3 %    Monocyte Rel % 7.4 5.0 - 12.0 %    Eosinophil Rel % 1.9 0.3 - 6.2 %    Basophil Rel % 0.7 0.0 - 1.5 %    Neutrophils Absolute 3.31 1.70 - 7.00 10*3/mm3    Lymphocytes Absolute 2.86 0.70 - 3.10 10*3/mm3    Monocytes Absolute 0.51 0.10 - 0.90 10*3/mm3    Eosinophils Absolute 0.13 0.00 - 0.40 10*3/mm3    Basophils Absolute 0.05 0.00 - 0.20 10*3/mm3    Immature Granulocyte Rel % 0.4 0.0 - 0.5 %     "Immature Grans Absolute 0.03 0.00 - 0.05 10*3/mm3    nRBC 0.0 0.0 - 0.2 /100 WBC       Rakel D Syd female 61 y.o., /70   Pulse 85   Temp 97.5 °F (36.4 °C)   Resp 16   Ht 165.1 cm (65\")   Wt 84.6 kg (186 lb 9.6 oz)   LMP  (LMP Unknown)   SpO2 99%   BMI 31.05 kg/m²   who presents today for follow up of Depression and Anxiety.  She reports crying spells, anhedonia, impaired concentration, excessive worry, unable to relax and sleep disturbance. Onset of symptoms was approximately several months ago.  She denies current suicidal and homicidal ideation. Risk factors are family history of anxiety and or depression, lifestyle of multiple roles and divorce or separation.  Previous treatment includes current Rx. She complains of the following medication side effects: none. The patient declines to go to counseling..  Plan to raise dose Celexa        The following portions of the patient's history were reviewed and updated as appropriate: allergies, current medications, past family history, past medical history, past social history, past surgical history and problem list.    Review of Systems   Constitutional: Negative for activity change, appetite change and unexpected weight change.   HENT: Negative for nosebleeds and trouble swallowing.    Eyes: Negative for pain and visual disturbance.   Respiratory: Negative for chest tightness, shortness of breath and wheezing.    Cardiovascular: Negative for chest pain and palpitations.   Gastrointestinal: Negative for abdominal pain and blood in stool.   Endocrine: Negative.    Genitourinary: Negative for difficulty urinating and hematuria.   Musculoskeletal: Negative for joint swelling.   Skin: Negative for color change and rash.   Allergic/Immunologic: Negative.    Neurological: Negative for syncope and speech difficulty.   Hematological: Negative for adenopathy.   Psychiatric/Behavioral: Negative for agitation and confusion.   All other systems reviewed and are " negative.      Objective   Physical Exam  Vitals and nursing note reviewed.   Constitutional:       General: She is not in acute distress.     Appearance: She is well-developed. She is not ill-appearing or toxic-appearing.   HENT:      Head: Normocephalic.      Right Ear: External ear normal.      Left Ear: External ear normal.      Nose: Nose normal.      Mouth/Throat:      Pharynx: Oropharynx is clear.   Eyes:      General: No scleral icterus.     Conjunctiva/sclera: Conjunctivae normal.      Pupils: Pupils are equal, round, and reactive to light.   Neck:      Thyroid: No thyromegaly.      Vascular: No carotid bruit.   Cardiovascular:      Rate and Rhythm: Normal rate and regular rhythm.      Heart sounds: Normal heart sounds. No murmur heard.     Pulmonary:      Effort: Pulmonary effort is normal. No respiratory distress.      Breath sounds: Normal breath sounds. No rales.   Musculoskeletal:         General: No deformity. Normal range of motion.      Cervical back: Normal range of motion and neck supple.   Skin:     General: Skin is warm and dry.      Coloration: Skin is not jaundiced.      Findings: No rash.   Neurological:      General: No focal deficit present.      Mental Status: She is alert and oriented to person, place, and time. Mental status is at baseline.   Psychiatric:         Behavior: Behavior normal.         Thought Content: Thought content normal.         Judgment: Judgment normal.         Assessment/Plan   Diagnoses and all orders for this visit:    1. Essential hypertension (Primary)      Plan, Rakel Velarde, was seen today.  she was seen for HTN and continue medication, Imparied fasting glucose and plan follow up labs, diet, and exercise, GERD and will continue on PPI medication and Hyperlipidemia and will work on this with diet and exercise.  Raise dose Celexa----for break through depression  Labs due Dec  Added ARB June

## 2021-11-09 DIAGNOSIS — I10 ESSENTIAL HYPERTENSION: ICD-10-CM

## 2021-11-09 RX ORDER — HYDROCHLOROTHIAZIDE 50 MG/1
50 TABLET ORAL DAILY
Qty: 90 TABLET | Refills: 0 | Status: SHIPPED | OUTPATIENT
Start: 2021-11-09 | End: 2021-12-16 | Stop reason: SDUPTHER

## 2021-11-10 ENCOUNTER — HOSPITAL ENCOUNTER (OUTPATIENT)
Dept: MRI IMAGING | Facility: HOSPITAL | Age: 61
Discharge: HOME | End: 2021-11-10
Admitting: OBSTETRICS & GYNECOLOGY

## 2021-11-10 DIAGNOSIS — Z80.3 FAMILY HISTORY OF MALIGNANT NEOPLASM OF BREAST: ICD-10-CM

## 2021-11-10 PROCEDURE — 77049 MRI BREAST C-+ W/CAD BI: CPT

## 2021-11-10 PROCEDURE — A9577 INJ MULTIHANCE: HCPCS | Performed by: OBSTETRICS & GYNECOLOGY

## 2021-11-10 PROCEDURE — 0 GADOBENATE DIMEGLUMINE 529 MG/ML SOLUTION: Performed by: OBSTETRICS & GYNECOLOGY

## 2021-11-10 PROCEDURE — 82565 ASSAY OF CREATININE: CPT

## 2021-11-10 RX ADMIN — GADOBENATE DIMEGLUMINE 17 ML: 529 INJECTION, SOLUTION INTRAVENOUS at 11:58

## 2021-11-11 LAB — CREAT BLDA-MCNC: 0.8 MG/DL (ref 0.6–1.3)

## 2021-12-16 ENCOUNTER — OFFICE VISIT (OUTPATIENT)
Dept: FAMILY MEDICINE CLINIC | Facility: CLINIC | Age: 61
End: 2021-12-16

## 2021-12-16 VITALS
RESPIRATION RATE: 16 BRPM | HEART RATE: 88 BPM | TEMPERATURE: 97.5 F | DIASTOLIC BLOOD PRESSURE: 69 MMHG | HEIGHT: 65 IN | SYSTOLIC BLOOD PRESSURE: 110 MMHG | BODY MASS INDEX: 31.16 KG/M2 | WEIGHT: 187 LBS | OXYGEN SATURATION: 99 %

## 2021-12-16 DIAGNOSIS — I10 ESSENTIAL HYPERTENSION: ICD-10-CM

## 2021-12-16 DIAGNOSIS — F41.9 ANXIETY AND DEPRESSION: Primary | ICD-10-CM

## 2021-12-16 DIAGNOSIS — F32.A ANXIETY AND DEPRESSION: Primary | ICD-10-CM

## 2021-12-16 PROCEDURE — 99213 OFFICE O/P EST LOW 20 MIN: CPT | Performed by: PHYSICIAN ASSISTANT

## 2021-12-16 RX ORDER — CITALOPRAM 20 MG/1
20 TABLET ORAL DAILY
Qty: 90 TABLET | Refills: 3 | Status: SHIPPED | OUTPATIENT
Start: 2021-12-16 | End: 2022-12-27 | Stop reason: SDUPTHER

## 2021-12-16 RX ORDER — NYSTATIN AND TRIAMCINOLONE ACETONIDE 100000; 1 [USP'U]/G; MG/G
OINTMENT TOPICAL 2 TIMES DAILY
Qty: 60 G | Refills: 1 | Status: SHIPPED | OUTPATIENT
Start: 2021-12-16 | End: 2023-03-15

## 2021-12-16 RX ORDER — VALSARTAN 80 MG/1
80 TABLET ORAL DAILY
Qty: 90 TABLET | Refills: 1 | Status: SHIPPED | OUTPATIENT
Start: 2021-12-16 | End: 2022-06-16 | Stop reason: SDUPTHER

## 2021-12-16 RX ORDER — HYDROCHLOROTHIAZIDE 50 MG/1
50 TABLET ORAL DAILY
Qty: 90 TABLET | Refills: 1 | Status: SHIPPED | OUTPATIENT
Start: 2021-12-16 | End: 2022-06-16 | Stop reason: SDUPTHER

## 2021-12-16 NOTE — PROGRESS NOTES
"Subjective   Rakel Velarde is a 61 y.o. female.     History of Present Illness   Rakel Velarde female 61 y.o., /69 (BP Location: Left arm, Patient Position: Sitting, Cuff Size: Adult)   Pulse 88   Temp 97.5 °F (36.4 °C)   Resp 16   Ht 165.1 cm (65\")   Wt 84.8 kg (187 lb)   LMP  (LMP Unknown)   SpO2 99%   BMI 31.12 kg/m²   who presents today for follow up of Depression and Anxiety.  She reports medication is working well, patient desires to continue on Rx, and needs refill and lower dose is working!!!. Onset of symptoms was approximately several years ago. She denies current suicidal and homicidal ideation. Risk factors are family history of anxiety and or depression and lifestyle of multiple roles.  Previous treatment includes current Rx. She complains of the following medication side effects:none. The patient has previously been in counseling..    Following up today from raising dose of Celexa for breakthrough depression and anxiety from 9/16/2021 visit.  She did not go up on dose and doing fine.  Noted at 9/16/2021 visit her blood pressure is well controlled on Rx.  Watching her glucose and lipids with labs at least yearly.      Sees GI;  On PPI --GERD and UC    Refill cream---angular celitis and works PRN  The following portions of the patient's history were reviewed and updated as appropriate: allergies, current medications, past family history, past medical history, past social history, past surgical history and problem list.    Review of Systems   Constitutional: Negative for activity change, appetite change and unexpected weight change.   HENT: Negative for nosebleeds and trouble swallowing.    Eyes: Negative for pain and visual disturbance.   Respiratory: Negative for chest tightness, shortness of breath and wheezing.    Cardiovascular: Negative for chest pain and palpitations.   Gastrointestinal: Negative for abdominal pain and blood in stool.   Endocrine: Negative.    Genitourinary: " Negative for difficulty urinating and hematuria.   Musculoskeletal: Negative for joint swelling.   Skin: Negative for color change and rash.   Allergic/Immunologic: Negative.    Neurological: Negative for syncope and speech difficulty.   Hematological: Negative for adenopathy.   Psychiatric/Behavioral: Negative for agitation and confusion.   All other systems reviewed and are negative.      Objective   Physical Exam  Vitals and nursing note reviewed.   Constitutional:       General: She is not in acute distress.     Appearance: She is well-developed. She is not ill-appearing or toxic-appearing.   HENT:      Head: Normocephalic.      Right Ear: External ear normal.      Left Ear: External ear normal.      Nose: Nose normal.      Mouth/Throat:      Pharynx: Oropharynx is clear.   Eyes:      General: No scleral icterus.     Conjunctiva/sclera: Conjunctivae normal.      Pupils: Pupils are equal, round, and reactive to light.   Neck:      Thyroid: No thyromegaly.   Cardiovascular:      Rate and Rhythm: Normal rate and regular rhythm.      Heart sounds: Normal heart sounds. No murmur heard.      Pulmonary:      Effort: Pulmonary effort is normal. No respiratory distress.      Breath sounds: Normal breath sounds.   Musculoskeletal:         General: No deformity. Normal range of motion.      Cervical back: Normal range of motion and neck supple.   Skin:     General: Skin is warm and dry.      Findings: No rash.   Neurological:      General: No focal deficit present.      Mental Status: She is alert and oriented to person, place, and time. Mental status is at baseline.   Psychiatric:         Mood and Affect: Mood normal.         Behavior: Behavior normal.         Thought Content: Thought content normal.         Judgment: Judgment normal.         Assessment/Plan   Diagnoses and all orders for this visit:    1. Anxiety and depression (Primary)    2. Essential hypertension  -     hydroCHLOROthiazide (HYDRODIURIL) 50 MG tablet;  Take 1 tablet by mouth Daily. for blood pressure  Dispense: 90 tablet; Refill: 1    Other orders  -     valsartan (DIOVAN) 80 MG tablet; Take 1 tablet by mouth Daily. For BP-new and still take HCTZ  Dispense: 90 tablet; Refill: 1  -     citalopram (CeleXA) 20 MG tablet; Take 1 tablet by mouth Daily. For stress  Dispense: 90 tablet; Refill: 3      Refill cream---angular celitis and works PRN  Celexa---ok stay on 20mg for anxiety and depression--works  Need labs  Plan, Rakel Velarde, was seen today.  she was seen for HTN and continue medication, Hyperlipidemia and will work on this with diet and exercise and Vitamin D deficiency and supplemented.  Watching K+ dT HCTZ

## 2022-01-03 RX ORDER — MESALAMINE 250 MG/1
1000 CAPSULE ORAL
Qty: 500 CAPSULE | Refills: 1 | Status: SHIPPED | OUTPATIENT
Start: 2022-01-03 | End: 2022-05-03

## 2022-01-04 NOTE — TELEPHONE ENCOUNTER
Called pt and advised of Dr Carballo note. Verb understanding and states will call back to make appt.

## 2022-03-31 ENCOUNTER — TELEPHONE (OUTPATIENT)
Dept: GASTROENTEROLOGY | Facility: CLINIC | Age: 62
End: 2022-03-31

## 2022-03-31 ENCOUNTER — OFFICE VISIT (OUTPATIENT)
Dept: GASTROENTEROLOGY | Facility: CLINIC | Age: 62
End: 2022-03-31

## 2022-03-31 VITALS
DIASTOLIC BLOOD PRESSURE: 72 MMHG | BODY MASS INDEX: 31.49 KG/M2 | SYSTOLIC BLOOD PRESSURE: 125 MMHG | HEIGHT: 65 IN | WEIGHT: 189 LBS

## 2022-03-31 DIAGNOSIS — K21.9 GASTRO-ESOPHAGEAL REFLUX DISEASE WITHOUT ESOPHAGITIS: Chronic | ICD-10-CM

## 2022-03-31 DIAGNOSIS — K51.30 ULCERATIVE RECTOSIGMOIDITIS WITHOUT COMPLICATION: Primary | Chronic | ICD-10-CM

## 2022-03-31 DIAGNOSIS — Z80.0 FAMILY HISTORY OF ESOPHAGEAL CANCER: ICD-10-CM

## 2022-03-31 PROCEDURE — 99214 OFFICE O/P EST MOD 30 MIN: CPT | Performed by: INTERNAL MEDICINE

## 2022-03-31 RX ORDER — SODIUM CHLORIDE, SODIUM LACTATE, POTASSIUM CHLORIDE, CALCIUM CHLORIDE 600; 310; 30; 20 MG/100ML; MG/100ML; MG/100ML; MG/100ML
30 INJECTION, SOLUTION INTRAVENOUS CONTINUOUS
Status: CANCELLED | OUTPATIENT
Start: 2022-06-03

## 2022-03-31 RX ORDER — SODIUM CHLORIDE, SODIUM LACTATE, POTASSIUM CHLORIDE, CALCIUM CHLORIDE 600; 310; 30; 20 MG/100ML; MG/100ML; MG/100ML; MG/100ML
30 INJECTION, SOLUTION INTRAVENOUS CONTINUOUS
Status: CANCELLED | OUTPATIENT
Start: 2022-03-31

## 2022-03-31 NOTE — PROGRESS NOTES
Chief Complaint   Patient presents with   • Ulcerative Colitis   • Constipation   • Diarrhea   • Irritable Bowel Syndrome   • Heartburn       Subjective     HPI    Rakel D Syd is a 61 y.o. female with a past medical history noted below who presents for follow-up of ulcerative proctitis.  She has been on mesalamine.  She also has had issues with constipation and GERD.    She is taking a maintenance dose of mesalamine.  She says she is mostly okay but about every couple weeks she will have a flare of symptoms where she has more cramping, pressure and diarrhea.  It would last about a day and then resolved.  There is no blood in the stool.    She does have GERD controlled on Nexium.  No recent EGD.  Her brother had esophageal cancer.      Today's visit was in the office.  Both the patient and I were wearing face masks and proper hand hygiene was performed before and after the physical exam.           Current Outpatient Medications:   •  Calcium-Magnesium-Vitamin D (CALCIUM 500 PO), Take by mouth., Disp: , Rfl:   •  Cholecalciferol (VITAMIN D3) 2000 UNITS tablet, Take 2 tablets by mouth daily., Disp: , Rfl:   •  citalopram (CeleXA) 20 MG tablet, Take 1 tablet by mouth Daily. For stress, Disp: 90 tablet, Rfl: 3  •  esomeprazole (nexIUM) 20 MG capsule, Take 20 mg by mouth Every Morning Before Breakfast., Disp: , Rfl:   •  estradiol (ESTRACE) 0.5 MG tablet, , Disp: , Rfl:   •  hydroCHLOROthiazide (HYDRODIURIL) 50 MG tablet, Take 1 tablet by mouth Daily. for blood pressure, Disp: 90 tablet, Rfl: 1  •  mesalamine (Pentasa) 250 MG CR capsule, Take 4 capsules by mouth 2 (Two) Times a Day Before Meals., Disp: 500 capsule, Rfl: 1  •  nystatin-triamcinolone (MYCOLOG) 450070-5.1 UNIT/GM-% ointment, Apply  topically to the appropriate area as directed 2 (Two) Times a Day. To mouth corners, Disp: 60 g, Rfl: 1  •  Progesterone (PROMETRIUM) 100 MG capsule, , Disp: , Rfl:   •  valACYclovir (VALTREX) 500 MG tablet, Take 500 mg by  mouth 2 (Two) Times a Day., Disp: , Rfl:   •  valsartan (DIOVAN) 80 MG tablet, Take 1 tablet by mouth Daily. For BP-new and still take HCTZ, Disp: 90 tablet, Rfl: 1  •  fluticasone (CUTIVATE) 0.05 % cream, Apply  topically to the appropriate area as directed 2 (Two) Times a Day. To eczema PRN, Disp: 60 g, Rfl: 5  •  hyoscyamine (LEVSIN) 0.125 MG SL tablet, Place 1 tablet under the tongue Every 6 (Six) Hours As Needed for Cramping or Diarrhea., Disp: 90 tablet, Rfl: 1      Objective     Vitals:    03/31/22 1021   BP: 125/72         03/31/22  1021   Weight: 85.7 kg (189 lb)     Body mass index is 31.45 kg/m².    Physical Exam  Constitutional:       General: She is not in acute distress.  Pulmonary:      Effort: Pulmonary effort is normal.   Neurological:      Mental Status: She is alert and oriented to person, place, and time.   Psychiatric:         Mood and Affect: Mood normal.         Behavior: Behavior normal.         Thought Content: Thought content normal.         Judgment: Judgment normal.             WBC   Date Value Ref Range Status   12/16/2021 7.0 3.4 - 10.8 x10E3/uL Final     RBC   Date Value Ref Range Status   12/16/2021 4.24 3.77 - 5.28 x10E6/uL Final     Hemoglobin   Date Value Ref Range Status   12/16/2021 13.7 11.1 - 15.9 g/dL Final     Hematocrit   Date Value Ref Range Status   12/16/2021 40.2 34.0 - 46.6 % Final     MCV   Date Value Ref Range Status   12/16/2021 95 79 - 97 fL Final     MCH   Date Value Ref Range Status   12/16/2021 32.3 26.6 - 33.0 pg Final     MCHC   Date Value Ref Range Status   12/16/2021 34.1 31.5 - 35.7 g/dL Final     RDW   Date Value Ref Range Status   12/16/2021 13.0 11.7 - 15.4 % Final     Platelets   Date Value Ref Range Status   12/16/2021 294 150 - 450 x10E3/uL Final     Neutrophil Rel %   Date Value Ref Range Status   12/16/2021 46 Not Estab. % Final     Lymphocyte Rel %   Date Value Ref Range Status   12/16/2021 43 Not Estab. % Final     Monocyte Rel %   Date Value Ref  Range Status   12/16/2021 7 Not Estab. % Final     Eosinophil Rel %   Date Value Ref Range Status   12/16/2021 3 Not Estab. % Final     Basophil Rel %   Date Value Ref Range Status   12/16/2021 1 Not Estab. % Final     Neutrophils Absolute   Date Value Ref Range Status   12/16/2021 3.2 1.4 - 7.0 x10E3/uL Final     Lymphocytes Absolute   Date Value Ref Range Status   12/16/2021 3.0 0.7 - 3.1 x10E3/uL Final     Monocytes Absolute   Date Value Ref Range Status   12/16/2021 0.5 0.1 - 0.9 x10E3/uL Final     Eosinophils Absolute   Date Value Ref Range Status   12/16/2021 0.2 0.0 - 0.4 x10E3/uL Final     Basophils Absolute   Date Value Ref Range Status   12/16/2021 0.1 0.0 - 0.2 x10E3/uL Final     nRBC   Date Value Ref Range Status   03/09/2021 0.0 0.0 - 0.2 /100 WBC Final       Lab Results   Component Value Date    GLUCOSE 87 12/16/2021    BUN 14 12/16/2021    CREATININE 0.76 12/16/2021    EGFRIFNONA 84 12/16/2021    EGFRIFAFRI 97 12/16/2021    BCR 18 12/16/2021    CO2 27 12/16/2021    CALCIUM 9.9 12/16/2021    PROTENTOTREF 7.3 12/16/2021    ALBUMIN 4.2 12/16/2021    LABIL2 1.4 12/16/2021    AST 19 12/16/2021    ALT 24 12/16/2021         Imaging Results (Last 7 Days)     ** No results found for the last 168 hours. **          I personally reviewed data as detailed below:     The labs listed above.    Office notes from: 12/16/21 pcp    Endoscopy procedures and pathology from: 5/2019 colonoscopy         Assessment/Plan    1.  Ulcerative colitis: Symptoms overall pretty stable on mesalamine, some rare breakthrough episodes versus IBD issues    2.  GERD: Stable on PPI but no recent EGD    3.  Family history of esophageal cancer: No recent EGD    Plan  Continue mesalamine  Trial of hyoscyamine as needed for her diarrheal episodes  Colonoscopy for surveillance of her UC, EGD at the same time for chronic GERD symptoms in a 61-year-old woman as well as family history of esophageal cancer    Diagnoses and all orders for this  visit:    1. Ulcerative rectosigmoiditis without complication (HCC) (Primary)  -     Cancel: Case Request; Standing  -     Cancel: Follow Anesthesia Guidelines / Standing Orders; Future  -     Cancel: Obtain Informed Consent; Future  -     Cancel: Case Request  -     Case Request; Standing  -     COVID PRE-OP / PRE-PROCEDURE SCREENING ORDER (NO ISOLATION) - Swab, Nasopharynx; Future  -     Follow Anesthesia Guidelines / Standing Orders; Future  -     Obtain Informed Consent; Future  -     Case Request    2. Gastro-esophageal reflux disease without esophagitis  -     Case Request; Standing  -     COVID PRE-OP / PRE-PROCEDURE SCREENING ORDER (NO ISOLATION) - Swab, Nasopharynx; Future  -     Follow Anesthesia Guidelines / Standing Orders; Future  -     Obtain Informed Consent; Future  -     Case Request    3. Family history of esophageal cancer    Other orders  -     hyoscyamine (LEVSIN) 0.125 MG SL tablet; Place 1 tablet under the tongue Every 6 (Six) Hours As Needed for Cramping or Diarrhea.  Dispense: 90 tablet; Refill: 1        I have discussed the above plan with the patient.  They verbalize understanding and are in agreement with the plan.  They have been advised to contact the office for any questions, concerns, or changes related to their health.    Dictated utilizing Dragon dictation

## 2022-03-31 NOTE — TELEPHONE ENCOUNTER
CLAUDIO Kate for colonoscopy/EGD on 06/03/2022  arrive at 11am   . Gave Prep instructions in office. ----miralax      Advised PT  that  will call with final arrival time  24 hrs before procedure. If they do not get a phone call, arrival time will stay the same as given on instructions

## 2022-04-20 ENCOUNTER — TELEPHONE (OUTPATIENT)
Dept: FAMILY MEDICINE CLINIC | Facility: CLINIC | Age: 62
End: 2022-04-20

## 2022-04-20 DIAGNOSIS — M21.619 BUNION: Primary | ICD-10-CM

## 2022-04-20 NOTE — TELEPHONE ENCOUNTER
Caller: DUSTIN TRINIDAD    Relationship: PATIENT    Best call back number: [phone]     What is the medical concern/diagnosis: BUN ION ON RIGHT FOOT    What specialty or service is being requested: PODIATRY  What is the provider, practice or medical service name: [facility]    What is the office location: [place]    Any additional details: PATIENT IS CALLING TO REQUEST AN ANTHEM REFERRAL FOR A PODIATRY APPOINTMENT ON 05/04/22.    PLEASE ADVISE.

## 2022-05-03 RX ORDER — MESALAMINE 250 MG/1
1000 CAPSULE ORAL
Qty: 240 CAPSULE | Refills: 4 | Status: SHIPPED | OUTPATIENT
Start: 2022-05-03 | End: 2022-09-28

## 2022-06-02 ENCOUNTER — OFFICE VISIT (OUTPATIENT)
Dept: FAMILY MEDICINE CLINIC | Facility: CLINIC | Age: 62
End: 2022-06-02

## 2022-06-02 VITALS
BODY MASS INDEX: 31.16 KG/M2 | SYSTOLIC BLOOD PRESSURE: 98 MMHG | RESPIRATION RATE: 18 BRPM | WEIGHT: 187 LBS | HEART RATE: 83 BPM | TEMPERATURE: 97.5 F | OXYGEN SATURATION: 99 % | DIASTOLIC BLOOD PRESSURE: 68 MMHG | HEIGHT: 65 IN

## 2022-06-02 DIAGNOSIS — R11.2 NAUSEA AND VOMITING, UNSPECIFIED VOMITING TYPE: ICD-10-CM

## 2022-06-02 DIAGNOSIS — I10 PRIMARY HYPERTENSION: ICD-10-CM

## 2022-06-02 DIAGNOSIS — R05.9 COUGH: ICD-10-CM

## 2022-06-02 DIAGNOSIS — J06.9 ACUTE URI: ICD-10-CM

## 2022-06-02 LAB
EXPIRATION DATE: NORMAL
FLUAV AG UPPER RESP QL IA.RAPID: NOT DETECTED
FLUBV AG UPPER RESP QL IA.RAPID: NOT DETECTED
INTERNAL CONTROL: NORMAL
Lab: NORMAL
SARS-COV-2 AG UPPER RESP QL IA.RAPID: NOT DETECTED

## 2022-06-02 PROCEDURE — 99214 OFFICE O/P EST MOD 30 MIN: CPT | Performed by: NURSE PRACTITIONER

## 2022-06-02 PROCEDURE — 87428 SARSCOV & INF VIR A&B AG IA: CPT | Performed by: NURSE PRACTITIONER

## 2022-06-02 RX ORDER — DEXTROMETHORPHAN HYDROBROMIDE AND PROMETHAZINE HYDROCHLORIDE 15; 6.25 MG/5ML; MG/5ML
5 SYRUP ORAL 4 TIMES DAILY PRN
Qty: 118 ML | Refills: 0 | Status: SHIPPED | OUTPATIENT
Start: 2022-06-02 | End: 2022-06-16 | Stop reason: SDUPTHER

## 2022-06-02 RX ORDER — ONDANSETRON 4 MG/1
4 TABLET, ORALLY DISINTEGRATING ORAL EVERY 8 HOURS PRN
Qty: 30 TABLET | Refills: 0 | Status: SHIPPED | OUTPATIENT
Start: 2022-06-02 | End: 2022-06-12

## 2022-06-02 NOTE — PROGRESS NOTES
"Chief Complaint  Fever, Cough (tuesday), Headache, and Chills    Subjective          Shannan presents to [facility]  As a 61 year old female c/o fever tmax 101.0 relieved by medication, ha and chills for the past 2 days.  States she has a mild non productive cough.  No sob, no abd pain.  Has had a couple of episodes of nausea.  She is able to eat and drink and keep down fluids  No one around her has been sick  He has been taking otc cough medicine    She has not recently checked her b/p since moving.  Has been taking her b/p medicine as directed.  Denies any ha, blurred vision, dizziness or changes      She has no other c/o today    The following portions of the patient's history were reviewed and updated as appropriate: allergies, current medications, past family history, past medical history, past social history, past surgical history, and problem list    Review of Systems   Constitutional: Positive for chills and fever. Negative for fatigue.   HENT: Negative for congestion, sinus pressure and sore throat.    Eyes: Negative for visual disturbance.   Respiratory: Positive for cough. Negative for shortness of breath and wheezing.    Cardiovascular: Negative for chest pain, palpitations and leg swelling.   Gastrointestinal: Positive for nausea. Negative for abdominal pain, constipation, diarrhea and vomiting.   Neurological: Negative for dizziness and light-headedness.        Objective   Vital Signs:   Vitals:    06/02/22 1112 06/02/22 1133   BP: (!) 80/60 98/68   Pulse: 83    Resp: 18    Temp: 97.5 °F (36.4 °C)    SpO2: 99%    Weight: 84.8 kg (187 lb)    Height: 165 cm (64.96\")         BMI is >= 30 and <= 34.9 (Class 1 obesity). The following options were offered after discussion: weight loss educational material (shared in after visit summary)       Physical Exam  Vitals reviewed.   Constitutional:       General: She is not in acute distress.  Eyes:      Conjunctiva/sclera: Conjunctivae " normal.   Neck:      Thyroid: No thyromegaly.      Vascular: No carotid bruit.   Cardiovascular:      Rate and Rhythm: Normal rate and regular rhythm.      Heart sounds: Normal heart sounds.   Pulmonary:      Effort: Pulmonary effort is normal.      Breath sounds: Normal breath sounds.   Neurological:      Mental Status: She is alert.   Psychiatric:         Attention and Perception: Attention normal.         Mood and Affect: Mood normal.          Result Review :     The following data was reviewed by: RISA Adan on 06/02/2022:      POCT SARS-CoV-2 Antigen KATELYN + Flu (06/02/2022 11:14)  Covid /sars  Not detected     Assessment and Plan    Diagnoses and all orders for this visit:    1. Acute URI  Comments:  Covid negative      -Take all medications as prescribed and until completed.  -Monitor for fever and take Tylenol as needed.  Drink plenty of fluids and get plenty of rest.  -Use cool-mist humidifier  -Seek immediate medical attention for fever unrelieved by Tylenol, chest pain, shortness of breath, sharp back pain, or any other worsening signs or symptoms.  -The patient verbalized understanding of all instructions given today.      2. Cough  Comments:  monitor b/p with medication  Orders:  -     POCT SARS-CoV-2 Antigen KATELYN + Flu  -     promethazine-dextromethorphan (PROMETHAZINE-DM) 6.25-15 MG/5ML syrup; Take 5 mL by mouth 4 (Four) Times a Day As Needed for Cough.  Dispense: 118 mL; Refill: 0    3. Nausea and vomiting, unspecified vomiting type  Comments:  frequent small sips of water  Orders:  -     ondansetron ODT (Zofran ODT) 4 MG disintegrating tablet; Place 1 tablet on the tongue Every 8 (Eight) Hours As Needed for Nausea or Vomiting for up to 10 days.  Dispense: 30 tablet; Refill: 0    4. Primary hypertension  Comments:  Check b/p at home-  hold b/p medication is low-  follow up 1 month-  bring log to next visit  report any changes immediatly        Follow Up   Return in about 1 month (around  7/2/2022), or if symptoms worsen or fail to improve, for Next scheduled follow up recheck b/p.  Patient was given instructions and counseling regarding her condition or for health maintenance advice. Please see specific information pulled into the AVS if appropriate.

## 2022-06-07 ENCOUNTER — TELEPHONE (OUTPATIENT)
Dept: GASTROENTEROLOGY | Facility: CLINIC | Age: 62
End: 2022-06-07

## 2022-06-07 NOTE — TELEPHONE ENCOUNTER
ana Hernandez for 8/19 arrive at 1030/cs,egd- pt already has prep inst advised pt time is subject to change [facility] will call.

## 2022-06-07 NOTE — TELEPHONE ENCOUNTER
----- Message from Rakel Velarde sent at 6/6/2022  5:06 PM EDT -----  Regarding: Rakel Velarde colonoscopy   I’ve been trying to contact your office since last Wednesday in regards to trying to reschedule a colonoscopy thru your phone service. Calls are always dropped and told to try again later. If Dr. Carballo  could contact me I would appreciate that [phone]. Thanks

## 2022-06-16 ENCOUNTER — OFFICE VISIT (OUTPATIENT)
Dept: FAMILY MEDICINE CLINIC | Facility: CLINIC | Age: 62
End: 2022-06-16

## 2022-06-16 VITALS
HEIGHT: 65 IN | RESPIRATION RATE: 16 BRPM | TEMPERATURE: 97.5 F | HEART RATE: 86 BPM | SYSTOLIC BLOOD PRESSURE: 110 MMHG | OXYGEN SATURATION: 98 % | BODY MASS INDEX: 30.99 KG/M2 | DIASTOLIC BLOOD PRESSURE: 74 MMHG | WEIGHT: 186 LBS

## 2022-06-16 DIAGNOSIS — Z78.0 POST-MENOPAUSAL: ICD-10-CM

## 2022-06-16 DIAGNOSIS — R05.9 COUGH: ICD-10-CM

## 2022-06-16 DIAGNOSIS — E55.9 VITAMIN D DEFICIENCY: ICD-10-CM

## 2022-06-16 DIAGNOSIS — L20.84 INTRINSIC ECZEMA: ICD-10-CM

## 2022-06-16 DIAGNOSIS — E78.2 MIXED HYPERLIPIDEMIA: ICD-10-CM

## 2022-06-16 DIAGNOSIS — F41.9 ANXIETY: ICD-10-CM

## 2022-06-16 DIAGNOSIS — I10 PRIMARY HYPERTENSION: Primary | ICD-10-CM

## 2022-06-16 DIAGNOSIS — F33.41 RECURRENT MAJOR DEPRESSIVE DISORDER, IN PARTIAL REMISSION: ICD-10-CM

## 2022-06-16 DIAGNOSIS — K21.9 GASTRO-ESOPHAGEAL REFLUX DISEASE WITHOUT ESOPHAGITIS: ICD-10-CM

## 2022-06-16 DIAGNOSIS — R06.83 SNORES: ICD-10-CM

## 2022-06-16 DIAGNOSIS — Z12.31 ENCOUNTER FOR SCREENING MAMMOGRAM FOR BREAST CANCER: ICD-10-CM

## 2022-06-16 DIAGNOSIS — B00.9 HSV INFECTION: ICD-10-CM

## 2022-06-16 DIAGNOSIS — I10 ESSENTIAL HYPERTENSION: ICD-10-CM

## 2022-06-16 DIAGNOSIS — K51.311 ULCERATIVE RECTOSIGMOIDITIS WITH RECTAL BLEEDING: ICD-10-CM

## 2022-06-16 DIAGNOSIS — L71.0 PERIORAL DERMATITIS: ICD-10-CM

## 2022-06-16 PROCEDURE — 99214 OFFICE O/P EST MOD 30 MIN: CPT | Performed by: PHYSICIAN ASSISTANT

## 2022-06-16 RX ORDER — VALACYCLOVIR HYDROCHLORIDE 500 MG/1
500 TABLET, FILM COATED ORAL DAILY
Qty: 90 TABLET | Refills: 3 | Status: SHIPPED | OUTPATIENT
Start: 2022-06-16

## 2022-06-16 RX ORDER — VALSARTAN 80 MG/1
80 TABLET ORAL DAILY
Qty: 90 TABLET | Refills: 1 | Status: SHIPPED | OUTPATIENT
Start: 2022-06-16 | End: 2022-12-09

## 2022-06-16 RX ORDER — HYDROCHLOROTHIAZIDE 50 MG/1
50 TABLET ORAL DAILY
Qty: 90 TABLET | Refills: 1 | Status: SHIPPED | OUTPATIENT
Start: 2022-06-16 | End: 2022-12-27 | Stop reason: SDUPTHER

## 2022-06-16 RX ORDER — DEXTROMETHORPHAN HYDROBROMIDE AND PROMETHAZINE HYDROCHLORIDE 15; 6.25 MG/5ML; MG/5ML
5 SYRUP ORAL 4 TIMES DAILY PRN
Qty: 118 ML | Refills: 0 | Status: SHIPPED | OUTPATIENT
Start: 2022-06-16 | End: 2023-03-15

## 2022-06-16 RX ORDER — FLUTICASONE PROPIONATE 0.05 %
CREAM (GRAM) TOPICAL 2 TIMES DAILY
Qty: 60 G | Refills: 5 | Status: SHIPPED | OUTPATIENT
Start: 2022-06-16 | End: 2023-03-15

## 2022-06-16 NOTE — PROGRESS NOTES
"Subjective   Rakel Velarde is a 61 y.o. female.     History of Present Illness    Since the last visit, she has overall felt tired.  She has Primary Hypertension and well controlled on current medication, GERD controlled on PPI Rx, Hyperlipidemia and working on this with diet and exercise and Vitamin D deficiency and will update labs for continued management.  she has been compliant with current medications have reviewed them.  The patient denies medication side effects.  Will refill medications. /65 (BP Location: Left arm, Patient Position: Sitting, Cuff Size: Adult)   Pulse 86   Temp 97.5 °F (36.4 °C)   Resp 16   Ht 165 cm (64.96\")   Wt 84.4 kg (186 lb)   LMP  (LMP Unknown)   SpO2 98%   BMI 30.99 kg/m²     Results for orders placed or performed in visit on 06/02/22   POCT SARS-CoV-2 Antigen KATELYN + Flu    Specimen: Swab   Result Value Ref Range    SARS Antigen Not Detected Not Detected, Presumptive Negative    Influenza A Antigen KATELYN Not Detected Not Detected    Influenza B Antigen KATELYN Not Detected Not Detected    Internal Control Passed Passed    Lot Number [id number]     Expiration Date 2,022,023    HSV suppression Valtrex works    Last chol score 4.26%  Need to exercise    PND at night; saw Ramandeep  Refill cream---angular celitis and works PRN  Dr Carballo-----ulcerative rectosigmoiditis   Continue mesalamine  Trial of hyoscyamine as needed for her diarrheal episodes  Rakel Velarde female 61 y.o., /65 (BP Location: Left arm, Patient Position: Sitting, Cuff Size: Adult)   Pulse 86   Temp 97.5 °F (36.4 °C)   Resp 16   Ht 165 cm (64.96\")   Wt 84.4 kg (186 lb)   LMP  (LMP Unknown)   SpO2 98%   BMI 30.99 kg/m²   who presents today for follow up of Depression and Anxiety.  She reports overall Rx works, just had divorce. Ok dose and does not want to change. Onset of symptoms was approximately several years ago. She denies current suicidal and homicidal ideation. Risk factors are family " history of anxiety and or depression and lifestyle of multiple roles.  Previous treatment includes current Rx. She complains of the following medication side effects:none. The patient has previously been in counseling..    '  The following portions of the patient's history were reviewed and updated as appropriate: allergies, current medications, past family history, past medical history, past social history, past surgical history and problem list.    Review of Systems   Constitutional: Positive for fatigue. Negative for fever.   HENT: Negative for nosebleeds and trouble swallowing.    Eyes: Negative for visual disturbance.   Respiratory: Negative for choking and stridor.    Cardiovascular: Negative for chest pain.   Gastrointestinal: Negative for blood in stool.   Endocrine: Negative for polydipsia.   Genitourinary: Negative for genital sores and hematuria.   Musculoskeletal: Negative for joint swelling.   Skin: Negative for color change and rash.   Allergic/Immunologic: Negative for immunocompromised state.   Neurological: Negative for seizures, facial asymmetry and speech difficulty.   Hematological: Negative for adenopathy.   Psychiatric/Behavioral: Negative for behavioral problems, self-injury and suicidal ideas.       Objective   Physical Exam  Vitals and nursing note reviewed.   Constitutional:       General: She is not in acute distress.     Appearance: Normal appearance. She is well-developed. She is not ill-appearing or toxic-appearing.   HENT:      Head: Normocephalic.      Right Ear: External ear normal.      Left Ear: External ear normal.      Nose: Nose normal.      Mouth/Throat:      Pharynx: Oropharynx is clear.   Eyes:      General: No scleral icterus.     Conjunctiva/sclera: Conjunctivae normal.      Pupils: Pupils are equal, round, and reactive to light.   Neck:      Thyroid: No thyromegaly.      Vascular: No carotid bruit.   Cardiovascular:      Rate and Rhythm: Normal rate and regular rhythm.       Heart sounds: Normal heart sounds. No murmur heard.  Pulmonary:      Effort: Pulmonary effort is normal. No respiratory distress.      Breath sounds: Normal breath sounds.   Musculoskeletal:         General: No deformity. Normal range of motion.      Cervical back: Normal range of motion and neck supple.      Right lower leg: No edema.      Left lower leg: No edema.   Skin:     General: Skin is warm and dry.      Findings: No rash.   Neurological:      General: No focal deficit present.      Mental Status: She is alert and oriented to person, place, and time. Mental status is at baseline.   Psychiatric:         Mood and Affect: Mood normal.         Behavior: Behavior normal.         Thought Content: Thought content normal.         Judgment: Judgment normal.         Assessment & Plan   Diagnoses and all orders for this visit:    1. Primary hypertension (Primary)    2. Anxiety    3. Recurrent major depressive disorder, in partial remission (HCC)    4. Gastro-esophageal reflux disease without esophagitis  Comments:  DR Carballo    5. Vitamin D deficiency    6. Ulcerative rectosigmoiditis with rectal bleeding (HCC)  Comments:  DR Carballo    7. Perioral dermatitis    8. Mixed hyperlipidemia    9. Cough  Comments:  monitor b/p with medication  Orders:  -     promethazine-dextromethorphan (PROMETHAZINE-DM) 6.25-15 MG/5ML syrup; Take 5 mL by mouth 4 (Four) Times a Day As Needed for Cough.  Dispense: 118 mL; Refill: 0    10. Snores  -     Ambulatory Referral to Sleep Medicine    11. Encounter for screening mammogram for breast cancer  -     Mammo Screening Digital Tomosynthesis Bilateral With CAD; Future    12. Post-menopausal  -     DEXA Bone Density Axial; Future    13. HSV infection    Other orders  -     valACYclovir (VALTREX) 500 MG tablet; Take 1 tablet by mouth Daily. For suppression  Dispense: 90 tablet; Refill: 3    cont Celexa helping anxiety and depression  Snore--? Apnea  Refill Cutivate cream for intrinsic eczema  works well as needed  HSV suppression Valtrex works  mammo and DEXA screen  Plan, Rakel Velarde, was seen today.  she was seen for HTN and continue medication, GERD and will continue on PPI medication, Hyperlipidemia and will work on this with diet and exercise and Vitamin D deficiency and will update labs .  lauri pap and then refill HRT  Watching K+ and great Dec labs  Sees GI ==Dr Carballo  Mycolog crm works for almita oral dermatitis  Ok refill cough---Promethazine DM  Start exercise     Answers for HPI/ROS submitted by the patient on 6/14/2022  What is the primary reason for your visit?: High Blood Pressure

## 2022-07-26 ENCOUNTER — OFFICE VISIT (OUTPATIENT)
Dept: FAMILY MEDICINE CLINIC | Facility: CLINIC | Age: 62
End: 2022-07-26

## 2022-07-26 VITALS
OXYGEN SATURATION: 99 % | HEIGHT: 65 IN | WEIGHT: 187 LBS | DIASTOLIC BLOOD PRESSURE: 62 MMHG | RESPIRATION RATE: 16 BRPM | TEMPERATURE: 97.5 F | SYSTOLIC BLOOD PRESSURE: 119 MMHG | HEART RATE: 82 BPM | BODY MASS INDEX: 31.16 KG/M2

## 2022-07-26 DIAGNOSIS — Z01.411 ENCOUNTER FOR GYNECOLOGICAL EXAMINATION WITH ABNORMAL FINDING: Primary | ICD-10-CM

## 2022-07-26 PROCEDURE — 99396 PREV VISIT EST AGE 40-64: CPT | Performed by: PHYSICIAN ASSISTANT

## 2022-07-26 RX ORDER — ESTRADIOL 0.5 MG/1
0.5 TABLET ORAL DAILY
Qty: 90 TABLET | Refills: 3 | Status: SHIPPED | OUTPATIENT
Start: 2022-07-26

## 2022-07-26 RX ORDER — PROGESTERONE 100 MG/1
100 CAPSULE ORAL DAILY
Qty: 90 CAPSULE | Refills: 3 | Status: SHIPPED | OUTPATIENT
Start: 2022-07-26

## 2022-07-26 RX ORDER — ESTRADIOL 10 UG/1
1 INSERT VAGINAL 2 TIMES WEEKLY
Qty: 24 TABLET | Refills: 3 | Status: SHIPPED | OUTPATIENT
Start: 2022-07-28

## 2022-07-26 NOTE — PROGRESS NOTES
Subjective   Rakel Velarde is a 62 y.o. female.     History of Present Illness       Rakel Velarde 62 y.o. female is  3, Para 3 who presents for annual exam. The patient has no complaints today. The patient is not sexually active. GYN screening history: last pap: was [id number]. The patient is taking hormone replacement therapy. Patient denies post-menopausal vaginal bleeding.. The patient participates in regular exercise: some.  She reports additional complaints.some dryness  History of abnormal Pap smear: yes - in her 20s had cryo  Family history of uterine or ovarian cancer: no  Family history of colon caner:  no  History of abnormal mammogram: yes - prior bx neg  Family history of breast cancer: yes - mom and aunts (M)  Colonoscopy history:   due  DEXA scan:  Ordered     She declines STD testing.  Hx uterine ablation  She wants to stay HRT  Was seeing Dr Gray     Had breast MRI 11-10-21 neg    The following portions of the patient's history were reviewed and updated as appropriate: allergies, current medications, past family history, past medical history, past social history, past surgical history and problem list.    Review of Systems   Constitutional: Positive for fatigue. Negative for fever.   HENT: Negative for nosebleeds and trouble swallowing.    Eyes: Negative for visual disturbance.   Respiratory: Negative for choking and stridor.    Cardiovascular: Negative for chest pain.   Gastrointestinal: Negative for blood in stool.   Endocrine: Negative for polydipsia.   Genitourinary: Negative for genital sores and hematuria.   Musculoskeletal: Negative for joint swelling.   Skin: Negative for color change and rash.   Allergic/Immunologic: Negative for immunocompromised state.   Neurological: Negative for seizures, facial asymmetry and speech difficulty.   Hematological: Negative for adenopathy.   Psychiatric/Behavioral: Negative for behavioral problems, self-injury and suicidal ideas.       Objective    Physical Exam  Vitals and nursing note reviewed. Exam conducted with a chaperone present.   Constitutional:       Appearance: Normal appearance. She is well-developed.   HENT:      Head: Normocephalic and atraumatic.      Nose: Nose normal.   Eyes:      General: No scleral icterus.     Conjunctiva/sclera: Conjunctivae normal.      Pupils: Pupils are equal, round, and reactive to light.   Neck:      Thyroid: No thyromegaly.   Cardiovascular:      Rate and Rhythm: Normal rate and regular rhythm.      Heart sounds: Normal heart sounds. No murmur heard.    No gallop.   Pulmonary:      Effort: Pulmonary effort is normal.      Breath sounds: Normal breath sounds. No wheezing or rales.   Chest:      Chest wall: No tenderness.   Breasts:      Jamar Score is 5.      Right: Normal. No mass, nipple discharge, skin change or axillary adenopathy.      Left: Normal. No mass, nipple discharge, skin change or axillary adenopathy.       Abdominal:      Palpations: Abdomen is soft.      Tenderness: There is no guarding.      Hernia: There is no hernia in the left inguinal area or right inguinal area.   Genitourinary:     General: Normal vulva.      Exam position: Supine.      Pubic Area: No rash or pubic lice.       Jamar stage (genital): 5.      Labia:         Right: No rash, tenderness, lesion or injury.         Left: No rash, tenderness, lesion or injury.       Urethra: No prolapse, urethral pain, urethral swelling or urethral lesion.      Vagina: Normal. No foreign body. No vaginal discharge, erythema, tenderness, bleeding or lesions.      Cervix: No cervical motion tenderness, discharge or friability.      Uterus: Normal. Not enlarged and not tender.       Adnexa: Right adnexa normal and left adnexa normal.        Right: No mass, tenderness or fullness.          Left: No mass, tenderness or fullness.        Comments: atophy  Musculoskeletal:         General: No deformity.      Cervical back: Normal range of motion and neck  supple.      Right lower leg: No edema.      Left lower leg: No edema.   Lymphadenopathy:      Upper Body:      Right upper body: No axillary adenopathy.      Left upper body: No axillary adenopathy.      Lower Body: No right inguinal adenopathy. No left inguinal adenopathy.   Skin:     General: Skin is warm.      Findings: No rash.   Neurological:      Mental Status: She is alert and oriented to person, place, and time. Mental status is at baseline.   Psychiatric:         Mood and Affect: Mood normal.         Behavior: Behavior normal.         Thought Content: Thought content normal.         Judgment: Judgment normal.         Assessment & Plan   Diagnoses and all orders for this visit:    1. Encounter for gynecological examination with abnormal finding (Primary)  Comments:  atrophy    Other orders  -     estradiol (VAGIFEM) 10 MCG tablet vaginal tablet; Insert 1 tablet into the vagina 2 (Two) Times a Week. For atrophy  Dispense: 24 tablet; Refill: 3  -     estradiol (ESTRACE) 0.5 MG tablet; Take 1 tablet by mouth Daily. For HRT  Dispense: 90 tablet; Refill: 3  -     Progesterone (PROMETRIUM) 100 MG capsule; Take 1 capsule by mouth Daily. When takes Estrace  Dispense: 90 capsule; Refill: 3      Low glycemic index diet  Exercise 30 minutes most days of the week  Make sure you get results on any labs or tests we ordered today  We discussed medications and how to take them as prescribed  Sleep 6-8 hours each night if possible  LDL goal <100    HDL goal >60  Triglyceride goal <150  BP goal =<130/80  Fasting glucose <100  Advise she taper off HRT d/t family hx breast cancer.   Will let her start Vagifem twice weekly for atrophy  Has order mammo and DEXA

## 2022-07-28 LAB
CYTOLOGIST CVX/VAG CYTO: NORMAL
CYTOLOGY CVX/VAG DOC CYTO: NORMAL
CYTOLOGY CVX/VAG DOC THIN PREP: NORMAL
DX ICD CODE: NORMAL
HIV 1 & 2 AB SER-IMP: NORMAL
HPV I/H RISK 4 DNA CVX QL PROBE+SIG AMP: NEGATIVE
OTHER STN SPEC: NORMAL
STAT OF ADQ CVX/VAG CYTO-IMP: NORMAL

## 2022-07-28 RX ORDER — METRONIDAZOLE 7.5 MG/G
GEL VAGINAL NIGHTLY
Qty: 70 G | Refills: 0 | Status: SHIPPED | OUTPATIENT
Start: 2022-07-28 | End: 2022-12-27

## 2022-08-10 ENCOUNTER — OFFICE VISIT (OUTPATIENT)
Dept: SLEEP MEDICINE | Facility: HOSPITAL | Age: 62
End: 2022-08-10

## 2022-08-10 VITALS
DIASTOLIC BLOOD PRESSURE: 61 MMHG | SYSTOLIC BLOOD PRESSURE: 153 MMHG | HEIGHT: 65 IN | OXYGEN SATURATION: 97 % | BODY MASS INDEX: 31.65 KG/M2 | WEIGHT: 190 LBS | HEART RATE: 68 BPM

## 2022-08-10 DIAGNOSIS — G47.63 SLEEP-RELATED BRUXISM: ICD-10-CM

## 2022-08-10 DIAGNOSIS — R06.83 SNORING: ICD-10-CM

## 2022-08-10 DIAGNOSIS — F51.04 PSYCHOPHYSIOLOGICAL INSOMNIA: ICD-10-CM

## 2022-08-10 DIAGNOSIS — G47.10 HYPERSOMNIA: Primary | ICD-10-CM

## 2022-08-10 DIAGNOSIS — G47.8 NON-RESTORATIVE SLEEP: ICD-10-CM

## 2022-08-10 PROCEDURE — 99214 OFFICE O/P EST MOD 30 MIN: CPT | Performed by: FAMILY MEDICINE

## 2022-08-10 PROCEDURE — G0463 HOSPITAL OUTPT CLINIC VISIT: HCPCS

## 2022-08-10 NOTE — PROGRESS NOTES
[facility] New Patient[facility]Consultation       Reason for Consultation: Snores      Patient Care Team:  Alda Concepcion PA-C as PCP - General (Family Medicine)  Maria Antonia Gray MD as Consulting Physician (Obstetrics and Gynecology)  Melanie Carballo MD as Consulting Physician (Gastroenterology)  Carmine Humphreys MD as Surgeon (Orthopedic Surgery)  Mt Cesar MD as Consulting Physician (Sleep Medicine)      History of present illness:  Thank you for asking me to see your patient.  The patient is a 62 y.o. female with hypertension ulcerative colitis anxiety and depression presents today with concern for sleep disorder.  No history of prior sleep study tonsillectomy 1965.  Patient reports snoring morning headaches waking with dry mouth sleep-related bruxism waking at night with GERD sensation nocturia up to 1 time a night more sleepy when she increase her sleep time difficulty falling asleep and staying asleep at night.  There is a family history of sleep apnea.  Obese BMI 31.7.  Attributes insomnia to separation divorce over the past 2 years.  Melatonin helps.    Bedtime midnight sleep latency 1 to 2 hours wake time 9 AM to 10 AM sleeps 9 to 10 hours 1 nap a day no rotating shifts.      Social History: No tobacco use 2 glasses of wine per week 8 caffeinated beverages a day no drug use    Allergies:  Lisinopril    Family History: JOSÉ yes       Current Outpatient Medications:   •  Calcium-Magnesium-Vitamin D (CALCIUM 500 PO), Take by mouth., Disp: , Rfl:   •  Cholecalciferol (VITAMIN D3) 2000 UNITS tablet, Take 2 tablets by mouth daily., Disp: , Rfl:   •  citalopram (CeleXA) 20 MG tablet, Take 1 tablet by mouth Daily. For stress, Disp: 90 tablet, Rfl: 3  •  esomeprazole (nexIUM) 20 MG capsule, Take 20 mg by mouth Every Morning Before Breakfast., Disp: , Rfl:   •  estradiol (ESTRACE) 0.5 MG tablet, Take 1 tablet by mouth Daily. For HRT, Disp: 90 tablet, Rfl: 3  •  estradiol (VAGIFEM) 10 MCG tablet  "vaginal tablet, Insert 1 tablet into the vagina 2 (Two) Times a Week. For atrophy, Disp: 24 tablet, Rfl: 3  •  fluticasone (CUTIVATE) 0.05 % cream, Apply  topically to the appropriate area as directed 2 (Two) Times a Day. To eczema PRN, Disp: 60 g, Rfl: 5  •  hydroCHLOROthiazide (HYDRODIURIL) 50 MG tablet, Take 1 tablet by mouth Daily. for blood pressure, Disp: 90 tablet, Rfl: 1  •  hyoscyamine (LEVSIN) 0.125 MG SL tablet, PLACE 1 TABLET UNDER THE TONGUE EVERY 6 (SIX) HOURS AS NEEDED FOR CRAMPING OR DIARRHEA., Disp: 90 tablet, Rfl: 1  •  metroNIDAZOLE (METROGEL VAGINAL) 0.75 % vaginal gel, Insert  into the vagina Every Night. For 5 days, Disp: 70 g, Rfl: 0  •  nystatin-triamcinolone (MYCOLOG) 493900-5.1 UNIT/GM-% ointment, Apply  topically to the appropriate area as directed 2 (Two) Times a Day. To mouth corners, Disp: 60 g, Rfl: 1  •  Pentasa 250 MG CR capsule, TAKE 4 CAPSULES BY MOUTH 2 (TWO) TIMES A DAY BEFORE MEALS., Disp: 240 capsule, Rfl: 4  •  Progesterone (PROMETRIUM) 100 MG capsule, Take 1 capsule by mouth Daily. When takes Estrace, Disp: 90 capsule, Rfl: 3  •  promethazine-dextromethorphan (PROMETHAZINE-DM) 6.25-15 MG/5ML syrup, Take 5 mL by mouth 4 (Four) Times a Day As Needed for Cough., Disp: 118 mL, Rfl: 0  •  valACYclovir (VALTREX) 500 MG tablet, Take 1 tablet by mouth Daily. For suppression, Disp: 90 tablet, Rfl: 3  •  valsartan (DIOVAN) 80 MG tablet, Take 1 tablet by mouth Daily. For BP, Disp: 90 tablet, Rfl: 1    Vital Signs:    Vitals:    08/10/22 0952   BP: 153/61   Pulse: 68   SpO2: 97%   Weight: 86.2 kg (190 lb)   Height: 165 cm (64.96\")      Body mass index is 31.66 kg/m².  Neck Circumference: 17.25 inches      REVIEW OF SYSTEMS.  Full review of systems available on the intake form which is scanned in the media tab.  The relevant positive are noted below  1. Daytime excessive sleepiness with [place] Sleepiness Scale :Total score: 10   2. Snoring  3. Frequent urination anxiety frequent " "heartburn      Physical exam:  Vitals:    08/10/22 0952   BP: 153/61   Pulse: 68   SpO2: 97%   Weight: 86.2 kg (190 lb)   Height: 165 cm (64.96\")    Body mass index is 31.66 kg/m². Neck Circumference: 17.25 inches  HEENT: Head is atraumatic, normocephalic  Eyes: pupils are round equal and reacting to light and accommodation, conjunctiva normal  NECK:Neck Circumference: 17.25 inches  RESPIRATORY SYSTEM: Regular respirations  CARDIOVASULAR SYSTEM: Regular rate  EXTREMITES: No cyanosis, clubbing  NEUROLOGICAL SYSTEM: Oriented x 3, no gross motor defects, gait normal      Impression:  1. Hypersomnia    2. Non-restorative sleep    3. Snoring    4. Psychophysiological insomnia    5. Sleep-related bruxism        Plan:    Good sleep hygiene measures should be maintained.  Weight loss would be beneficial in this patient who is obese BMI 31.7.    I discussed the pathophysiology of obstructive sleep apnea with the patient.  We discussed the adverse outcomes associated with untreated sleep-disordered breathing.  We discussed treatment modalities of obstructive sleep apnea including CPAP device as well as oral mandibular advancement device. Sleep study will be scheduled to establish definitive diagnosis of sleep disorder breathing.  Weight loss will be strongly beneficial in order to reduce the severity of sleep-disordered breathing.  Patient has narrow oropharyngeal structure.  Caution during activities that require prolonged concentration is strongly advised.  Patient will be notified of sleep study results after sleep study is completed.  If sleep apnea is only mild,  oral mandibular advancement device may be one of the treatment options.  However if sleep apnea is moderately severe, CPAP treatment will be strongly encouraged.  The patient is not opposed to treatment with CPAP device if we confirm significant obstructive sleep apnea on polysomnography.    Also given information on guided meditation with Insight timer " jero.    Thank you for allowing me to participate in your patient's care.    Mt Cesar MD  Sleep Medicine  08/10/22  09:59 EDT

## 2022-08-17 ENCOUNTER — LAB (OUTPATIENT)
Dept: LAB | Facility: HOSPITAL | Age: 62
End: 2022-08-17

## 2022-08-17 DIAGNOSIS — K51.30 ULCERATIVE RECTOSIGMOIDITIS WITHOUT COMPLICATION: ICD-10-CM

## 2022-08-17 DIAGNOSIS — K21.9 GASTRO-ESOPHAGEAL REFLUX DISEASE WITHOUT ESOPHAGITIS: ICD-10-CM

## 2022-08-17 LAB — SARS-COV-2 ORF1AB RESP QL NAA+PROBE: NOT DETECTED

## 2022-08-17 PROCEDURE — C9803 HOPD COVID-19 SPEC COLLECT: HCPCS

## 2022-08-17 PROCEDURE — U0004 COV-19 TEST NON-CDC HGH THRU: HCPCS

## 2022-08-19 ENCOUNTER — ANESTHESIA EVENT (OUTPATIENT)
Dept: GASTROENTEROLOGY | Facility: HOSPITAL | Age: 62
End: 2022-08-19

## 2022-08-19 ENCOUNTER — ANESTHESIA (OUTPATIENT)
Dept: GASTROENTEROLOGY | Facility: HOSPITAL | Age: 62
End: 2022-08-19

## 2022-08-19 ENCOUNTER — HOSPITAL ENCOUNTER (OUTPATIENT)
Facility: HOSPITAL | Age: 62
Setting detail: HOSPITAL OUTPATIENT SURGERY
Discharge: HOME | End: 2022-08-19
Attending: INTERNAL MEDICINE | Admitting: INTERNAL MEDICINE

## 2022-08-19 VITALS
DIASTOLIC BLOOD PRESSURE: 71 MMHG | SYSTOLIC BLOOD PRESSURE: 133 MMHG | TEMPERATURE: 98.4 F | WEIGHT: 183 LBS | OXYGEN SATURATION: 96 % | BODY MASS INDEX: 30.49 KG/M2 | RESPIRATION RATE: 20 BRPM | HEIGHT: 65 IN | HEART RATE: 54 BPM

## 2022-08-19 DIAGNOSIS — K51.30 ULCERATIVE RECTOSIGMOIDITIS WITHOUT COMPLICATION: ICD-10-CM

## 2022-08-19 DIAGNOSIS — K21.9 GASTRO-ESOPHAGEAL REFLUX DISEASE WITHOUT ESOPHAGITIS: ICD-10-CM

## 2022-08-19 PROCEDURE — 45380 COLONOSCOPY AND BIOPSY: CPT | Performed by: INTERNAL MEDICINE

## 2022-08-19 PROCEDURE — 25010000002 PROPOFOL 10 MG/ML EMULSION: Performed by: ANESTHESIOLOGY

## 2022-08-19 PROCEDURE — 43239 EGD BIOPSY SINGLE/MULTIPLE: CPT | Performed by: INTERNAL MEDICINE

## 2022-08-19 PROCEDURE — 88305 TISSUE EXAM BY PATHOLOGIST: CPT | Performed by: INTERNAL MEDICINE

## 2022-08-19 PROCEDURE — S0260 H&P FOR SURGERY: HCPCS | Performed by: INTERNAL MEDICINE

## 2022-08-19 RX ORDER — GLYCOPYRROLATE 0.2 MG/ML
INJECTION INTRAMUSCULAR; INTRAVENOUS AS NEEDED
Status: DISCONTINUED | OUTPATIENT
Start: 2022-08-19 | End: 2022-08-19 | Stop reason: SURG

## 2022-08-19 RX ORDER — PROPOFOL 10 MG/ML
VIAL (ML) INTRAVENOUS AS NEEDED
Status: DISCONTINUED | OUTPATIENT
Start: 2022-08-19 | End: 2022-08-19 | Stop reason: SURG

## 2022-08-19 RX ORDER — LIDOCAINE HYDROCHLORIDE 20 MG/ML
INJECTION, SOLUTION INFILTRATION; PERINEURAL AS NEEDED
Status: DISCONTINUED | OUTPATIENT
Start: 2022-08-19 | End: 2022-08-19 | Stop reason: SURG

## 2022-08-19 RX ORDER — PROPOFOL 10 MG/ML
VIAL (ML) INTRAVENOUS CONTINUOUS PRN
Status: DISCONTINUED | OUTPATIENT
Start: 2022-08-19 | End: 2022-08-19 | Stop reason: SURG

## 2022-08-19 RX ORDER — SODIUM CHLORIDE, SODIUM LACTATE, POTASSIUM CHLORIDE, CALCIUM CHLORIDE 600; 310; 30; 20 MG/100ML; MG/100ML; MG/100ML; MG/100ML
30 INJECTION, SOLUTION INTRAVENOUS CONTINUOUS
Status: DISCONTINUED | OUTPATIENT
Start: 2022-08-19 | End: 2022-08-19 | Stop reason: HOSPADM

## 2022-08-19 RX ADMIN — Medication 100 MG: at 12:00

## 2022-08-19 RX ADMIN — GLYCOPYRROLATE 0.2 MG: 0.2 INJECTION INTRAMUSCULAR; INTRAVENOUS at 11:55

## 2022-08-19 RX ADMIN — PROPOFOL 250 MCG/KG/MIN: 10 INJECTION, EMULSION INTRAVENOUS at 12:00

## 2022-08-19 RX ADMIN — LIDOCAINE HYDROCHLORIDE 60 MG: 20 INJECTION, SOLUTION INFILTRATION; PERINEURAL at 12:00

## 2022-08-19 RX ADMIN — SODIUM CHLORIDE, POTASSIUM CHLORIDE, SODIUM LACTATE AND CALCIUM CHLORIDE 30 ML/HR: 600; 310; 30; 20 INJECTION, SOLUTION INTRAVENOUS at 11:23

## 2022-08-19 NOTE — DISCHARGE INSTRUCTIONS
WHAT ARE DIVERTICULOSIS AND DIVERTICULITIS?  Many people have small pouches in their colons that bulge outward through weak spots, like an inner tube that pokes through weak places in a tire.  Each pouch is called a diverticulum.  The condition of having diverticula is DIVERTICULOSIS.  The condition becomes more common as people age.  About half of all people over the age of 60 have diverticulosis    When pouches become infected or inflamed, the condition is called DIVERTICULITIS.  This happens in 10% to 25% of people with diverticulosis.  Diverticulosis and diverticulitis are also called DIVERTICULAR DISEASE.     WHAT ARE THE SYMPTOMS?  Diverticulosis - Most people do not have any discomfort or symptoms.  However, symptoms may include mild cramps, bloating, and constipation.  Other diseases such as irritable bowel syndrome (IBS) and stomach ulcers cause similar problems, so these symptoms do not always mean a person has diverticulosis.  You should visit your doctor if you have these troubling symptoms.    Diverticulitis - The most common symptom is abdominal pain.  The most common sign is tenderness around the left side of the lower abdomen.  If infection is the cause, fever, nausea, vomiting, chills, cramping, and constipation may occur as well.  The severity depends on the extent of the infection and complications.    WHAT ARE THE COMPLICATIONS?  Diverticulitis can lead to bleeding, infections,perforations or tears, or blockages.  These complications always require treatment to prevent them from proggressing and causing serous illness.    Bleeding from a diverticula is a rare complication.  When this occurs, blood may appear in the toilet or in your stool.  Bleeding can be severe, but it may stop by itself and not require treatment.  Doctors believe bleeding diverticula are caused by a small blood vessel in a diverticulum that weakens and finally bursts.  If you have bleeding from the rectum, you should see your  doctor.  If the bleeding does not stop you may need surgery.    Abscess, Perforation, and Peritonitis - The infection causing diverticulitis often clears up after a few days of treatment with antibiotics.  If the condition gets worse, an abscess may form in the colon.  An abscess is an infected area with pus that may cause swelling and destroy tissue.  Sometimes the infected diverticula may develop small holes, called perforations.  These perforations allow pus to leak out of the colon into the abdominal area.  If the abscess is small and remains in the colon, it may clear up after treatment with antibiotics.  If not, the doctor may need to drain it.  A large abscess can become a serious problem if the infection leaks out and contaminates areas outside the colon.  Infection that spreads into the abdominal cavity is called peritonitis.  Peritonitis requires immediate surgery toclean the abdominal cavity and remove the damaged part of the colon.  Without surgery, peritonitis can be fatal.    FISTULA  A fistula is an abnormal connection of tissue between two organs or between an organ and the skin.  When damaged tissues come into contact with each other during infection, they sometimes stick together.  If they heal that way, a fistula forms.  When diverticulitis-related infection spreads through out the colon, the colon's tissue may stick to nearby tissues.  The organs usually involved are the bladder, small intestine, and skin.  The problem can be corrected with surgery to remove the fistula and affected part of the colon.    INTESTINAL OBSTRUCTION  The scarring caused by infection may cause partial or total blockage of the large intestine.  When this happens, the colon is unable to move bowel contents normally.  When the obstruction totally blocks the intestine, emergency surgery is necessary.  Partial blockage is not an emergency, so the surgery to correct it can be planned.    WHAT CAUSES DIVERTICULAR  DISEASE  Although not proven, the dominant theory is that a low-fiber diet is the main cause of diverticular disease.  The disease was first noticed in the [place] in the early 1900s.  At about the same time, processed foods were introduced into the American diet.  Many processed foods contain refined, low-fiber flour.  Unlike whole-wheat flour, refined flour has no wheat bran.    Diverticular disease is common in developed or industrialized countries-particularly the [place], [place], and [place]-where low-fiber diets are common.  The disease is rare in countries of [place], where people eat high-fiber vegetable diets.    Fiber is the part of fruits, vegetables, and whole grains that the body cannot digest.  Some fiber dissolves easily in water (soluble fiber).  It takes on a soft, jelly-like texture in the intestines.  Some fiber passes almost unchanged through the intestines (insoluble fiber).  Both kinds of fiber help make stools soft and easy to pass.  Fiber also prevents constipation.    Constipation makes the muscles strain to move stool that is too hard.  It is the main cause of increased pressure in the colon.  This excess pressure might cause the weak spots in the colon to bulge out and become diverticula.  Diverticulitis occurs when diverticula become infected or inflamed.  Doctors are not certain what causes the infection.  It may begin when stool or bacteria are caught in the diverticula.  An attack of diverticulitis can develop suddenly and without warning.    HOW DOES THE DOCTOR DIAGNOSE DIVERTICULAR DISEASE  The doctor asks about medical history, does a physical exam, and may perform one or more diagnostic tests.  Because most people do not have symptoms, diverticulosis is often found through tests ordered for another ailment.    When taking a medical history, the doctor may ask about bowel habits, symptoms, pain, diet, and medications.  The physical exam usually involves a  digital rectal exam.  To preform this test. The doctor inserts a gloved, lubricated finger into the rectum to detect tenderness, blockage, or blood.  The doctor may check stool for signs of bleeding and test blood for signs of infection.  The doctor may also order x-rays or other tests.    WHAT IS THE TREATMENT FOR DIVERTICULAR DISEASE  Increasing the amount of fiber in the diet may reduce symptoms of diverticulosis and prevent complications such as diverticulitis.  Fiber keeps stool soft and lowers pressure inside the colon so that bowel contents can move through easily.  The American Dietetic Association. Recommends 20 to 35 grams of fiber each day.  The doctor may also recommend taking a fiber product such as Citrucel or Metamucil once a dya.  These products are mixed water and provide about 2 to 3.5 grams of fiber per  Tablespoon, mixed with 8 ounces of water.    Avoidance of nuts, popcorn, and sunflower, pumpkin, zhen, and sesame seeds has been recommended by physicians out of fear that food particles could enter, block, or irritate the diverticula.  However, no scientific data support this treatment measure.  Eating a high-fiber diet is the only requirement highly emphasized across the medical literature.  Eliminating specific foods is not necessary.  The seeds in tomatoes, zucchini, cucumbers, strawberries, and raspberries, as well as poppy seeds, are generally considered harmless.  People differ in amounts and types of foods the can eat.  Decisions about diet should be made based on what works best for each person.  Keeping a food diary may help identify what foods may cause symptoms.    If cramps, bloating, and constipation are problems, the doctor may prescribe  Short course of pain medication.  However, many medications affect emptying of the colon, an undesirable side effect for people with diverticulosis.    DIVERTICULITIS  Treatment focuses on clearing up the infection and inflammation, resting the  colon, and preventing or minimizing complications.  An attack of diverticulitis without complications may respond to antibiotics within a few days if treated early.  To help the colon rest, the doctor may recommend bed rest and a liquid diet, along with a pain reliever.    An acute attack with severe pain or sever infection may require a hospital stay.  Most acute cases of diverticulitis are treated with antibiotics and a liquid diet.  The antibiotics are given by injection into a vein.  In some cases, however, surgery may be necessary.    WHEN IS SURGERY NECESSARY  If attacks are severe or frequent, the doctor may advise surgery.  The surgeon removes the affected part of the colon and joins the remaining sections.  This typed of surgery, called colon resection, aims to keep attacks from coming back and to prevent complications.  The doctor may also recommend surgery for complications of a fistula or intestinal obstruction.    If antibiotics do not correct an attack, emergency surgery may be required.  Other reasons for emergency surgery include a large abscess, perforation, peritonitis, or continued bleeding.    Emergency surgery usually involves 2 operations.  The first will clear the infected abdominal cavity and remove part of the colon.  Because infection and sometimes obstruction, it is not safe to rejoin the colon during the first operation.  Instead, the surgeon creates a temporary hole, or stoma, in the abdomen.  The end of the colon is connected to the hole, a procedure called a colostomy, to allow normal eating and bowel movements.  The stool goes into a bag attached to the opening in the abdomen.  In the second operation, the surgeon rejoins the ends of the colon.

## 2022-08-19 NOTE — H&P
"[facility]  Pre Procedure History & Physical    Chief Complaint: GERD, family history of esophageal cancer, ulcerative proctitis      HPI: 62-year-old woman here for EGD for symptoms of GERD and family history of esophageal cancer.  She is due for surveillance colonoscopy as well for history of ulcerative proctitis.  She otherwise feels well.    Past Medical History:   Past Medical History:   Diagnosis Date   • Angular cheilitis 01/17/2016   • Anxiety    • Coronary artery disease    • GERD (gastroesophageal reflux disease)    • Hallux valgus     followed by Ortho, Dr. Humphreys   • HSV infection     followed by OB/GYN, Dr. Maria Antonia Gray   • Hyperlipidemia    • Hypertension    • Obesity    • Osteopenia    • Ulcerative colitis (HCC)     followed by Dr. Carballo, Q2 yr colonoscopies   • Vitamin D deficiency        Family History:  Family History   Problem Relation Age of Onset   • Breast cancer Mother 50   • Emphysema Mother    • Macular degeneration Mother    • Hypertension Mother    • Pancreatitis Mother    • Hypertension Father    • Dementia Father    • Parkinsonism Father    • Depression Brother    • Esophageal cancer Brother    • Hypertension Brother    • Melanoma Brother    • Lymphoma Maternal Grandmother    • Macular degeneration Maternal Grandmother    • Macular degeneration Maternal Aunt    • Macular degeneration Maternal Uncle    • Malig Hyperthermia Neg Hx        Social History:   reports that she has never smoked. She has never used smokeless tobacco. She reports current alcohol use of about 4.0 standard drinks of alcohol per week. She reports that she does not use drugs.    Medications:   No medications prior to admission.       Allergies:  Lisinopril    ROS:    Pertinent items are noted in HPI     Objective     Height 165.1 cm (65\"), weight 85.7 kg (189 lb).    Physical Exam   Constitutional: Pt is oriented to person, place, and time and well-developed, well-nourished, and in no " distress.   HENT:   Mouth/Throat: Oropharynx is clear and moist.   Neck: Normal range of motion. Neck supple.   Cardiovascular: Normal rate, regular rhythm and normal heart sounds.    Pulmonary/Chest: Effort normal and breath sounds normal. No respiratory distress. No  wheezes.   Abdominal: Soft. Bowel sounds are normal.   Skin: Skin is warm and dry.   Psychiatric: Mood, memory, affect and judgment normal.     Assessment & Plan     Diagnosis: GERD, family history of esophageal cancer, ulcerative proctitis      Anticipated Surgical Procedure:    Colonoscopy    The risks, benefits, and alternatives of this procedure have been discussed with the patient or the responsible party- the patient understands and agrees to proceed.

## 2022-08-19 NOTE — ANESTHESIA POSTPROCEDURE EVALUATION
Patient: Rakel Velarde    Procedure Summary     Date: 08/19/22 Room / Location:  [facility] /  [facility]    Anesthesia Start: 1156 Anesthesia Stop: 1242    Procedures:       COLONOSCOPY to cecum and TI with biopsies (N/A )      ESOPHAGOGASTRODUODENOSCOPY with biopsies (N/A Esophagus) Diagnosis:       Ulcerative rectosigmoiditis without complication (HCC)      Gastro-esophageal reflux disease without esophagitis      (Ulcerative rectosigmoiditis without complication (HCC) [K51.30])      (Gastro-esophageal reflux disease without esophagitis [K21.9])    Surgeons: Melanie Carballo MD Provider: Fer Hill MD    Anesthesia Type: MAC ASA Status: 3          Anesthesia Type: MAC    Vitals  No vitals data found for the desired time range.          Post Anesthesia Care and Evaluation    Patient location during evaluation: PHASE II  Patient participation: complete - patient participated  Level of consciousness: awake and alert  Pain management: adequate    Airway patency: patent  Anesthetic complications: No anesthetic complications  PONV Status: none  Cardiovascular status: acceptable and hemodynamically stable  Respiratory status: acceptable, nonlabored ventilation and spontaneous ventilation  Hydration status: acceptable

## 2022-08-19 NOTE — ANESTHESIA PREPROCEDURE EVALUATION
Anesthesia Evaluation     NPO Solid Status: > 8 hours             Airway   Mallampati: II  TM distance: >3 FB  Neck ROM: full  no difficulty expected  Dental - normal exam     Pulmonary - normal exam   Cardiovascular - normal exam    (+) hypertension, CAD,       Neuro/Psych  (+) psychiatric history,    GI/Hepatic/Renal/Endo    (+) obesity,       Musculoskeletal     Abdominal  - normal exam   Substance History      OB/GYN          Other                      Anesthesia Plan    ASA 3     MAC       Anesthetic plan, risks, benefits, and alternatives have been provided, discussed and informed consent has been obtained with: patient.        CODE STATUS:

## 2022-08-23 LAB
LAB AP CASE REPORT: NORMAL
PATH REPORT.FINAL DX SPEC: NORMAL
PATH REPORT.GROSS SPEC: NORMAL

## 2022-08-24 NOTE — PROGRESS NOTES
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    Biopsies from her colon showed normal tissue, no evidence of active disease    Continue the Nexium.  This will be a daily long-term medication to prevent progression of the Hernandez's esophagus    Continue the Pentasa    Please place in 3-year EGD and colonoscopy recall

## 2022-08-30 ENCOUNTER — TELEPHONE (OUTPATIENT)
Dept: GASTROENTEROLOGY | Facility: CLINIC | Age: 62
End: 2022-08-30

## 2022-08-30 NOTE — TELEPHONE ENCOUNTER
Call to pt.  Advise per DR Carballo note.  Verb understandig.     EGD and c/s for 8/19/25 placed in recall and HM.

## 2022-08-30 NOTE — TELEPHONE ENCOUNTER
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    Biopsies from her colon showed normal tissue, no evidence of active disease    Continue the Nexium.  This will be a daily long-term medication to prevent progression of the Hernandez's esophagus    Continue the Pentasa    Please place in 3-year EGD and colonoscopy recall

## 2022-09-21 ENCOUNTER — HOSPITAL ENCOUNTER (OUTPATIENT)
Dept: SLEEP MEDICINE | Facility: HOSPITAL | Age: 62
Discharge: HOME | End: 2022-09-21
Admitting: FAMILY MEDICINE

## 2022-09-21 DIAGNOSIS — G47.10 HYPERSOMNIA: ICD-10-CM

## 2022-09-21 DIAGNOSIS — G47.63 SLEEP-RELATED BRUXISM: ICD-10-CM

## 2022-09-21 DIAGNOSIS — G47.8 NON-RESTORATIVE SLEEP: ICD-10-CM

## 2022-09-21 DIAGNOSIS — R06.83 SNORING: ICD-10-CM

## 2022-09-21 DIAGNOSIS — F51.04 PSYCHOPHYSIOLOGICAL INSOMNIA: ICD-10-CM

## 2022-09-21 PROCEDURE — 95806 SLEEP STUDY UNATT&RESP EFFT: CPT | Performed by: FAMILY MEDICINE

## 2022-09-21 PROCEDURE — 95806 SLEEP STUDY UNATT&RESP EFFT: CPT

## 2022-09-28 RX ORDER — MESALAMINE 250 MG/1
1000 CAPSULE ORAL
Qty: 240 CAPSULE | Refills: 4 | Status: SHIPPED | OUTPATIENT
Start: 2022-09-28 | End: 2023-03-03

## 2022-10-10 ENCOUNTER — APPOINTMENT (OUTPATIENT)
Dept: BONE DENSITY | Facility: HOSPITAL | Age: 62
End: 2022-10-10

## 2022-10-10 ENCOUNTER — HOSPITAL ENCOUNTER (OUTPATIENT)
Dept: MAMMOGRAPHY | Facility: HOSPITAL | Age: 62
Discharge: HOME | End: 2022-10-10
Admitting: PHYSICIAN ASSISTANT

## 2022-10-10 DIAGNOSIS — Z12.31 ENCOUNTER FOR SCREENING MAMMOGRAM FOR BREAST CANCER: ICD-10-CM

## 2022-10-10 PROCEDURE — 77063 BREAST TOMOSYNTHESIS BI: CPT

## 2022-10-10 PROCEDURE — 77067 SCR MAMMO BI INCL CAD: CPT

## 2022-10-14 DIAGNOSIS — G47.33 OBSTRUCTIVE SLEEP APNEA: Primary | ICD-10-CM

## 2022-10-17 ENCOUNTER — HOSPITAL ENCOUNTER (OUTPATIENT)
Dept: BONE DENSITY | Facility: HOSPITAL | Age: 62
Discharge: HOME | End: 2022-10-17
Admitting: PHYSICIAN ASSISTANT

## 2022-10-17 DIAGNOSIS — Z78.0 POST-MENOPAUSAL: ICD-10-CM

## 2022-10-17 PROCEDURE — 77080 DXA BONE DENSITY AXIAL: CPT

## 2022-11-01 ENCOUNTER — TELEPHONE (OUTPATIENT)
Dept: SLEEP MEDICINE | Facility: HOSPITAL | Age: 62
End: 2022-11-01

## 2022-12-09 RX ORDER — VALSARTAN 80 MG/1
TABLET ORAL
Qty: 90 TABLET | Refills: 0 | Status: SHIPPED | OUTPATIENT
Start: 2022-12-09 | End: 2022-12-27 | Stop reason: SDUPTHER

## 2022-12-27 ENCOUNTER — OFFICE VISIT (OUTPATIENT)
Dept: FAMILY MEDICINE CLINIC | Facility: CLINIC | Age: 62
End: 2022-12-27

## 2022-12-27 VITALS
HEIGHT: 65 IN | SYSTOLIC BLOOD PRESSURE: 120 MMHG | BODY MASS INDEX: 32.69 KG/M2 | RESPIRATION RATE: 16 BRPM | OXYGEN SATURATION: 99 % | HEART RATE: 86 BPM | DIASTOLIC BLOOD PRESSURE: 70 MMHG | WEIGHT: 196.2 LBS | TEMPERATURE: 97.5 F

## 2022-12-27 DIAGNOSIS — E78.2 MIXED HYPERLIPIDEMIA: ICD-10-CM

## 2022-12-27 DIAGNOSIS — F32.A ANXIETY AND DEPRESSION: ICD-10-CM

## 2022-12-27 DIAGNOSIS — L71.0 PERIORAL DERMATITIS: ICD-10-CM

## 2022-12-27 DIAGNOSIS — F41.9 ANXIETY AND DEPRESSION: ICD-10-CM

## 2022-12-27 DIAGNOSIS — K51.311 ULCERATIVE RECTOSIGMOIDITIS WITH RECTAL BLEEDING: ICD-10-CM

## 2022-12-27 DIAGNOSIS — I10 ESSENTIAL HYPERTENSION: ICD-10-CM

## 2022-12-27 DIAGNOSIS — E55.9 VITAMIN D DEFICIENCY: ICD-10-CM

## 2022-12-27 DIAGNOSIS — L20.84 INTRINSIC ECZEMA: ICD-10-CM

## 2022-12-27 DIAGNOSIS — I10 PRIMARY HYPERTENSION: Primary | ICD-10-CM

## 2022-12-27 PROCEDURE — 99214 OFFICE O/P EST MOD 30 MIN: CPT | Performed by: PHYSICIAN ASSISTANT

## 2022-12-27 PROCEDURE — 93000 ELECTROCARDIOGRAM COMPLETE: CPT | Performed by: PHYSICIAN ASSISTANT

## 2022-12-27 RX ORDER — CITALOPRAM 20 MG/1
20 TABLET ORAL DAILY
Qty: 90 TABLET | Refills: 3 | Status: SHIPPED | OUTPATIENT
Start: 2022-12-27

## 2022-12-27 RX ORDER — VALSARTAN 80 MG/1
80 TABLET ORAL DAILY
Qty: 90 TABLET | Refills: 1 | Status: SHIPPED | OUTPATIENT
Start: 2022-12-27

## 2022-12-27 RX ORDER — HYDROCHLOROTHIAZIDE 50 MG/1
50 TABLET ORAL DAILY
Qty: 90 TABLET | Refills: 1 | Status: SHIPPED | OUTPATIENT
Start: 2022-12-27

## 2022-12-27 NOTE — PROGRESS NOTES
Carlos Velarde is a 62 y.o. female.     History of Present Illness    Since the last visit, she has overall felt well.  She has Primary Hypertension and well controlled on current medication, GERD controlled on PPI Rx, Hyperlipidemia and working on this with diet and exercise and Vitamin D deficiency and labs are at goal >30 ng/mL.  she has been compliant with current medications have reviewed them.  The patient denies medication side effects.  Will refill medications. LMP  (LMP Unknown)   Saw podiatry right side foot pain Dr. Puentes 11/22/2022.----still pain right foot  Weight up    Results for orders placed or performed in visit on 12/01/22   Comprehensive metabolic panel    Specimen: Blood   Result Value Ref Range    Glucose 90 65 - 99 mg/dL    BUN 15 8 - 23 mg/dL    Creatinine 0.72 0.57 - 1.00 mg/dL    EGFR Result 94.1 >60.0 mL/min/1.73    BUN/Creatinine Ratio 20.8 7.0 - 25.0    Sodium 135 (L) 136 - 145 mmol/L    Potassium 3.7 3.5 - 5.2 mmol/L    Chloride 94 (L) 98 - 107 mmol/L    Total CO2 31.0 (H) 22.0 - 29.0 mmol/L    Calcium 10.0 8.6 - 10.5 mg/dL    Total Protein 7.2 6.0 - 8.5 g/dL    Albumin 4.20 3.50 - 5.20 g/dL    Globulin 3.0 gm/dL    A/G Ratio 1.4 g/dL    Total Bilirubin 0.4 0.0 - 1.2 mg/dL    Alkaline Phosphatase 71 39 - 117 U/L    AST (SGOT) 13 1 - 32 U/L    ALT (SGPT) 20 1 - 33 U/L   Lipid panel    Specimen: Blood   Result Value Ref Range    Total Cholesterol 214 (H) 0 - 200 mg/dL    Triglycerides 177 (H) 0 - 150 mg/dL    HDL Cholesterol 53 40 - 60 mg/dL    VLDL Cholesterol Evgeny 31 5 - 40 mg/dL    LDL Chol Calc (NIH) 130 (H) 0 - 100 mg/dL   TSH    Specimen: Blood   Result Value Ref Range    TSH 3.080 0.270 - 4.200 uIU/mL   T4, Free    Specimen: Blood   Result Value Ref Range    Free T4 1.06 0.93 - 1.70 ng/dL   T3, Free    Specimen: Blood   Result Value Ref Range    T3, Free 3.1 2.0 - 4.4 pg/mL   Vitamin D 25 Hydroxy    Specimen: Blood   Result Value Ref Range    25 Hydroxy, Vitamin D 60.7  30.0 - 100.0 ng/mL   Vitamin B12    Specimen: Blood   Result Value Ref Range    Vitamin B-12 497 211 - 946 pg/mL   Folate    Specimen: Blood   Result Value Ref Range    Folate 10.20 4.78 - 24.20 ng/mL   Magnesium    Specimen: Blood   Result Value Ref Range    Magnesium 1.7 1.6 - 2.4 mg/dL   Microscopic Examination -   Result Value Ref Range    WBC, UA 0-2 /HPF    RBC, UA Comment /HPF    Epithelial Cells (non renal) 3-6 (A) /HPF    Cast Type Comment     Bacteria, UA Comment None Seen /HPF   CBC and Differential    Specimen: Blood   Result Value Ref Range    WBC 7.28 3.40 - 10.80 10*3/mm3    RBC 4.15 3.77 - 5.28 10*6/mm3    Hemoglobin 13.0 12.0 - 15.9 g/dL    Hematocrit 38.9 34.0 - 46.6 %    MCV 93.7 79.0 - 97.0 fL    MCH 31.3 26.6 - 33.0 pg    MCHC 33.4 31.5 - 35.7 g/dL    RDW 12.9 12.3 - 15.4 %    Platelets 280 140 - 450 10*3/mm3    Neutrophil Rel % 46.2 42.7 - 76.0 %    Lymphocyte Rel % 44.6 19.6 - 45.3 %    Monocyte Rel % 6.3 5.0 - 12.0 %    Eosinophil Rel % 1.9 0.3 - 6.2 %    Basophil Rel % 0.7 0.0 - 1.5 %    Neutrophils Absolute 3.36 1.70 - 7.00 10*3/mm3    Lymphocytes Absolute 3.25 (H) 0.70 - 3.10 10*3/mm3    Monocytes Absolute 0.46 0.10 - 0.90 10*3/mm3    Eosinophils Absolute 0.14 0.00 - 0.40 10*3/mm3    Basophils Absolute 0.05 0.00 - 0.20 10*3/mm3    Immature Granulocyte Rel % 0.3 0.0 - 0.5 %    Immature Grans Absolute 0.02 0.00 - 0.05 10*3/mm3    nRBC 0.0 0.0 - 0.2 /100 WBC   Urinalysis With Microscopic - Urine, Clean Catch    Specimen: Urine, Clean Catch   Result Value Ref Range    Specific Gravity, UA 1.019 1.005 - 1.030    pH, UA 7.0 5.0 - 8.0    Color, UA Yellow     Appearance, UA Cloudy (A) Clear    Leukocytes, UA Negative Negative    Protein Negative Negative    Glucose, UA Negative Negative    Ketones Trace (A) Negative    Blood, UA Negative Negative    Bilirubin, UA Negative Negative    Urobilinogen, UA Comment     Nitrite, UA Negative Negative   Seeing sleep medicine Dr. Cesar--- admit of obstructive  sleep apnea on CPAP.  Colon cancer screening Dr. Carballo 8/19/2022--- patient on Pentasa for treatment of ulcerative colitis noted stable GERD on PPI.  Last visit was 3/31/2022 in office.  Last visit with me was 7/26/2022 for routine GYN prevention exam.  And refilled HRT per patient request.  Last regular office visit was 6/16/2022 following up on hypertension, primary, mixed hyperlipidemia--- noting her history of GERD.  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  On Cutivate cream as needed intrinsic eczema works well.  Also noted continue treatment for anxiety and depression with generic Celexa  She is to start Vagifem------ok with Estrace PO  The 10-year ASCVD risk score (Cesilia WYATT, et al., 2019) is: 5.8%    Values used to calculate the score:      Age: 62 years      Sex: Female      Is Non- : No      Diabetic: No      Tobacco smoker: No      Systolic Blood Pressure: 121 mmHg      Is BP treated: Yes      HDL Cholesterol: 53 mg/dL      Total Cholesterol: 214 mg/dL  Normal mammogram and normal bone density this time it was months 10/17/2022  The following portions of the patient's history were reviewed and updated as appropriate: allergies, current medications, past family history, past medical history, past social history, past surgical history and problem list.    Review of Systems   Constitutional: Negative for activity change, appetite change and unexpected weight change.   HENT: Negative for nosebleeds and trouble swallowing.    Eyes: Negative for pain and visual disturbance.   Respiratory: Negative for chest tightness, shortness of breath and wheezing.    Cardiovascular: Negative for chest pain and palpitations.   Gastrointestinal: Negative for abdominal pain and blood in stool.   Endocrine: Negative.    Genitourinary: Negative for difficulty urinating and hematuria.   Musculoskeletal: Negative for joint swelling.   Skin: Negative for color  change and rash.   Allergic/Immunologic: Negative.    Neurological: Negative for syncope and speech difficulty.   Hematological: Negative for adenopathy.   Psychiatric/Behavioral: Negative for agitation and confusion.   All other systems reviewed and are negative.      Objective   Physical Exam  Vitals and nursing note reviewed.   Constitutional:       General: She is not in acute distress.     Appearance: She is well-developed. She is not ill-appearing or toxic-appearing.   HENT:      Head: Normocephalic.      Right Ear: External ear normal.      Left Ear: External ear normal.      Nose: Nose normal.      Mouth/Throat:      Pharynx: Oropharynx is clear.   Eyes:      General: No scleral icterus.     Conjunctiva/sclera: Conjunctivae normal.      Pupils: Pupils are equal, round, and reactive to light.   Neck:      Thyroid: No thyromegaly.      Vascular: No carotid bruit.   Cardiovascular:      Rate and Rhythm: Normal rate and regular rhythm.      Heart sounds: Normal heart sounds. No murmur heard.  Pulmonary:      Effort: Pulmonary effort is normal. No respiratory distress.      Breath sounds: Normal breath sounds.   Musculoskeletal:         General: No deformity. Normal range of motion.      Cervical back: Normal range of motion and neck supple.   Skin:     General: Skin is warm and dry.      Findings: No rash.   Neurological:      General: No focal deficit present.      Mental Status: She is alert and oriented to person, place, and time. Mental status is at baseline.   Psychiatric:         Mood and Affect: Mood normal.         Behavior: Behavior normal.         Thought Content: Thought content normal.         Judgment: Judgment normal.         Assessment & Plan   Diagnoses and all orders for this visit:    1. Primary hypertension (Primary)  Comments:  Continue valsartan and hydrochlorothiazide working well blood pressure at goal    2. Mixed hyperlipidemia  Comments:  Cholesterol score 5.8% continue diet and  exercise    3. Anxiety and depression  Comments:  Continue Celexa working well    4. Vitamin D deficiency    5. Perioral dermatitis  Comments:  Continue Mycolog ointment working well as needed    6. 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 Carballo GI    Plan, Rakel Velarde, was seen today.  she was seen for HTN and continue medication, GERD and will continue on PPI medication, Hyperlipidemia and will work on this with diet and exercise and Vitamin D deficiency and supplemented.  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  On Cutivate cream as needed intrinsic eczema works well.  Also noted continue treatment for anxiety and depression with generic Celexa  Cont Cpap----DR Cesar  Patient desires to continue HRT and will start the Vagifem for atrophy treatment  Sees Dr. Carballo for GERD and management of ulcerative colitis stable on medications  Chol score 5.8%  Valtrex works for suppression HSV  Had DEXA 10/17/2022 and this 1 was normal  Update EKG

## 2022-12-27 NOTE — PROGRESS NOTES
Procedure     ECG 12 Lead    Date/Time: 12/27/2022 9:48 AM  Performed by: Alda Concepcion PA-C  Authorized by: Alda Concepcion PA-C   Previous ECG: no previous ECG available  Rhythm: sinus rhythm  Rate: normal  BPM: 65  Conduction: conduction normal  ST Segments: ST segments normal  T Waves: T waves normal  QRS axis: normal  Other: no other findings    Clinical impression: normal ECG  Comments: EKG Interpretation Report    Heart rate:    65 beats/min, LA interval:  158 msec, QRS duration:  96 msec  QTu:448 msec, QTc:  466 msec

## 2023-01-11 ENCOUNTER — OFFICE VISIT (OUTPATIENT)
Dept: SLEEP MEDICINE | Facility: HOSPITAL | Age: 63
End: 2023-01-11
Payer: MEDICAID

## 2023-01-11 VITALS — OXYGEN SATURATION: 96 % | WEIGHT: 190.2 LBS | HEART RATE: 86 BPM | BODY MASS INDEX: 31.69 KG/M2 | HEIGHT: 65 IN

## 2023-01-11 DIAGNOSIS — G47.33 OBSTRUCTIVE SLEEP APNEA: Primary | ICD-10-CM

## 2023-01-11 DIAGNOSIS — E66.9 OBESITY (BMI 30-39.9): ICD-10-CM

## 2023-01-11 PROCEDURE — 99213 OFFICE O/P EST LOW 20 MIN: CPT | Performed by: FAMILY MEDICINE

## 2023-01-11 PROCEDURE — G0463 HOSPITAL OUTPT CLINIC VISIT: HCPCS

## 2023-01-11 NOTE — PROGRESS NOTES
"Follow Up [facility] Note     Chief Complaint:  JOSÉ     Primary Care Physician: Alda Concepcion, GRANT    Interval History:   The patient is a 62 y.o. female  who was last seen in the sleep lab: 9/21/2022 for home sleep study which showed overall AHI of 16 events per hour lowest SPO2 82%.  Advised auto CPAP 6-18 cm H2O.  Reports bedtime 12 AM wake time 9 AM.  Nasal mask fits well does not leak air no dry mouth.  SAKSHI 3.3.  Can take up to 6 months to resolve.  Some improvement in hypersomnia nonrestorative sleep.  Currently wears a mouthguard for teeth grinding interested in oral mandibular device instead of Pap.    Downloaded PAP Data Reviewed For Compliance:  DME is [employer].  Downloads between 12/11/2022 - 1/9/2023.  Average usage is 7 hours 49 minutes.  Average AHI is 4.4.  Average auto CPAP pressure is 11.1 cm H2O    I have reviewed the above results and compared them with the patient's last downloads and reviewed with the patient.    Review of Systems:    A complete review of systems was done and all were negative with the exception of all negative    Social History:    Social History     Socioeconomic History   • Marital status:    Tobacco Use   • Smoking status: Never   • Smokeless tobacco: Never   Vaping Use   • Vaping Use: Never used   Substance and Sexual Activity   • Alcohol use: Yes     Alcohol/week: 4.0 standard drinks     Types: 4 Glasses of wine per week     Comment: socially   • Drug use: No   • Sexual activity: Not Currently     Partners: Male     Birth control/protection: Post-menopausal       Allergies:  Lisinopril     Medication Review:  Reviewed.      Vital Signs:    Vitals:    01/11/23 1500   Pulse: 86   SpO2: 96%   Weight: 86.3 kg (190 lb 3.2 oz)   Height: 165.1 cm (65\")     Body mass index is 31.65 kg/m².    Physical Exam:    Constitutional:  Well developed 62 y.o. female that appears in no apparent distress.  Awake & oriented times 3.  Normal mood with normal recent and " remote memory and normal judgement.  Eyes:  Conjunctivae normal.  Oropharynx: Previously, moist mucous membranes, patient is wearing a facemask.    Self-administered [place] Sleepiness Scale test results: 2  0-5 Lower normal daytime sleepiness  6-10 Higher normal daytime sleepiness  11-12 Mild, 13-15 Moderate, & 16-24 Severe excessive daytime sleepiness    Impression:   1. Obstructive sleep apnea    2. Obesity (BMI 30-39.9)        Obstructive sleep apnea adequately treated with auto CPAP 6-18 cm H2O. The patient appears to be at goal with good compliance and usage. The patient has no complaints of hypersomnolence.    Plan:  Good sleep hygiene measures should be maintained.  Weight loss would be beneficial in this patient who will be by Body mass index is 31.65 kg/m²..      After evaluating the patient and assessing results available, the patient is benefiting from the treatment being provided.     The patient will continue his auto CPAP 6-18 cm H2O.  After clinical evaluation and review of downloads, I recommend no changes to the patient's pressures.  A new prescription will be sent to the patient's DME.  Patient to talk to DME about mask fitting to consider nasal cushion mask.    Will also place referral to dental sleep specialist to consider oral mandibular device instead.    Caution during activities that require prolonged concentration is strongly advised if sleepiness returns. Changing of PAP supplies regularly is important for effective use. Patient needs to change cushion on the mask or plugs on nasal pillows along with disposable filters once every month and change mask frame, tubing, headgear and Velcro straps every 6 months at the minimum.    I answered all of the patient's questions.  The patient will call for any problems and will follow up in 6 months.      Mt Cesar MD  Sleep Medicine  01/11/23  15:47 EST

## 2023-01-12 RX ORDER — METRONIDAZOLE 7.5 MG/G
GEL VAGINAL NIGHTLY
Qty: 70 G | Refills: 0 | Status: SHIPPED | OUTPATIENT
Start: 2023-01-12 | End: 2023-03-15

## 2023-02-01 ENCOUNTER — APPOINTMENT (OUTPATIENT)
Dept: BONE DENSITY | Facility: HOSPITAL | Age: 63
End: 2023-02-01

## 2023-03-03 RX ORDER — MESALAMINE 250 MG/1
CAPSULE ORAL
Qty: 240 CAPSULE | Refills: 4 | Status: SHIPPED | OUTPATIENT
Start: 2023-03-03

## 2023-03-15 ENCOUNTER — APPOINTMENT (OUTPATIENT)
Dept: CT IMAGING | Facility: HOSPITAL | Age: 63
End: 2023-03-15
Payer: MEDICAID

## 2023-03-15 ENCOUNTER — HOSPITAL ENCOUNTER (OUTPATIENT)
Facility: HOSPITAL | Age: 63
Discharge: HOME | End: 2023-03-18
Attending: EMERGENCY MEDICINE | Admitting: SURGERY
Payer: MEDICAID

## 2023-03-15 DIAGNOSIS — E66.9 OBESITY (BMI 30-39.9): ICD-10-CM

## 2023-03-15 DIAGNOSIS — K35.80 ACUTE APPENDICITIS, UNSPECIFIED ACUTE APPENDICITIS TYPE: Primary | ICD-10-CM

## 2023-03-15 DIAGNOSIS — I10 PRIMARY HYPERTENSION: ICD-10-CM

## 2023-03-15 DIAGNOSIS — K35.80 ACUTE APPENDICITIS: ICD-10-CM

## 2023-03-15 LAB
ALBUMIN SERPL-MCNC: 4.3 G/DL (ref 3.5–5.2)
ALBUMIN/GLOB SERPL: 1.2 G/DL
ALP SERPL-CCNC: 76 U/L (ref 39–117)
ALT SERPL W P-5'-P-CCNC: 19 U/L (ref 1–33)
AMORPH URATE CRY URNS QL MICRO: ABNORMAL /HPF
ANION GAP SERPL CALCULATED.3IONS-SCNC: 13 MMOL/L (ref 5–15)
AST SERPL-CCNC: 16 U/L (ref 1–32)
BACTERIA UR QL AUTO: ABNORMAL /HPF
BASOPHILS # BLD AUTO: 0.05 10*3/MM3 (ref 0–0.2)
BASOPHILS NFR BLD AUTO: 0.4 % (ref 0–1.5)
BILIRUB SERPL-MCNC: 0.4 MG/DL (ref 0–1.2)
BILIRUB UR QL STRIP: NEGATIVE
BUN SERPL-MCNC: 11 MG/DL (ref 8–23)
BUN/CREAT SERPL: 15.5 (ref 7–25)
CALCIUM SPEC-SCNC: 9.7 MG/DL (ref 8.6–10.5)
CHLORIDE SERPL-SCNC: 97 MMOL/L (ref 98–107)
CLARITY UR: ABNORMAL
CO2 SERPL-SCNC: 28 MMOL/L (ref 22–29)
COLOR UR: YELLOW
CREAT SERPL-MCNC: 0.71 MG/DL (ref 0.57–1)
DEPRECATED RDW RBC AUTO: 41.9 FL (ref 37–54)
EGFRCR SERPLBLD CKD-EPI 2021: 95.7 ML/MIN/1.73
EOSINOPHIL # BLD AUTO: 0.11 10*3/MM3 (ref 0–0.4)
EOSINOPHIL NFR BLD AUTO: 0.9 % (ref 0.3–6.2)
ERYTHROCYTE [DISTWIDTH] IN BLOOD BY AUTOMATED COUNT: 12.8 % (ref 12.3–15.4)
GLOBULIN UR ELPH-MCNC: 3.7 GM/DL
GLUCOSE SERPL-MCNC: 101 MG/DL (ref 65–99)
GLUCOSE UR STRIP-MCNC: NEGATIVE MG/DL
HCT VFR BLD AUTO: 40.6 % (ref 34–46.6)
HGB BLD-MCNC: 13.8 G/DL (ref 12–15.9)
HGB UR QL STRIP.AUTO: ABNORMAL
HOLD SPECIMEN: NORMAL
HOLD SPECIMEN: NORMAL
HYALINE CASTS UR QL AUTO: ABNORMAL /LPF
IMM GRANULOCYTES # BLD AUTO: 0.04 10*3/MM3 (ref 0–0.05)
IMM GRANULOCYTES NFR BLD AUTO: 0.3 % (ref 0–0.5)
KETONES UR QL STRIP: NEGATIVE
LEUKOCYTE ESTERASE UR QL STRIP.AUTO: ABNORMAL
LIPASE SERPL-CCNC: 17 U/L (ref 13–60)
LYMPHOCYTES # BLD AUTO: 2.87 10*3/MM3 (ref 0.7–3.1)
LYMPHOCYTES NFR BLD AUTO: 24.1 % (ref 19.6–45.3)
MCH RBC QN AUTO: 31 PG (ref 26.6–33)
MCHC RBC AUTO-ENTMCNC: 34 G/DL (ref 31.5–35.7)
MCV RBC AUTO: 91.2 FL (ref 79–97)
MONOCYTES # BLD AUTO: 0.59 10*3/MM3 (ref 0.1–0.9)
MONOCYTES NFR BLD AUTO: 5 % (ref 5–12)
NEUTROPHILS NFR BLD AUTO: 69.3 % (ref 42.7–76)
NEUTROPHILS NFR BLD AUTO: 8.25 10*3/MM3 (ref 1.7–7)
NITRITE UR QL STRIP: NEGATIVE
NRBC BLD AUTO-RTO: 0 /100 WBC (ref 0–0.2)
PH UR STRIP.AUTO: 8 [PH] (ref 5–8)
PLATELET # BLD AUTO: 296 10*3/MM3 (ref 140–450)
PMV BLD AUTO: 10.3 FL (ref 6–12)
POTASSIUM SERPL-SCNC: 3.7 MMOL/L (ref 3.5–5.2)
PROT SERPL-MCNC: 8 G/DL (ref 6–8.5)
PROT UR QL STRIP: NEGATIVE
QT INTERVAL: 435 MS
RBC # BLD AUTO: 4.45 10*6/MM3 (ref 3.77–5.28)
RBC # UR STRIP: ABNORMAL /HPF
REF LAB TEST METHOD: ABNORMAL
SODIUM SERPL-SCNC: 138 MMOL/L (ref 136–145)
SP GR UR STRIP: 1.02 (ref 1–1.03)
SQUAMOUS #/AREA URNS HPF: ABNORMAL /HPF
TROPONIN T SERPL HS-MCNC: 8 NG/L
UROBILINOGEN UR QL STRIP: ABNORMAL
WBC # UR STRIP: ABNORMAL /HPF
WBC NRBC COR # BLD: 11.91 10*3/MM3 (ref 3.4–10.8)
WHOLE BLOOD HOLD COAG: NORMAL
WHOLE BLOOD HOLD SPECIMEN: NORMAL

## 2023-03-15 PROCEDURE — 93010 ELECTROCARDIOGRAM REPORT: CPT | Performed by: INTERNAL MEDICINE

## 2023-03-15 PROCEDURE — G0378 HOSPITAL OBSERVATION PER HR: HCPCS

## 2023-03-15 PROCEDURE — 25010000002 MORPHINE PER 10 MG: Performed by: EMERGENCY MEDICINE

## 2023-03-15 PROCEDURE — 83690 ASSAY OF LIPASE: CPT

## 2023-03-15 PROCEDURE — 84484 ASSAY OF TROPONIN QUANT: CPT

## 2023-03-15 PROCEDURE — 25010000002 PIPERACILLIN SOD-TAZOBACTAM PER 1 G: Performed by: EMERGENCY MEDICINE

## 2023-03-15 PROCEDURE — 81001 URINALYSIS AUTO W/SCOPE: CPT | Performed by: EMERGENCY MEDICINE

## 2023-03-15 PROCEDURE — 93005 ELECTROCARDIOGRAM TRACING: CPT

## 2023-03-15 PROCEDURE — 99285 EMERGENCY DEPT VISIT HI MDM: CPT

## 2023-03-15 PROCEDURE — 80053 COMPREHEN METABOLIC PANEL: CPT

## 2023-03-15 PROCEDURE — 85025 COMPLETE CBC W/AUTO DIFF WBC: CPT

## 2023-03-15 PROCEDURE — 25010000002 ONDANSETRON PER 1 MG: Performed by: EMERGENCY MEDICINE

## 2023-03-15 PROCEDURE — 96365 THER/PROPH/DIAG IV INF INIT: CPT

## 2023-03-15 PROCEDURE — 96376 TX/PRO/DX INJ SAME DRUG ADON: CPT

## 2023-03-15 PROCEDURE — 96375 TX/PRO/DX INJ NEW DRUG ADDON: CPT

## 2023-03-15 PROCEDURE — 25510000001 IOPAMIDOL 61 % SOLUTION: Performed by: EMERGENCY MEDICINE

## 2023-03-15 PROCEDURE — 99284 EMERGENCY DEPT VISIT MOD MDM: CPT

## 2023-03-15 PROCEDURE — 93005 ELECTROCARDIOGRAM TRACING: CPT | Performed by: EMERGENCY MEDICINE

## 2023-03-15 PROCEDURE — 36415 COLL VENOUS BLD VENIPUNCTURE: CPT

## 2023-03-15 PROCEDURE — 74177 CT ABD & PELVIS W/CONTRAST: CPT

## 2023-03-15 RX ORDER — MORPHINE SULFATE 2 MG/ML
4 INJECTION, SOLUTION INTRAMUSCULAR; INTRAVENOUS ONCE
Status: COMPLETED | OUTPATIENT
Start: 2023-03-15 | End: 2023-03-15

## 2023-03-15 RX ORDER — ONDANSETRON 2 MG/ML
4 INJECTION INTRAMUSCULAR; INTRAVENOUS ONCE
Status: COMPLETED | OUTPATIENT
Start: 2023-03-15 | End: 2023-03-15

## 2023-03-15 RX ORDER — SODIUM CHLORIDE 0.9 % (FLUSH) 0.9 %
10 SYRINGE (ML) INJECTION AS NEEDED
Status: DISCONTINUED | OUTPATIENT
Start: 2023-03-15 | End: 2023-03-16

## 2023-03-15 RX ORDER — FLUTICASONE PROPIONATE 0.05 %
CREAM (GRAM) TOPICAL
Qty: 60 G | Refills: 0 | Status: SHIPPED | OUTPATIENT
Start: 2023-03-15

## 2023-03-15 RX ADMIN — MORPHINE SULFATE 4 MG: 2 INJECTION, SOLUTION INTRAMUSCULAR; INTRAVENOUS at 20:35

## 2023-03-15 RX ADMIN — MORPHINE SULFATE 4 MG: 2 INJECTION, SOLUTION INTRAMUSCULAR; INTRAVENOUS at 22:40

## 2023-03-15 RX ADMIN — ONDANSETRON 4 MG: 2 INJECTION INTRAMUSCULAR; INTRAVENOUS at 20:26

## 2023-03-15 RX ADMIN — IOPAMIDOL 85 ML: 612 INJECTION, SOLUTION INTRAVENOUS at 21:17

## 2023-03-15 RX ADMIN — TAZOBACTAM SODIUM AND PIPERACILLIN SODIUM 3.38 G: 375; 3 INJECTION, SOLUTION INTRAVENOUS at 22:40

## 2023-03-15 NOTE — ED TRIAGE NOTES
.Pt masked on arrival, staff masked    Pt reports upper abd pain, back pain, reports hx of ulcerative colitis, feels bloated, started last night

## 2023-03-16 ENCOUNTER — ANESTHESIA (OUTPATIENT)
Dept: PERIOP | Facility: HOSPITAL | Age: 63
End: 2023-03-16
Payer: MEDICAID

## 2023-03-16 ENCOUNTER — ANESTHESIA EVENT (OUTPATIENT)
Dept: PERIOP | Facility: HOSPITAL | Age: 63
End: 2023-03-16
Payer: MEDICAID

## 2023-03-16 PROBLEM — K35.80 ACUTE APPENDICITIS, UNSPECIFIED ACUTE APPENDICITIS TYPE: Status: RESOLVED | Noted: 2023-03-15 | Resolved: 2023-03-16

## 2023-03-16 LAB
ANION GAP SERPL CALCULATED.3IONS-SCNC: 8.1 MMOL/L (ref 5–15)
BASOPHILS # BLD AUTO: 0.06 10*3/MM3 (ref 0–0.2)
BASOPHILS NFR BLD AUTO: 0.4 % (ref 0–1.5)
BUN SERPL-MCNC: 12 MG/DL (ref 8–23)
BUN/CREAT SERPL: 15.6 (ref 7–25)
CALCIUM SPEC-SCNC: 9.4 MG/DL (ref 8.6–10.5)
CHLORIDE SERPL-SCNC: 99 MMOL/L (ref 98–107)
CO2 SERPL-SCNC: 27.9 MMOL/L (ref 22–29)
CREAT SERPL-MCNC: 0.77 MG/DL (ref 0.57–1)
DEPRECATED RDW RBC AUTO: 42.1 FL (ref 37–54)
EGFRCR SERPLBLD CKD-EPI 2021: 86.8 ML/MIN/1.73
EOSINOPHIL # BLD AUTO: 0.1 10*3/MM3 (ref 0–0.4)
EOSINOPHIL NFR BLD AUTO: 0.7 % (ref 0.3–6.2)
ERYTHROCYTE [DISTWIDTH] IN BLOOD BY AUTOMATED COUNT: 12.8 % (ref 12.3–15.4)
GLUCOSE SERPL-MCNC: 94 MG/DL (ref 65–99)
HCT VFR BLD AUTO: 36.2 % (ref 34–46.6)
HGB BLD-MCNC: 12.5 G/DL (ref 12–15.9)
IMM GRANULOCYTES # BLD AUTO: 0.06 10*3/MM3 (ref 0–0.05)
IMM GRANULOCYTES NFR BLD AUTO: 0.4 % (ref 0–0.5)
LYMPHOCYTES # BLD AUTO: 3.07 10*3/MM3 (ref 0.7–3.1)
LYMPHOCYTES NFR BLD AUTO: 20 % (ref 19.6–45.3)
MCH RBC QN AUTO: 31.5 PG (ref 26.6–33)
MCHC RBC AUTO-ENTMCNC: 34.5 G/DL (ref 31.5–35.7)
MCV RBC AUTO: 91.2 FL (ref 79–97)
MONOCYTES # BLD AUTO: 0.99 10*3/MM3 (ref 0.1–0.9)
MONOCYTES NFR BLD AUTO: 6.5 % (ref 5–12)
NEUTROPHILS NFR BLD AUTO: 11.06 10*3/MM3 (ref 1.7–7)
NEUTROPHILS NFR BLD AUTO: 72 % (ref 42.7–76)
NRBC BLD AUTO-RTO: 0 /100 WBC (ref 0–0.2)
PLATELET # BLD AUTO: 257 10*3/MM3 (ref 140–450)
PMV BLD AUTO: 10.7 FL (ref 6–12)
POTASSIUM SERPL-SCNC: 3.8 MMOL/L (ref 3.5–5.2)
RBC # BLD AUTO: 3.97 10*6/MM3 (ref 3.77–5.28)
SODIUM SERPL-SCNC: 135 MMOL/L (ref 136–145)
WBC NRBC COR # BLD: 15.34 10*3/MM3 (ref 3.4–10.8)

## 2023-03-16 PROCEDURE — G0378 HOSPITAL OBSERVATION PER HR: HCPCS

## 2023-03-16 PROCEDURE — 25010000002 MIDAZOLAM PER 1 MG: Performed by: ANESTHESIOLOGY

## 2023-03-16 PROCEDURE — 96366 THER/PROPH/DIAG IV INF ADDON: CPT

## 2023-03-16 PROCEDURE — 25010000002 HYDROMORPHONE 1 MG/ML SOLUTION: Performed by: NURSE ANESTHETIST, CERTIFIED REGISTERED

## 2023-03-16 PROCEDURE — 44970 LAPAROSCOPY APPENDECTOMY: CPT | Performed by: SURGERY

## 2023-03-16 PROCEDURE — 25010000002 FENTANYL CITRATE (PF) 50 MCG/ML SOLUTION: Performed by: NURSE ANESTHETIST, CERTIFIED REGISTERED

## 2023-03-16 PROCEDURE — 96375 TX/PRO/DX INJ NEW DRUG ADDON: CPT

## 2023-03-16 PROCEDURE — 25010000002 DEXAMETHASONE SODIUM PHOSPHATE 20 MG/5ML SOLUTION: Performed by: NURSE ANESTHETIST, CERTIFIED REGISTERED

## 2023-03-16 PROCEDURE — 96376 TX/PRO/DX INJ SAME DRUG ADON: CPT

## 2023-03-16 PROCEDURE — 25010000002 PROPOFOL 10 MG/ML EMULSION: Performed by: NURSE ANESTHETIST, CERTIFIED REGISTERED

## 2023-03-16 PROCEDURE — 88304 TISSUE EXAM BY PATHOLOGIST: CPT | Performed by: SURGERY

## 2023-03-16 PROCEDURE — 25010000002 PIPERACILLIN SOD-TAZOBACTAM PER 1 G: Performed by: SURGERY

## 2023-03-16 PROCEDURE — 80048 BASIC METABOLIC PNL TOTAL CA: CPT | Performed by: SURGERY

## 2023-03-16 PROCEDURE — 25010000002 HYDROMORPHONE PER 4 MG: Performed by: SURGERY

## 2023-03-16 PROCEDURE — 99222 1ST HOSP IP/OBS MODERATE 55: CPT | Performed by: SURGERY

## 2023-03-16 PROCEDURE — 25010000002 KETOROLAC TROMETHAMINE PER 15 MG: Performed by: SURGERY

## 2023-03-16 PROCEDURE — 96361 HYDRATE IV INFUSION ADD-ON: CPT

## 2023-03-16 PROCEDURE — 25010000002 ONDANSETRON PER 1 MG: Performed by: NURSE ANESTHETIST, CERTIFIED REGISTERED

## 2023-03-16 PROCEDURE — 85025 COMPLETE CBC W/AUTO DIFF WBC: CPT | Performed by: SURGERY

## 2023-03-16 DEVICE — THE ECHELON, ECHELON ENDOPATH™ AND ECHELON FLEX™ FAMILIES OF ENDOSCOPIC LINEAR CUTTERS AND RELOADS ARE STERILE, SINGLE PATIENT USE INSTRUMENTS THAT SIMULTANEOUSLY CUT AND STAPLE TISSUE. THERE ARE SIX STAGGERED ROWS OF STAPLES, THREE ON EITHER SIDE OF THE CUT LINE. THE 45 MM INSTRUMENTS HAVE A STAPLE LINE THATIS APPROXIMATELY 45 MM LONG AND A CUT LINE THAT IS APPROXIMATELY 42 MM LONG. THE SHAFT CAN ROTATE FREELY IN BOTH DIRECTIONS AND AN ARTICULATION MECHANISM ON ARTICULATING INSTRUMENTS ENABLES BENDING THE DISTAL PORTIONOF THE SHAFT TO FACILITATE LATERAL ACCESS OF THE OPERATIVE SITE.THE INSTRUMENTS ARE SHIPPED WITHOUT A RELOAD AND MUST BE LOADED PRIOR TO USE. A STAPLE RETAINING CAP ON THE RELOAD PROTECTS THE STAPLE LEG POINTS DURING SHIPPING AND TRANSPORTATION. THE INSTRUMENTS’ LOCK-OUT FEATURE IS DESIGNED TO PREVENT A USED RELOAD FROM BEING REFIRED.
Type: IMPLANTABLE DEVICE | Site: ABDOMEN | Status: FUNCTIONAL
Brand: ECHELON ENDOPATH

## 2023-03-16 DEVICE — ARISTA AH ABSORBABLE HEMOSTATIC PARTICLES
Type: IMPLANTABLE DEVICE | Site: ABDOMEN | Status: FUNCTIONAL
Brand: ARISTA™ AH

## 2023-03-16 DEVICE — HORIZON TI ML 6 CLIPS/CART
Type: IMPLANTABLE DEVICE | Site: ABDOMEN | Status: FUNCTIONAL
Brand: WECK

## 2023-03-16 RX ORDER — PROPOFOL 10 MG/ML
VIAL (ML) INTRAVENOUS AS NEEDED
Status: DISCONTINUED | OUTPATIENT
Start: 2023-03-16 | End: 2023-03-16 | Stop reason: SURG

## 2023-03-16 RX ORDER — ONDANSETRON 2 MG/ML
4 INJECTION INTRAMUSCULAR; INTRAVENOUS EVERY 6 HOURS PRN
Status: DISCONTINUED | OUTPATIENT
Start: 2023-03-16 | End: 2023-03-18 | Stop reason: HOSPADM

## 2023-03-16 RX ORDER — FENTANYL CITRATE 50 UG/ML
50 INJECTION, SOLUTION INTRAMUSCULAR; INTRAVENOUS
Status: DISCONTINUED | OUTPATIENT
Start: 2023-03-16 | End: 2023-03-16 | Stop reason: HOSPADM

## 2023-03-16 RX ORDER — HYDROMORPHONE HYDROCHLORIDE 1 MG/ML
0.5 INJECTION, SOLUTION INTRAMUSCULAR; INTRAVENOUS; SUBCUTANEOUS
Status: DISCONTINUED | OUTPATIENT
Start: 2023-03-16 | End: 2023-03-16

## 2023-03-16 RX ORDER — KETOROLAC TROMETHAMINE 15 MG/ML
15 INJECTION, SOLUTION INTRAMUSCULAR; INTRAVENOUS EVERY 6 HOURS PRN
Status: DISCONTINUED | OUTPATIENT
Start: 2023-03-16 | End: 2023-03-18 | Stop reason: HOSPADM

## 2023-03-16 RX ORDER — SODIUM CHLORIDE 0.9 % (FLUSH) 0.9 %
3-10 SYRINGE (ML) INJECTION AS NEEDED
Status: DISCONTINUED | OUTPATIENT
Start: 2023-03-16 | End: 2023-03-18 | Stop reason: HOSPADM

## 2023-03-16 RX ORDER — SODIUM CHLORIDE 0.9 % (FLUSH) 0.9 %
3 SYRINGE (ML) INJECTION EVERY 12 HOURS SCHEDULED
Status: DISCONTINUED | OUTPATIENT
Start: 2023-03-16 | End: 2023-03-18 | Stop reason: HOSPADM

## 2023-03-16 RX ORDER — LIDOCAINE HYDROCHLORIDE 20 MG/ML
INJECTION, SOLUTION INFILTRATION; PERINEURAL AS NEEDED
Status: DISCONTINUED | OUTPATIENT
Start: 2023-03-16 | End: 2023-03-16 | Stop reason: SURG

## 2023-03-16 RX ORDER — HYDROCHLOROTHIAZIDE 50 MG/1
50 TABLET ORAL DAILY
Status: DISCONTINUED | OUTPATIENT
Start: 2023-03-16 | End: 2023-03-18 | Stop reason: HOSPADM

## 2023-03-16 RX ORDER — DROPERIDOL 2.5 MG/ML
0.62 INJECTION, SOLUTION INTRAMUSCULAR; INTRAVENOUS
Status: DISCONTINUED | OUTPATIENT
Start: 2023-03-16 | End: 2023-03-16 | Stop reason: HOSPADM

## 2023-03-16 RX ORDER — PROMETHAZINE HYDROCHLORIDE 25 MG/1
25 SUPPOSITORY RECTAL ONCE AS NEEDED
Status: DISCONTINUED | OUTPATIENT
Start: 2023-03-16 | End: 2023-03-16 | Stop reason: HOSPADM

## 2023-03-16 RX ORDER — BUPIVACAINE HYDROCHLORIDE AND EPINEPHRINE 5; 5 MG/ML; UG/ML
INJECTION, SOLUTION EPIDURAL; INTRACAUDAL; PERINEURAL AS NEEDED
Status: DISCONTINUED | OUTPATIENT
Start: 2023-03-16 | End: 2023-03-16 | Stop reason: HOSPADM

## 2023-03-16 RX ORDER — ONDANSETRON 2 MG/ML
4 INJECTION INTRAMUSCULAR; INTRAVENOUS ONCE AS NEEDED
Status: DISCONTINUED | OUTPATIENT
Start: 2023-03-16 | End: 2023-03-16 | Stop reason: HOSPADM

## 2023-03-16 RX ORDER — ESTRADIOL 0.5 MG/1
0.5 TABLET ORAL DAILY
Status: DISCONTINUED | OUTPATIENT
Start: 2023-03-16 | End: 2023-03-18 | Stop reason: HOSPADM

## 2023-03-16 RX ORDER — MAGNESIUM HYDROXIDE 1200 MG/15ML
LIQUID ORAL AS NEEDED
Status: DISCONTINUED | OUTPATIENT
Start: 2023-03-16 | End: 2023-03-16 | Stop reason: HOSPADM

## 2023-03-16 RX ORDER — CITALOPRAM 20 MG/1
20 TABLET ORAL DAILY
Status: DISCONTINUED | OUTPATIENT
Start: 2023-03-16 | End: 2023-03-18 | Stop reason: HOSPADM

## 2023-03-16 RX ORDER — ROCURONIUM BROMIDE 10 MG/ML
INJECTION, SOLUTION INTRAVENOUS AS NEEDED
Status: DISCONTINUED | OUTPATIENT
Start: 2023-03-16 | End: 2023-03-16 | Stop reason: SURG

## 2023-03-16 RX ORDER — PANTOPRAZOLE SODIUM 40 MG/1
40 TABLET, DELAYED RELEASE ORAL
Status: DISCONTINUED | OUTPATIENT
Start: 2023-03-16 | End: 2023-03-18 | Stop reason: HOSPADM

## 2023-03-16 RX ORDER — ONDANSETRON 4 MG/1
4 TABLET, FILM COATED ORAL EVERY 6 HOURS PRN
Status: DISCONTINUED | OUTPATIENT
Start: 2023-03-16 | End: 2023-03-18 | Stop reason: HOSPADM

## 2023-03-16 RX ORDER — FLUMAZENIL 0.1 MG/ML
0.2 INJECTION INTRAVENOUS AS NEEDED
Status: DISCONTINUED | OUTPATIENT
Start: 2023-03-16 | End: 2023-03-16 | Stop reason: HOSPADM

## 2023-03-16 RX ORDER — DEXAMETHASONE SODIUM PHOSPHATE 4 MG/ML
INJECTION, SOLUTION INTRA-ARTICULAR; INTRALESIONAL; INTRAMUSCULAR; INTRAVENOUS; SOFT TISSUE AS NEEDED
Status: DISCONTINUED | OUTPATIENT
Start: 2023-03-16 | End: 2023-03-16 | Stop reason: SURG

## 2023-03-16 RX ORDER — MIDAZOLAM HYDROCHLORIDE 1 MG/ML
1 INJECTION INTRAMUSCULAR; INTRAVENOUS
Status: DISCONTINUED | OUTPATIENT
Start: 2023-03-16 | End: 2023-03-16 | Stop reason: HOSPADM

## 2023-03-16 RX ORDER — HYDRALAZINE HYDROCHLORIDE 20 MG/ML
5 INJECTION INTRAMUSCULAR; INTRAVENOUS
Status: DISCONTINUED | OUTPATIENT
Start: 2023-03-16 | End: 2023-03-16 | Stop reason: HOSPADM

## 2023-03-16 RX ORDER — SODIUM CHLORIDE 9 MG/ML
40 INJECTION, SOLUTION INTRAVENOUS AS NEEDED
Status: DISCONTINUED | OUTPATIENT
Start: 2023-03-16 | End: 2023-03-18 | Stop reason: HOSPADM

## 2023-03-16 RX ORDER — ACETAMINOPHEN 650 MG/1
650 SUPPOSITORY RECTAL ONCE AS NEEDED
Status: DISCONTINUED | OUTPATIENT
Start: 2023-03-16 | End: 2023-03-16 | Stop reason: HOSPADM

## 2023-03-16 RX ORDER — LABETALOL HYDROCHLORIDE 5 MG/ML
5 INJECTION, SOLUTION INTRAVENOUS
Status: DISCONTINUED | OUTPATIENT
Start: 2023-03-16 | End: 2023-03-16 | Stop reason: HOSPADM

## 2023-03-16 RX ORDER — SUCCINYLCHOLINE/SOD CL,ISO/PF 200MG/10ML
SYRINGE (ML) INTRAVENOUS AS NEEDED
Status: DISCONTINUED | OUTPATIENT
Start: 2023-03-16 | End: 2023-03-16 | Stop reason: SURG

## 2023-03-16 RX ORDER — SODIUM CHLORIDE, SODIUM LACTATE, POTASSIUM CHLORIDE, CALCIUM CHLORIDE 600; 310; 30; 20 MG/100ML; MG/100ML; MG/100ML; MG/100ML
100 INJECTION, SOLUTION INTRAVENOUS CONTINUOUS
Status: DISCONTINUED | OUTPATIENT
Start: 2023-03-16 | End: 2023-03-17

## 2023-03-16 RX ORDER — NALOXONE HCL 0.4 MG/ML
0.2 VIAL (ML) INJECTION AS NEEDED
Status: DISCONTINUED | OUTPATIENT
Start: 2023-03-16 | End: 2023-03-16 | Stop reason: HOSPADM

## 2023-03-16 RX ORDER — FAMOTIDINE 10 MG/ML
20 INJECTION, SOLUTION INTRAVENOUS ONCE
Status: COMPLETED | OUTPATIENT
Start: 2023-03-16 | End: 2023-03-16

## 2023-03-16 RX ORDER — PROMETHAZINE HYDROCHLORIDE 25 MG/1
25 TABLET ORAL ONCE AS NEEDED
Status: DISCONTINUED | OUTPATIENT
Start: 2023-03-16 | End: 2023-03-16 | Stop reason: HOSPADM

## 2023-03-16 RX ORDER — ACETAMINOPHEN 325 MG/1
650 TABLET ORAL ONCE AS NEEDED
Status: DISCONTINUED | OUTPATIENT
Start: 2023-03-16 | End: 2023-03-16 | Stop reason: HOSPADM

## 2023-03-16 RX ORDER — ACETAMINOPHEN 325 MG/1
650 TABLET ORAL EVERY 4 HOURS PRN
Status: DISCONTINUED | OUTPATIENT
Start: 2023-03-16 | End: 2023-03-18 | Stop reason: HOSPADM

## 2023-03-16 RX ORDER — LIDOCAINE HYDROCHLORIDE 10 MG/ML
0.5 INJECTION, SOLUTION EPIDURAL; INFILTRATION; INTRACAUDAL; PERINEURAL ONCE AS NEEDED
Status: DISCONTINUED | OUTPATIENT
Start: 2023-03-16 | End: 2023-03-16 | Stop reason: HOSPADM

## 2023-03-16 RX ORDER — NALOXONE HCL 0.4 MG/ML
0.4 VIAL (ML) INJECTION
Status: DISCONTINUED | OUTPATIENT
Start: 2023-03-16 | End: 2023-03-18 | Stop reason: HOSPADM

## 2023-03-16 RX ORDER — SODIUM CHLORIDE 0.9 % (FLUSH) 0.9 %
3 SYRINGE (ML) INJECTION EVERY 12 HOURS SCHEDULED
Status: DISCONTINUED | OUTPATIENT
Start: 2023-03-16 | End: 2023-03-16 | Stop reason: HOSPADM

## 2023-03-16 RX ORDER — HYDROMORPHONE HYDROCHLORIDE 1 MG/ML
0.5 INJECTION, SOLUTION INTRAMUSCULAR; INTRAVENOUS; SUBCUTANEOUS
Status: DISCONTINUED | OUTPATIENT
Start: 2023-03-16 | End: 2023-03-16 | Stop reason: HOSPADM

## 2023-03-16 RX ORDER — EPHEDRINE SULFATE 50 MG/ML
5 INJECTION, SOLUTION INTRAVENOUS ONCE AS NEEDED
Status: DISCONTINUED | OUTPATIENT
Start: 2023-03-16 | End: 2023-03-16 | Stop reason: HOSPADM

## 2023-03-16 RX ORDER — HYDROCODONE BITARTRATE AND ACETAMINOPHEN 5; 325 MG/1; MG/1
1 TABLET ORAL ONCE AS NEEDED
Status: DISCONTINUED | OUTPATIENT
Start: 2023-03-16 | End: 2023-03-16 | Stop reason: HOSPADM

## 2023-03-16 RX ORDER — OXYCODONE HYDROCHLORIDE AND ACETAMINOPHEN 5; 325 MG/1; MG/1
1 TABLET ORAL EVERY 4 HOURS PRN
Status: DISCONTINUED | OUTPATIENT
Start: 2023-03-16 | End: 2023-03-18 | Stop reason: HOSPADM

## 2023-03-16 RX ORDER — ONDANSETRON 2 MG/ML
INJECTION INTRAMUSCULAR; INTRAVENOUS AS NEEDED
Status: DISCONTINUED | OUTPATIENT
Start: 2023-03-16 | End: 2023-03-16 | Stop reason: SURG

## 2023-03-16 RX ORDER — FENTANYL CITRATE 50 UG/ML
INJECTION, SOLUTION INTRAMUSCULAR; INTRAVENOUS AS NEEDED
Status: DISCONTINUED | OUTPATIENT
Start: 2023-03-16 | End: 2023-03-16 | Stop reason: SURG

## 2023-03-16 RX ORDER — SODIUM CHLORIDE 0.9 % (FLUSH) 0.9 %
3-10 SYRINGE (ML) INJECTION AS NEEDED
Status: DISCONTINUED | OUTPATIENT
Start: 2023-03-16 | End: 2023-03-16 | Stop reason: HOSPADM

## 2023-03-16 RX ORDER — SODIUM CHLORIDE, SODIUM LACTATE, POTASSIUM CHLORIDE, CALCIUM CHLORIDE 600; 310; 30; 20 MG/100ML; MG/100ML; MG/100ML; MG/100ML
9 INJECTION, SOLUTION INTRAVENOUS CONTINUOUS
Status: DISCONTINUED | OUTPATIENT
Start: 2023-03-16 | End: 2023-03-17

## 2023-03-16 RX ADMIN — TAZOBACTAM SODIUM AND PIPERACILLIN SODIUM 3.38 G: 375; 3 INJECTION, SOLUTION INTRAVENOUS at 23:28

## 2023-03-16 RX ADMIN — ACETAMINOPHEN 650 MG: 325 TABLET, FILM COATED ORAL at 23:24

## 2023-03-16 RX ADMIN — Medication 3 ML: at 08:32

## 2023-03-16 RX ADMIN — TAZOBACTAM SODIUM AND PIPERACILLIN SODIUM 3.38 G: 375; 3 INJECTION, SOLUTION INTRAVENOUS at 05:45

## 2023-03-16 RX ADMIN — KETOROLAC TROMETHAMINE 15 MG: 15 INJECTION, SOLUTION INTRAMUSCULAR; INTRAVENOUS at 09:39

## 2023-03-16 RX ADMIN — FENTANYL CITRATE 25 MCG: 50 INJECTION, SOLUTION INTRAMUSCULAR; INTRAVENOUS at 15:15

## 2023-03-16 RX ADMIN — KETOROLAC TROMETHAMINE 15 MG: 15 INJECTION, SOLUTION INTRAMUSCULAR; INTRAVENOUS at 16:45

## 2023-03-16 RX ADMIN — DEXAMETHASONE SODIUM PHOSPHATE 8 MG: 4 INJECTION, SOLUTION INTRAMUSCULAR; INTRAVENOUS at 15:15

## 2023-03-16 RX ADMIN — SODIUM CHLORIDE, POTASSIUM CHLORIDE, SODIUM LACTATE AND CALCIUM CHLORIDE 9 ML/HR: 600; 310; 30; 20 INJECTION, SOLUTION INTRAVENOUS at 13:30

## 2023-03-16 RX ADMIN — KETOROLAC TROMETHAMINE 15 MG: 15 INJECTION, SOLUTION INTRAMUSCULAR; INTRAVENOUS at 03:38

## 2023-03-16 RX ADMIN — FENTANYL CITRATE 25 MCG: 50 INJECTION, SOLUTION INTRAMUSCULAR; INTRAVENOUS at 15:20

## 2023-03-16 RX ADMIN — ROCURONIUM BROMIDE 10 MG: 10 INJECTION INTRAVENOUS at 15:07

## 2023-03-16 RX ADMIN — MIDAZOLAM 1 MG: 1 INJECTION INTRAMUSCULAR; INTRAVENOUS at 14:48

## 2023-03-16 RX ADMIN — SODIUM CHLORIDE, POTASSIUM CHLORIDE, SODIUM LACTATE AND CALCIUM CHLORIDE: 600; 310; 30; 20 INJECTION, SOLUTION INTRAVENOUS at 15:58

## 2023-03-16 RX ADMIN — FAMOTIDINE 20 MG: 10 INJECTION INTRAVENOUS at 13:43

## 2023-03-16 RX ADMIN — ONDANSETRON 4 MG: 2 INJECTION INTRAMUSCULAR; INTRAVENOUS at 15:15

## 2023-03-16 RX ADMIN — Medication 3 ML: at 01:03

## 2023-03-16 RX ADMIN — TAZOBACTAM SODIUM AND PIPERACILLIN SODIUM 3.38 G: 375; 3 INJECTION, SOLUTION INTRAVENOUS at 13:30

## 2023-03-16 RX ADMIN — SUGAMMADEX 200 MG: 100 INJECTION, SOLUTION INTRAVENOUS at 16:00

## 2023-03-16 RX ADMIN — SODIUM CHLORIDE, POTASSIUM CHLORIDE, SODIUM LACTATE AND CALCIUM CHLORIDE 100 ML/HR: 600; 310; 30; 20 INJECTION, SOLUTION INTRAVENOUS at 01:02

## 2023-03-16 RX ADMIN — MESALAMINE 250 MG: 250 CAPSULE ORAL at 17:32

## 2023-03-16 RX ADMIN — HYDROMORPHONE HYDROCHLORIDE 0.5 MG: 1 INJECTION, SOLUTION INTRAMUSCULAR; INTRAVENOUS; SUBCUTANEOUS at 16:11

## 2023-03-16 RX ADMIN — Medication 140 MG: at 15:07

## 2023-03-16 RX ADMIN — ROCURONIUM BROMIDE 25 MG: 10 INJECTION INTRAVENOUS at 15:18

## 2023-03-16 RX ADMIN — HYDROMORPHONE HYDROCHLORIDE 0.5 MG: 1 INJECTION, SOLUTION INTRAMUSCULAR; INTRAVENOUS; SUBCUTANEOUS at 01:02

## 2023-03-16 RX ADMIN — LIDOCAINE HYDROCHLORIDE 100 MG: 20 INJECTION, SOLUTION INFILTRATION; PERINEURAL at 15:07

## 2023-03-16 RX ADMIN — FENTANYL CITRATE 50 MCG: 50 INJECTION, SOLUTION INTRAMUSCULAR; INTRAVENOUS at 15:34

## 2023-03-16 RX ADMIN — SODIUM CHLORIDE, POTASSIUM CHLORIDE, SODIUM LACTATE AND CALCIUM CHLORIDE 100 ML/HR: 600; 310; 30; 20 INJECTION, SOLUTION INTRAVENOUS at 09:45

## 2023-03-16 RX ADMIN — PROPOFOL 150 MG: 10 INJECTION, EMULSION INTRAVENOUS at 15:07

## 2023-03-16 NOTE — ANESTHESIA PROCEDURE NOTES
Airway  Urgency: elective    Date/Time: 3/16/2023 3:11 PM  Airway not difficult    General Information and Staff    Patient location during procedure: OR  Anesthesiologist: Juliet Mina MD  CRNA/CAA: Chelsi Rios CRNA    Indications and Patient Condition  Indications for airway management: airway protection    Preoxygenated: yes (pt pre-O2 with 100% O2)  Mask difficulty assessment: 0 - not attempted    Final Airway Details  Final airway type: endotracheal airway      Successful airway: ETT  Cuffed: yes   Successful intubation technique: direct laryngoscopy and RSI  Facilitating devices/methods: anterior pressure/BURP  Endotracheal tube insertion site: oral  Blade: Cee  Blade size: 4  ETT size (mm): 7.0  Cormack-Lehane Classification: grade I - full view of glottis  Placement verified by: chest auscultation and capnometry   Cuff volume (mL): 7  Measured from: lips  Number of attempts at approach: 1  Assessment: lips, teeth, and gum same as pre-op and atraumatic intubation    Additional Comments  ATOETx1. No change in dentition.

## 2023-03-16 NOTE — ED PROVIDER NOTES
EMERGENCY DEPARTMENT ENCOUNTER    Room Number:  16/16  Date seen:  3/15/2023  PCP: Alda Concepcion, GRANT  Historian: Patient      HPI:  Chief Complaint: Abdominal pain  A complete HPI/ROS/PMH/PSH/SH/FH are unobtainable due to:   Context: Rakel Velarde is a 62 y.o. female who presents to the ED c/o abdominal pain.  Pain began last night and is mostly in the upper abdomen.  It is sharp and cramping.  Nothing really makes it much worse and nothing makes it much better.  She has not had fever.  She had some nausea but no vomiting or diarrhea.  Patient states pain feels similar to prior exacerbation of ulcerative colitis but she has not had this in many years and is currently taking Pentasa 4 pills in the morning and 4 pills in the evening.  Her GI doctor is Dr. Melanie Carballo      MEDICAL RECORD REVIEW (non ED)  I reviewed prior medical records including most recent office visit from December of last year.  Patient has history of hypertension, hyperlipidemia and ulcerative colitis.    PAST MEDICAL HISTORY  Active Ambulatory Problems     Diagnosis Date Noted   • Anxiety and depression 01/17/2016   • Gastro-esophageal reflux disease without esophagitis 01/17/2016   • Osteopenia 01/17/2016   • Obesity (BMI 30-39.9) 01/17/2016   • Ulcerative colitis (HCC)    • Vitamin D deficiency    • Hyperlipidemia    • Anxiety    • Hypertension    • HSV infection    • Hallux valgus    • Ulcerative rectosigmoiditis with rectal bleeding (HCC) 04/02/2019   • Diarrhea due to malabsorption 04/02/2019   • Intrinsic eczema 06/17/2019   • Perioral dermatitis 06/17/2019   • Lichen planus 12/16/2019   • Family history of esophageal cancer 03/31/2022     Resolved Ambulatory Problems     Diagnosis Date Noted   • Angular cheilitis 01/17/2016   • Anxiety 01/17/2016   • Blood pressure elevated without history of HTN 01/17/2016   • GERD (gastroesophageal reflux disease)    • Ulcerative colitis (HCC)    • Dyslipidemia    • Osteopenia      Past Medical  History:   Diagnosis Date   • Coronary artery disease    • Obesity          PAST SURGICAL HISTORY  Past Surgical History:   Procedure Laterality Date   • BREAST BIOPSY Left     x 2 years ago benign   •  SECTION     • COLONOSCOPY  2011    Dr Dennison in 3-5 years   • COLONOSCOPY N/A 2019    Procedure: COLONOSCOPY to cecum and t.i. with bx and polypectomy;  Surgeon: Melanie Carballo MD;  Location:  [facility];  Service: Gastroenterology   • COLONOSCOPY N/A 2022    Procedure: COLONOSCOPY to cecum and TI with biopsies;  Surgeon: Melanie Carballo MD;  Location: [facility];  Service: Gastroenterology;  Laterality: N/A;  pre- ulcerative colitis  post- diverticulosis, mild rectal inflammation, hemorrhoids   • ENDOSCOPY N/A 2022    Procedure: ESOPHAGOGASTRODUODENOSCOPY with biopsies;  Surgeon: Melanie Carballo MD;  Location: [facility];  Service: Gastroenterology;  Laterality: N/A;  pre- GERD, family hx of esophageal cancer  post- irregular z-line @ 37cm, hiatal hernia @ 40cm, gastritis   • HYSTEROSCOPY W/ ENDOMETRIAL ABLATION     • MANDIBLE FRACTURE SURGERY     • TONSILLECTOMY     • TUBAL ABDOMINAL LIGATION     • UPPER GASTROINTESTINAL ENDOSCOPY     • WISDOM TOOTH EXTRACTION           FAMILY HISTORY  Family History   Problem Relation Age of Onset   • Breast cancer Mother 50   • Emphysema Mother    • Macular degeneration Mother    • Hypertension Mother    • Pancreatitis Mother    • Hypertension Father    • Dementia Father    • Parkinsonism Father    • Depression Brother    • Esophageal cancer Brother    • Hypertension Brother    • Melanoma Brother    • Lymphoma Maternal Grandmother    • Macular degeneration Maternal Grandmother    • Breast cancer Maternal Aunt    • Macular degeneration Maternal Aunt    • Breast cancer Maternal Aunt    • Macular degeneration Maternal Uncle    • Breast cancer Cousin    • Breast cancer Cousin    • Breast cancer Cousin    • Malig Hyperthermia Neg Hx     • Ovarian cancer Neg Hx          SOCIAL HISTORY  Social History     Socioeconomic History   • Marital status:    Tobacco Use   • Smoking status: Never   • Smokeless tobacco: Never   Vaping Use   • Vaping Use: Never used   Substance and Sexual Activity   • Alcohol use: Yes     Alcohol/week: 4.0 standard drinks     Types: 4 Glasses of wine per week     Comment: socially   • Drug use: No   • Sexual activity: Not Currently     Partners: Male     Birth control/protection: Post-menopausal         ALLERGIES  Lisinopril        REVIEW OF SYSTEMS  Review of Systems   Constitutional: Negative for fever.   Respiratory: Negative for shortness of breath.    Cardiovascular: Negative for chest pain.   Gastrointestinal: Positive for abdominal pain and nausea.   All other systems reviewed and are negative.           PHYSICAL EXAM  ED Triage Vitals   Temp Heart Rate Resp BP SpO2   03/15/23 1703 03/15/23 1703 03/15/23 1710 03/15/23 1710 03/15/23 1703   98.6 °F (37 °C) 76 16 160/98 97 %      Temp src Heart Rate Source Patient Position BP Location FiO2 (%)   -- -- -- -- --              Physical Exam    GENERAL: Alert and pleasant female no obvious distress.  Triage vitals reviewed notable for elevated blood pressure 160/98.  Temperature heart rate and O2 sats are benign  HENT: nares patent  EYES: no scleral icterus  CV: regular rhythm, regular rate  RESPIRATORY: normal effort  ABDOMEN: soft, mild upper abdominal tenderness without rebound or guard  MUSCULOSKELETAL: no deformity  NEURO: Strength sensation and coordination are grossly intact.  Speech and mentation are unremarkable  SKIN: warm, dry      Vital signs and nursing notes reviewed.          LAB RESULTS  Recent Results (from the past 24 hour(s))   ECG 12 Lead ED Triage Standing Order; Abdominal Pain (Upper)    Collection Time: 03/15/23  5:14 PM   Result Value Ref Range    QT Interval 435 ms   Comprehensive Metabolic Panel    Collection Time: 03/15/23  5:21 PM     Specimen: Blood   Result Value Ref Range    Glucose 101 (H) 65 - 99 mg/dL    BUN 11 8 - 23 mg/dL    Creatinine 0.71 0.57 - 1.00 mg/dL    Sodium 138 136 - 145 mmol/L    Potassium 3.7 3.5 - 5.2 mmol/L    Chloride 97 (L) 98 - 107 mmol/L    CO2 28.0 22.0 - 29.0 mmol/L    Calcium 9.7 8.6 - 10.5 mg/dL    Total Protein 8.0 6.0 - 8.5 g/dL    Albumin 4.3 3.5 - 5.2 g/dL    ALT (SGPT) 19 1 - 33 U/L    AST (SGOT) 16 1 - 32 U/L    Alkaline Phosphatase 76 39 - 117 U/L    Total Bilirubin 0.4 0.0 - 1.2 mg/dL    Globulin 3.7 gm/dL    A/G Ratio 1.2 g/dL    BUN/Creatinine Ratio 15.5 7.0 - 25.0    Anion Gap 13.0 5.0 - 15.0 mmol/L    eGFR 95.7 >60.0 mL/min/1.73   Lipase    Collection Time: 03/15/23  5:21 PM    Specimen: Blood   Result Value Ref Range    Lipase 17 13 - 60 U/L   Single High Sensitivity Troponin T    Collection Time: 03/15/23  5:21 PM    Specimen: Blood   Result Value Ref Range    HS Troponin T 8 <10 ng/L   Green Top (Gel)    Collection Time: 03/15/23  5:21 PM   Result Value Ref Range    Extra Tube Hold for add-ons.    Lavender Top    Collection Time: 03/15/23  5:21 PM   Result Value Ref Range    Extra Tube hold for add-on    Gold Top - SST    Collection Time: 03/15/23  5:21 PM   Result Value Ref Range    Extra Tube Hold for add-ons.    Light Blue Top    Collection Time: 03/15/23  5:21 PM   Result Value Ref Range    Extra Tube Hold for add-ons.    CBC Auto Differential    Collection Time: 03/15/23  5:21 PM    Specimen: Blood   Result Value Ref Range    WBC 11.91 (H) 3.40 - 10.80 10*3/mm3    RBC 4.45 3.77 - 5.28 10*6/mm3    Hemoglobin 13.8 12.0 - 15.9 g/dL    Hematocrit 40.6 34.0 - 46.6 %    MCV 91.2 79.0 - 97.0 fL    MCH 31.0 26.6 - 33.0 pg    MCHC 34.0 31.5 - 35.7 g/dL    RDW 12.8 12.3 - 15.4 %    RDW-SD 41.9 37.0 - 54.0 fl    MPV 10.3 6.0 - 12.0 fL    Platelets 296 140 - 450 10*3/mm3    Neutrophil % 69.3 42.7 - 76.0 %    Lymphocyte % 24.1 19.6 - 45.3 %    Monocyte % 5.0 5.0 - 12.0 %    Eosinophil % 0.9 0.3 - 6.2 %     Basophil % 0.4 0.0 - 1.5 %    Immature Grans % 0.3 0.0 - 0.5 %    Neutrophils, Absolute 8.25 (H) 1.70 - 7.00 10*3/mm3    Lymphocytes, Absolute 2.87 0.70 - 3.10 10*3/mm3    Monocytes, Absolute 0.59 0.10 - 0.90 10*3/mm3    Eosinophils, Absolute 0.11 0.00 - 0.40 10*3/mm3    Basophils, Absolute 0.05 0.00 - 0.20 10*3/mm3    Immature Grans, Absolute 0.04 0.00 - 0.05 10*3/mm3    nRBC 0.0 0.0 - 0.2 /100 WBC       Ordered the above labs and independently interpreted results. My findings will be discussed in the medical decision making section below        RADIOLOGY  CT Abdomen Pelvis With Contrast    Result Date: 3/15/2023  CT ABDOMEN PELVIS W CONTRAST-  Radiation dose reduction techniques were utilized, including automated exposure control and exposure modulation based on body size.  Clinical: Abdominal pain with a history of ulcerative colitis  COMPARISON: None  FINDINGS: There are 2, 10 mm right renal cortical cysts, the right kidney is otherwise satisfactory in appearance. The liver, gallbladder, pancreas, spleen, adrenal glands and left kidney are normal. No obstructive uropathy, the ureters are normal in course and caliber to the urinary bladder which is satisfactory in appearance. No intraluminal filling defect.  2 cm fibroid arising from the posterior lower uterine segment. Both ovaries are symmetric and normal.  There is diverticulosis of the colon. No diverticulitis.  There are 3 appendicoliths demonstrated at the base of the appendix. The distal aspect of the appendix is mildly dilated up to 13 mm and fluid-filled and there is a trace amount of periappendiceal fat edema, suspect early appendicitis.  The small bowel is satisfactory in appearance. The stomach is collapsed, contour within normal limits no duodenal abnormality seen.  CONCLUSION: The findings are consistent with appendicitis, perhaps early appendicitis.   This report was finalized on 3/15/2023 9:52 PM by Dr. Enrique Roberts M.D.        I ordered and  independently reviewed the above noted radiographic studies.      I viewed images of CT scan which showed acute appendicitis per my independent interpretation.    See radiologist's dictation for official interpretation.             PROCEDURES  Procedures          MEDICATIONS GIVEN IN ER  Medications   sodium chloride 0.9 % flush 10 mL (has no administration in time range)   morphine injection 4 mg (has no administration in time range)   piperacillin-tazobactam (ZOSYN) 3.375 g in iso-osmotic dextrose 50 ml (premix) (has no administration in time range)   morphine injection 4 mg (4 mg Intravenous Given 3/15/23 2035)   ondansetron (ZOFRAN) injection 4 mg (4 mg Intravenous Given 3/15/23 2026)   iopamidol (ISOVUE-300) 61 % injection 100 mL (85 mL Intravenous Given 3/15/23 2117)               MEDICAL DECISION MAKING, PROGRESS, and CONSULTS    All labs have been independently reviewed by me.  All radiology studies have been reviewed by me and I have also reviewed the radiology report.   EKG's independently viewed and interpreted by me.  Discussion below represents my analysis of pertinent findings related to patient's condition, differential diagnosis, treatment plan and final disposition.      Additional sources:  - Discussed/ obtained information from independent historians: Daughter at bedside    - External (non-ED) record review: Please see documented above    - Chronic or social conditions impacting care: History of ulcerative colitis, hypertension    - Shared decision making: I discussed ED evaluation and treatment plan with patient who is in agreement.  I reviewed labs, gave IV pain medication.  I ordered CT scan and independently interpreted results.  Patient with apparent acute appendicitis.  I spoke with on-call surgeon, Dr. Duggan who will admit.      Orders placed during this visit:  Orders Placed This Encounter   Procedures   • CT Abdomen Pelvis With Contrast   • [place] Draw   • Comprehensive Metabolic Panel    • Lipase   • Single High Sensitivity Troponin T   • Urinalysis With Microscopic If Indicated (No Culture) - Urine, Clean Catch   • CBC Auto Differential   • Urinalysis, Microscopic Only - Urine, Clean Catch   • NPO Diet NPO Type: Strict NPO   • Undress & Gown   • Continuous Pulse Oximetry   • Vital Signs   • Discontinue Cardiac Monitoring   • Surgery (on-call MD unless specified)   • Oxygen Therapy- Nasal Cannula; 2 LPM; Titrate for SPO2: 92%, Greater Than or Equal To   • ECG 12 Lead ED Triage Standing Order; Abdominal Pain (Upper)   • Insert Peripheral IV   • Initiate Observation Status   • CBC & Differential   • Green Top (Gel)   • Lavender Top   • Gold Top - SST   • Light Blue Top           Differential diagnosis:    Please see as documented below in ED course      Independent interpretation of labs, radiology studies, and discussions with consultants:  ED Course as of 03/15/23 2217   Wed Mar 15, 2023   2009 EKG independently interpreted by me    Time 5:14 PM  Sinus 61  Normal P waves and IL intervals  QRS-normal axis, normal QRS  ST, T wave-unremarkable    Not significant change compared to 2013 [DB]   2016 OLZ-30-afic-old female with history of ulcerative colitis who is currently on Pentasa presents with worsening abdominal pain onset yesterday.  Pain is across the upper abdomen    On exam she has some mild diffuse tenderness palpation in the upper abdomen-vitals are unremarkable    Differential diagnosis-flare of ulcerative colitis seems the most likely cause but would also consider gastritis, gastroenteritis, pancreatitis or cholecystitis.    We will go ahead and give some morphine and Zofran to help with pain and nausea. [DB]   2016 Labs reviewed show an elevated white blood cell count of 11.91 which is worrisome for possible infection or flare of ulcerative colitis.    Chemistries are relatively benign without significant hepatic or renal failure.  Electrolytes are unremarkable.  High-sensitivity troponin  is normal and go against acute coronary syndrome.  Lipase is normal go against pancreatitis. [DB]   3855 I discussed treatment and evaluation this patient with Dr. Duggan from the surgical department who will admit for likely appendectomy tomorrow.  We will go ahead and give some IV Zosyn. [DB]      ED Course User Index  [DB] Chivo Fenton MD             I used full protective equipment while examining this patient.  This includes face mask, gloves and protective eyewear.  I washed my hands before entering the room and immediately upon leaving the room    DIAGNOSIS  Final diagnoses:   Acute appendicitis, unspecified acute appendicitis type   Obesity (BMI 30-39.9)   Primary hypertension         DISPOSITION  Admission        Latest Documented Vital Signs:  As of 22:17 EDT  BP- 153/77 HR- 64 Temp- 98.6 °F (37 °C) O2 sat- 96%              --    Please note that portions of this were completed with a voice recognition program.       Note Disclaimer: At [facility], we believe that sharing information builds trust and better relationships. You are receiving this note because you are receiving care at [facility] or recently visited. It is possible you will see health information before a provider has talked with you about it. This kind of information can be easy to misunderstand. To help you fully understand what it means for your health, we urge you to discuss this note with your provider.           Chivo Fenton MD  03/15/23 0884

## 2023-03-16 NOTE — H&P
Admission H&P    Admiting Physician: Mich Duggan MD    Reason for admission: Acute appendicitis.    Chief Complaint   Patient presents with   • Abdominal Pain       HPI:   The patient is a very pleasant 62 y.o. years old female that presented to the hospital with abdominal pain.  The pain is described as sharp, and is 8/10 in intensity. Pain started over the left side of the abdomen that radiated to the back.  The symptoms have radiate now to the epigastric area and right lower quadrant.  Onset was 24 hours ago.  Symptoms have been slightly worsening.  She reports nausea but no vomiting.  Denies any fevers or chills.  The patient has history of ulcerative colitis that has been inactive for the past 20 years.  He has been on Pentasa.  She reports regular bowel movements..   CT scan of the abdomen was performed that showed evidence of enlarged appendix with appendicolith and mild stranding consistent with appendicitis.  Last colonoscopy was performed in  and there was no evidence of colitis    Past Medical History:   Diagnosis Date   • Angular cheilitis 2016   • Anxiety    • Coronary artery disease    • GERD (gastroesophageal reflux disease)    • Hallux valgus     followed by Ortho, Dr. Humphreys   • HSV infection     followed by OB/GYN, Dr. Maria Antonia Gray   • Hyperlipidemia    • Hypertension    • Obesity    • Osteopenia    • Ulcerative colitis (HCC)     followed by Dr. Carballo, Q2 yr colonoscopies   • Vitamin D deficiency        Past Surgical History:   Procedure Laterality Date   • BREAST BIOPSY Left     x 2 years ago benign   •  SECTION     • COLONOSCOPY  2011    Dr Dennison in 3-5 years   • COLONOSCOPY N/A 2019    Procedure: COLONOSCOPY to cecum and t.i. with bx and polypectomy;  Surgeon: Melanie Carballo MD;  Location: [facility] ENDOSCOPY;  Service: Gastroenterology   • COLONOSCOPY N/A 2022    Procedure: COLONOSCOPY to cecum and TI with biopsies;  Surgeon: Melanie Carballo MD;   Location:  [facility] ENDOSCOPY;  Service: Gastroenterology;  Laterality: N/A;  pre- ulcerative colitis  post- diverticulosis, mild rectal inflammation, hemorrhoids   • ENDOSCOPY N/A 08/19/2022    Procedure: ESOPHAGOGASTRODUODENOSCOPY with biopsies;  Surgeon: Melanie Carballo MD;  Location: [facility];  Service: Gastroenterology;  Laterality: N/A;  pre- GERD, family hx of esophageal cancer  post- irregular z-line @ 37cm, hiatal hernia @ 40cm, gastritis   • HYSTEROSCOPY W/ ENDOMETRIAL ABLATION     • MANDIBLE FRACTURE SURGERY     • TONSILLECTOMY     • TUBAL ABDOMINAL LIGATION     • UPPER GASTROINTESTINAL ENDOSCOPY     • WISDOM TOOTH EXTRACTION           Current Facility-Administered Medications:   •  acetaminophen (TYLENOL) tablet 650 mg, 650 mg, Oral, Q4H PRN, Mich Duggan MD  •  citalopram (CeleXA) tablet 20 mg, 20 mg, Oral, Daily, Mich Duggan MD  •  estradiol (ESTRACE) tablet 0.5 mg, 0.5 mg, Oral, Daily, Mich Duggan MD  •  fentaNYL citrate (PF) (SUBLIMAZE) injection 50 mcg, 50 mcg, Intravenous, Q10 Min PRN, Arben Jackson MD  •  hydroCHLOROthiazide (HYDRODIURIL) tablet 50 mg, 50 mg, Oral, Daily, Mich Duggan MD  •  HYDROmorphone (DILAUDID) injection 0.5 mg, 0.5 mg, Intravenous, Q2H PRN, 0.5 mg at 03/16/23 0102 **AND** naloxone (NARCAN) injection 0.4 mg, 0.4 mg, Intravenous, Q5 Min PRN, Mich Duggan MD  •  hyoscyamine (LEVSIN) SL tablet 125 mcg, 125 mcg, Sublingual, Q6H PRN, Mich Duggan MD  •  ketorolac (TORADOL) injection 15 mg, 15 mg, Intravenous, Q6H PRN, Mich Duggan MD, 15 mg at 03/16/23 0939  •  lactated ringers infusion, 100 mL/hr, Intravenous, Continuous, Mich Duggan MD, Stopped at 03/16/23 1251  •  lactated ringers infusion, 9 mL/hr, Intravenous, Continuous, Arben Jackson MD, Last Rate: 9 mL/hr at 03/16/23 1330, 9 mL/hr at 03/16/23 1330  •  lidocaine PF 1% (XYLOCAINE) injection 0.5  mL, 0.5 mL, Injection, Once PRN, Arben Jackson MD  •  mesalamine (PENTASA) CR capsule 250 mg, 250 mg, Oral, BID AC, Mich Duggan MD  •  midazolam (VERSED) injection 1 mg, 1 mg, Intravenous, Q10 Min PRN, Arben Jackson MD  •  ondansetron (ZOFRAN) tablet 4 mg, 4 mg, Oral, Q6H PRN **OR** ondansetron (ZOFRAN) injection 4 mg, 4 mg, Intravenous, Q6H PRN, Mich Duggan MD  •  oxyCODONE-acetaminophen (PERCOCET) 5-325 MG per tablet 1 tablet, 1 tablet, Oral, Q4H PRN, Mich Duggan MD  •  pantoprazole (PROTONIX) EC tablet 40 mg, 40 mg, Oral, Q AM, Mich Duggan MD  •  piperacillin-tazobactam (ZOSYN) 3.375 g in iso-osmotic dextrose 50 ml (premix), 3.375 g, Intravenous, Q8H, Mich Duggan MD, Last Rate: 0 mL/hr at 03/16/23 0945, 3.375 g at 03/16/23 1330  •  sodium chloride 0.9 % flush 3 mL, 3 mL, Intravenous, Q12H, Mich Duggan MD, 3 mL at 03/16/23 0832  •  sodium chloride 0.9 % flush 3 mL, 3 mL, Intravenous, Q12H, Arben Jackson MD  •  sodium chloride 0.9 % flush 3-10 mL, 3-10 mL, Intravenous, PRN, Mich Duggan MD  •  sodium chloride 0.9 % flush 3-10 mL, 3-10 mL, Intravenous, PRN, Arben Jackson MD  •  sodium chloride 0.9 % infusion 40 mL, 40 mL, Intravenous, PRN, Mich Duggan MD    Allergies   Allergen Reactions   • Lisinopril Cough       Social History     Socioeconomic History   • Marital status:    Tobacco Use   • Smoking status: Never   • Smokeless tobacco: Never   Vaping Use   • Vaping Use: Never used   Substance and Sexual Activity   • Alcohol use: Yes     Alcohol/week: 4.0 standard drinks     Types: 4 Glasses of wine per week     Comment: socially   • Drug use: No   • Sexual activity: Not Currently     Partners: Male     Birth control/protection: Post-menopausal       Family History   Problem Relation Age of Onset   • Breast cancer Mother 50   • Emphysema Mother    • Macular  degeneration Mother    • Hypertension Mother    • Pancreatitis Mother    • Hypertension Father    • Dementia Father    • Parkinsonism Father    • Depression Brother    • Esophageal cancer Brother    • Hypertension Brother    • Melanoma Brother    • Lymphoma Maternal Grandmother    • Macular degeneration Maternal Grandmother    • Breast cancer Maternal Aunt    • Macular degeneration Maternal Aunt    • Breast cancer Maternal Aunt    • Macular degeneration Maternal Uncle    • Breast cancer Cousin    • Breast cancer Cousin    • Breast cancer Cousin    • Malig Hyperthermia Neg Hx    • Ovarian cancer Neg Hx        ROS:   Constitutional: denies any weight changes, fatigue or weakness.  Eyes: : denies blurred or double vision  Cardiovascular: denies chest pain, palpitations, edemas.  Respiratory: denies cough, sputum, SOB.  Gastrointestinal: denies N&V, reports abd pain, denies diarrhea, constipation.  Genitourinary: denies dysuria, frequency.  Endocrine: denies cold intolerance, lethargy and flushing.  Hem: denies excessive bruising and postop bleeding.  Musculoskeletal: denies weakness, joint swelling, pain or stiffness.  Neuro: denies seizures, CVA, paresthesia, or peripheral neuropathy.   Skin: denies change in nevi, rashes, masses.    Physical Exam:   Vitals:    03/16/23 1308   BP: 109/54   Pulse: 72   Resp: 18   Temp: 99 °F (37.2 °C)   SpO2: 98%     GENERAL:alert, well appearing, and in no distress and oriented to person, place, and time  HEENT: normochephalic, atraumatic, no scleral icterus moist mucous membranes.  NECK: Supple there is no thyromegaly or lymphadenopathy  CHEST: Breathing comfortable  CARDIAC: regular rate and rhythm    ABDOMEN: soft, mildly tender all throughout the abdomen, nondistended, no masses or organomegaly  EXTREMITIES: no cyanosis, clubbing or edema    NEURO: alert and oriented, normal speech, cranial nerves 2-12 grossly intact, no focal deficits   SKIN: Moist, warm, no rashes.    Diagnostic  workup:   CT abdomen and pelvis 3/16/2023: There were 3 different appendicolith at the base of the appendix.  There is mild dilation of the appendix and small periappendiceal stranding    CBC on admission 11.9 and today 15.3, hemoglobin 12.5, platelets 257, otherwise normal CBC  BMP within normal limits    Assessment and plan:   The patient is a very pleasant 62 y.o. years old female with clinical as well as imaging findings of acute appendicitis.  I discussed with the patient about further step and option for treatment that would include conservative management with antibiotic treatment versus laparoscopic appendectomy.  Risk and benefits of both approaches including approximately 50% failure with antibiotic therapy alone and the risk of bleeding, infections, intra-abdominal injuries, intra-abdominal abscess and stump leaks for laparoscopic appendectomy.  I do not think patient is candidate for conservative management so I recommend laparoscopic appendectomy.  Patient agreed    Plan:    - Laparoscopic appendectomy, possible open.   - NPO  - IVF  - IV antibiotics  - Consent for laparoscopic appendectomy possible open    Case was discussed with patient.    Risk and benefits of the current recommended treatment were explained to the patient that had time to ask questions that where answered, verbalized understanding and agreed with the plan.     Mich Duggan MD  General, Minimally Invasive and Endoscopic Surgery  [facility]    [address], [address]  [place], [address]  P: [phone]  F: [phone]

## 2023-03-16 NOTE — PROGRESS NOTES
[facility]acy Services: Piperacillin-Tazobactam Consult    Pt Name: Rakel Velarde   : 1960    Indication: Intra-Abdominal Infection    Relevant clinical data and objective history reviewed:    Past Medical History:   Diagnosis Date   • Angular cheilitis 2016   • Anxiety    • Coronary artery disease    • GERD (gastroesophageal reflux disease)    • Hallux valgus     followed by Ortho, Dr. Humphreys   • HSV infection     followed by OB/GYN, Dr. Maria Antonia Gray   • Hyperlipidemia    • Hypertension    • Obesity    • Osteopenia    • Ulcerative colitis (HCC)     followed by Dr. Carballo, Q2 yr colonoscopies   • Vitamin D deficiency      Creatinine   Date Value Ref Range Status   03/15/2023 0.71 0.57 - 1.00 mg/dL Final     BUN   Date Value Ref Range Status   03/15/2023 11 8 - 23 mg/dL Final     Estimated Creatinine Clearance: 88 mL/min (by C-G formula based on SCr of 0.71 mg/dL).    Lab Results   Component Value Date    WBC 11.91 (H) 03/15/2023     Temp Readings from Last 3 Encounters:   03/15/23 98.6 °F (37 °C)   22 97.5 °F (36.4 °C) (Oral)   22 98.4 °F (36.9 °C) (Oral)      Assessment/Plan  • Estimated CrCl >20 mL/min at this time; BMI 31.62 kg/m2  • Will start piperacillin-tazobactam 3.375 g IV every 8 hours     Pharmacy will continue to follow daily while on piperacillin-tazobactam and adjust as needed. Thank you for this consult.    Moira Leon [facility]  Clinical Pharmacist

## 2023-03-16 NOTE — PLAN OF CARE
Goal Outcome Evaluation:  Plan of Care Reviewed With: patient        Progress: no change  Outcome Evaluation: Admitted from ER with appendicitis. Oriented to room, equipment and plan of care. IV fluids and zosyn as ordered. NPO. Dilaudid x1 for pain. Patient reported itching afterwards. Toradol then given for pain with good results. VSS. Will monitor for needs.

## 2023-03-16 NOTE — ANESTHESIA PREPROCEDURE EVALUATION
Anesthesia Evaluation     Patient summary reviewed and Nursing notes reviewed   NPO Solid Status: > 8 hours  NPO Liquid Status: > 4 hours           Airway   Mallampati: II  Neck ROM: full  No difficulty expected  Dental      Comment: crowns    Pulmonary     breath sounds clear to auscultation  Cardiovascular     Rhythm: regular    (+) hypertension, CAD, hyperlipidemia,       Neuro/Psych  (+) psychiatric history Anxiety,    GI/Hepatic/Renal/Endo    (+) obesity,  GERD, GI bleeding ,     Musculoskeletal     Abdominal   (+) obese,    Substance History      OB/GYN          Other                        Anesthesia Plan    ASA 2     general     intravenous induction     Anesthetic plan, risks, benefits, and alternatives have been provided, discussed and informed consent has been obtained with: patient.        CODE STATUS:    Code Status (Patient has no pulse and is not breathing): CPR (Attempt to Resuscitate)  Medical Interventions (Patient has pulse or is breathing): Full Support

## 2023-03-16 NOTE — OP NOTE
PREOPERATIVE DIAGNOSIS:  Acute Appendicitis  POSTOPERATIVE DIAGNOSIS:  Acute suppurative appendicitis   PROCEDURE:  Laparoscopic Appendectomy  SURGEON/STAFF:  LORIN Duggan  ASSISTANT: Jelly Baca CSA dosing charge of retraction, exposure, holding camera, closing wounds and placing dressings that was essential for the success of the case  ANESTHESIA:  General.   BLOOD LOSS: Minimal  COUNTS:  Needle and sponge count correct.  SPECIMENS:  Appendix    Findings: Acute appendicitis    INDICATIONS FOR OPERATION:  Rakel Velarde is a 62 y.o. year old female who presented to to the ED for evaluation of abdominal pain.  She was found to have acute appendicitis on imaging.  She was examined and she was found to have findings consistent with acute appendicitis.  I discussed with the patient about treatment options that would include conservative management with antibiotics versus laparoscopic appendectomy.  Risk and benefits of both approaches were discussed in detail with the patient including the possibility of 50% failure of antibiotic only treatment versus the risk of bleeding, infection, abscess formation, intra-abdominal injuries, stump leak and the risk of anesthesia. After considering risk and benefits the patient has decided to proceed with laparoscopic appendectomy.    OPERATION:  The patient was brought to the operating room in stable condition.   Preoperative antibiotics were given and sequential compression devices were applied.  At this time, the patient was laid supine on the operating room table.  General anesthesia was induced by the Anesthesia service without difficulty. A llamas catheter was placed by the nursing staff.  The patient's abdomen was prepped and draped in the usual sterile fashion.      At this time, the patient's abdomen was accessed at the level of the umbilicus with an open cutdown technique and insertion of a 5 mm trocar.  The abdomen was insufflated.  Brief survey of the abdominal cavity  revealed no intra-abdominal injury, and a large inflamed appendix.  The operation was continued by insertion of 2 additional 5 mm trocars, one in the mid clavicular line between the asis and umbilicus, and one in the suprapubic region.  The umbilical 5 mm port was replaced with a 12 mm port to allow for specimen removal and stapler passage. These were inserted under direct view.  The appendix was inflamed .  The retroappendiceal window was created at the base of the appendix. A single firing of a Holcombe Laparoscopic stapler with a white load was used to transect the appendix at it's base. Here there was viable tissue, that was soft to touch.     Harmonic ultrasonic tomeka was used to transect mesentery of the appendix .  The appendix was perforated posteriorly into the mesentery so there was small amount of spillage of stool.  This was aspirated.  There was a single adhesion from the appendix to the small bowel that was transected with ultrasonic tomeka.  The appendix was then removed through the umbilical port site with the aid of an endo catch bag.  There was small amount of bleeding from the mesoappendix that was controlled with Bovie electrocautery.  Alhaji hemostatic agent was placed over the surgical bed.   Next, the abdomen was inspected and irrigated.  The field was completely clean with no evidence of intra-abdominal injury or bleeding.  All trocars were then removed under direct vision.  The umbilical fascia was closed with Vicryl suture.  All skin incisions were closed with 4-0 Monocryl and surgical glue.      The patient was extubated in the recovery room in stable condition.  I, the attending surgeon, performed the entire operation.    Mich Duggan MD  General, Minimally Invasive and Endoscopic Surgery  [facility]    [address] [place] [address], [address]  [place], [place], [address]  P: [phone]  F: [phone]

## 2023-03-16 NOTE — ANESTHESIA POSTPROCEDURE EVALUATION
"Patient: Rakel Velarde    Procedure Summary     Date: 03/16/23 Room / Location: [facility] OR  / [facility] MAIN OR    Anesthesia Start: 1458 Anesthesia Stop: 1616    Procedure: APPENDECTOMY LAPAROSCOPIC (Abdomen) Diagnosis:     Surgeons: Mich Duggan MD Provider: Juliet Mina MD    Anesthesia Type: general ASA Status: 2          Anesthesia Type: general    Vitals  Vitals Value Taken Time   /51 03/16/23 1656   Temp 37.2 °C (99 °F) 03/16/23 1651   Pulse 72 03/16/23 1704   Resp 16 03/16/23 1655   SpO2 94 % 03/16/23 1704   Vitals shown include unvalidated device data.        Post Anesthesia Care and Evaluation    Patient location during evaluation: bedside  Patient participation: complete - patient participated  Level of consciousness: awake and alert  Pain management: adequate    Airway patency: patent  Anesthetic complications: No anesthetic complications    Cardiovascular status: acceptable  Respiratory status: acceptable  Hydration status: acceptable    Comments: /51   Pulse 71   Temp 37.2 °C (99 °F) (Oral)   Resp 16   Ht 165.1 cm (65\")   Wt 86.2 kg (190 lb)   LMP  (LMP Unknown)   SpO2 94%   BMI 31.62 kg/m²         "

## 2023-03-16 NOTE — PLAN OF CARE
Goal Outcome Evaluation:           Progress: declining  Outcome Evaluation: Pt went down to surgery this afternoon. When she returned to the floor her VSS and she states her pain is well controlled with the Toradol. LR @ 100 mL/hr and IV antibiotics per orders. Will CTM.

## 2023-03-16 NOTE — ED NOTES
"Nursing report ED to floor  Rakel Velarde  62 y.o.  female    HPI :   Chief Complaint   Patient presents with    Abdominal Pain       Admitting doctor:   Mich Duggan MD    Admitting diagnosis:   The primary encounter diagnosis was Acute appendicitis, unspecified acute appendicitis type. Diagnoses of Obesity (BMI 30-39.9) and Primary hypertension were also pertinent to this visit.    Code status:   Current Code Status       Date Active Code Status Order ID Comments User Context       3/15/2023 2231 CPR (Attempt to Resuscitate) [id number]  Mich Duggan MD ED        Question Answer    Code Status (Patient has no pulse and is not breathing) CPR (Attempt to Resuscitate)    Medical Interventions (Patient has pulse or is breathing) Full Support                    Allergies:   Lisinopril    Isolation:   No active isolations    Intake and Output  No intake or output data in the 24 hours ending 03/15/23 2301    Weight:       03/15/23  1710   Weight: 86.2 kg (190 lb)       Most recent vitals:   Vitals:    03/15/23 1710 03/15/23 2101 03/15/23 2102 03/15/23 2243   BP: 160/98 153/77  144/73   Pulse:   64 66   Resp: 16      Temp:       SpO2:   96% 96%   Weight: 86.2 kg (190 lb)      Height: 165.1 cm (65\")          Active LDAs/IV Access:   Lines, Drains & Airways       Active LDAs       Name Placement date Placement time Site Days    Peripheral IV 03/15/23 2025 Right Antecubital 03/15/23  2025  Antecubital  less than 1                    Labs (abnormal labs have a star):   Labs Reviewed   COMPREHENSIVE METABOLIC PANEL - Abnormal; Notable for the following components:       Result Value    Glucose 101 (*)     Chloride 97 (*)     All other components within normal limits    Narrative:     GFR Normal >60  Chronic Kidney Disease <60  Kidney Failure <15     URINALYSIS W/ MICROSCOPIC IF INDICATED (NO CULTURE) - Abnormal; Notable for the following components:    Appearance, UA Cloudy (*)     Blood, UA Small (1+) " (*)     Leuk Esterase, UA Trace (*)     All other components within normal limits   CBC WITH AUTO DIFFERENTIAL - Abnormal; Notable for the following components:    WBC 11.91 (*)     Neutrophils, Absolute 8.25 (*)     All other components within normal limits   URINALYSIS, MICROSCOPIC ONLY - Abnormal; Notable for the following components:    Bacteria, UA 1+ (*)     Squamous Epithelial Cells, UA 13-20 (*)     All other components within normal limits   LIPASE - Normal   SINGLE HSTROPONIN T - Normal    Narrative:     High Sensitive Troponin T Reference Range:  <10.0 ng/L- Negative Female for AMI  <15.0 ng/L- Negative Male for AMI  >=10 - Abnormal Female indicating possible myocardial injury.  >=15 - Abnormal Male indicating possible myocardial injury.   Clinicians would have to utilize clinical acumen, EKG, Troponin, and serial changes to determine if it is an Acute Myocardial Infarction or myocardial injury due to an underlying chronic condition.        RAINBOW DRAW    Narrative:     The following orders were created for panel order [place] Draw.  Procedure                               Abnormality         Status                     ---------                               -----------         ------                     Green Top (Gel)[[id number]]                                  Final result               Lavender Top[[id number]]                                     Final result               Gold Top - SST[[id number]]                                   Final result               Light Blue Top[[id number]]                                   Final result                 Please view results for these tests on the individual orders.   CBC AND DIFFERENTIAL    Narrative:     The following orders were created for panel order CBC & Differential.  Procedure                               Abnormality         Status                     ---------                               -----------         ------                     CBC Auto  Differential[[id number]]        Abnormal            Final result                 Please view results for these tests on the individual orders.   GREEN TOP   LAVENDER TOP   GOLD TOP - SST   LIGHT BLUE TOP       EKG:   ECG 12 Lead ED Triage Standing Order; Abdominal Pain (Upper)   Final Result   HEART RATE= 61  bpm   RR Interval= 984  ms   MT Interval= 164  ms   P Horizontal Axis= -8  deg   P Front Axis= 43  deg   QRSD Interval= 93  ms   QT Interval= 435  ms   QRS Axis= 14  deg   T Wave Axis= 55  deg   - ABNORMAL ECG -   Sinus rhythm   Low voltage, precordial leads   No change from previous tracing   Electronically Signed By: Arben Gonzalez ([facility]) 15-Mar-2023 19:56:03   Date and Time of Study: 2023-03-15 17:14:03          Meds given in ED:   Medications   sodium chloride 0.9 % flush 10 mL (has no administration in time range)   piperacillin-tazobactam (ZOSYN) 3.375 g in iso-osmotic dextrose 50 ml (premix) (3.375 g Intravenous New Bag 3/15/23 2240)   morphine injection 4 mg (4 mg Intravenous Given 3/15/23 2035)   ondansetron (ZOFRAN) injection 4 mg (4 mg Intravenous Given 3/15/23 2026)   iopamidol (ISOVUE-300) 61 % injection 100 mL (85 mL Intravenous Given 3/15/23 2117)   morphine injection 4 mg (4 mg Intravenous Given 3/15/23 2240)       Imaging results:  No radiology results for the last day    Ambulatory status:   - ad mala      Social issues:   Social History     Socioeconomic History    Marital status:    Tobacco Use    Smoking status: Never    Smokeless tobacco: Never   Vaping Use    Vaping Use: Never used   Substance and Sexual Activity    Alcohol use: Yes     Alcohol/week: 4.0 standard drinks     Types: 4 Glasses of wine per week     Comment: socially    Drug use: No    Sexual activity: Not Currently     Partners: Male     Birth control/protection: Post-menopausal       NIH Stroke Scale:         Sofie Monteiro RN  03/15/23 23:01 EDT

## 2023-03-17 LAB
BASOPHILS # BLD AUTO: 0.02 10*3/MM3 (ref 0–0.2)
BASOPHILS NFR BLD AUTO: 0.1 % (ref 0–1.5)
DEPRECATED RDW RBC AUTO: 41.2 FL (ref 37–54)
EOSINOPHIL # BLD AUTO: 0.01 10*3/MM3 (ref 0–0.4)
EOSINOPHIL NFR BLD AUTO: 0.1 % (ref 0.3–6.2)
ERYTHROCYTE [DISTWIDTH] IN BLOOD BY AUTOMATED COUNT: 12.5 % (ref 12.3–15.4)
HCT VFR BLD AUTO: 31.8 % (ref 34–46.6)
HGB BLD-MCNC: 11 G/DL (ref 12–15.9)
IMM GRANULOCYTES # BLD AUTO: 0.11 10*3/MM3 (ref 0–0.05)
IMM GRANULOCYTES NFR BLD AUTO: 0.7 % (ref 0–0.5)
LYMPHOCYTES # BLD AUTO: 1.56 10*3/MM3 (ref 0.7–3.1)
LYMPHOCYTES NFR BLD AUTO: 9.7 % (ref 19.6–45.3)
MCH RBC QN AUTO: 31 PG (ref 26.6–33)
MCHC RBC AUTO-ENTMCNC: 34.6 G/DL (ref 31.5–35.7)
MCV RBC AUTO: 89.6 FL (ref 79–97)
MONOCYTES # BLD AUTO: 0.78 10*3/MM3 (ref 0.1–0.9)
MONOCYTES NFR BLD AUTO: 4.8 % (ref 5–12)
NEUTROPHILS NFR BLD AUTO: 13.68 10*3/MM3 (ref 1.7–7)
NEUTROPHILS NFR BLD AUTO: 84.6 % (ref 42.7–76)
NRBC BLD AUTO-RTO: 0 /100 WBC (ref 0–0.2)
PLATELET # BLD AUTO: 224 10*3/MM3 (ref 140–450)
PMV BLD AUTO: 10.7 FL (ref 6–12)
RBC # BLD AUTO: 3.55 10*6/MM3 (ref 3.77–5.28)
WBC NRBC COR # BLD: 16.16 10*3/MM3 (ref 3.4–10.8)

## 2023-03-17 PROCEDURE — G0378 HOSPITAL OBSERVATION PER HR: HCPCS

## 2023-03-17 PROCEDURE — 25010000002 PIPERACILLIN SOD-TAZOBACTAM PER 1 G: Performed by: SURGERY

## 2023-03-17 PROCEDURE — 99024 POSTOP FOLLOW-UP VISIT: CPT | Performed by: SURGERY

## 2023-03-17 PROCEDURE — 85025 COMPLETE CBC W/AUTO DIFF WBC: CPT | Performed by: SURGERY

## 2023-03-17 RX ADMIN — TAZOBACTAM SODIUM AND PIPERACILLIN SODIUM 3.38 G: 375; 3 INJECTION, SOLUTION INTRAVENOUS at 05:13

## 2023-03-17 RX ADMIN — CITALOPRAM 20 MG: 20 TABLET, FILM COATED ORAL at 09:31

## 2023-03-17 RX ADMIN — SODIUM CHLORIDE, POTASSIUM CHLORIDE, SODIUM LACTATE AND CALCIUM CHLORIDE 100 ML/HR: 600; 310; 30; 20 INJECTION, SOLUTION INTRAVENOUS at 11:32

## 2023-03-17 RX ADMIN — MESALAMINE 250 MG: 250 CAPSULE ORAL at 09:31

## 2023-03-17 RX ADMIN — TAZOBACTAM SODIUM AND PIPERACILLIN SODIUM 3.38 G: 375; 3 INJECTION, SOLUTION INTRAVENOUS at 14:13

## 2023-03-17 RX ADMIN — ESTRADIOL 0.5 MG: 0.5 TABLET ORAL at 09:31

## 2023-03-17 RX ADMIN — PANTOPRAZOLE SODIUM 40 MG: 40 TABLET, DELAYED RELEASE ORAL at 05:13

## 2023-03-17 RX ADMIN — MESALAMINE 250 MG: 250 CAPSULE ORAL at 17:53

## 2023-03-17 RX ADMIN — SODIUM CHLORIDE, POTASSIUM CHLORIDE, SODIUM LACTATE AND CALCIUM CHLORIDE 100 ML/HR: 600; 310; 30; 20 INJECTION, SOLUTION INTRAVENOUS at 00:46

## 2023-03-17 RX ADMIN — OXYCODONE AND ACETAMINOPHEN 1 TABLET: 5; 325 TABLET ORAL at 17:53

## 2023-03-17 RX ADMIN — HYDROCHLOROTHIAZIDE 50 MG: 50 TABLET ORAL at 09:31

## 2023-03-17 RX ADMIN — TAZOBACTAM SODIUM AND PIPERACILLIN SODIUM 3.38 G: 375; 3 INJECTION, SOLUTION INTRAVENOUS at 20:23

## 2023-03-17 RX ADMIN — Medication 3 ML: at 20:23

## 2023-03-17 NOTE — PROGRESS NOTES
Postop day 1 status post laparoscopic appendectomy.    She is feeling fine and denies any complaint Other than abdominal bloating.  Has been able to tolerate regular diet.    Vitals:    03/16/23 2047 03/17/23 0134 03/17/23 0635 03/17/23 0931   BP: 109/64 100/57 105/55 113/64   BP Location: Left arm Left arm Left arm Left arm   Patient Position: Lying Lying Lying Lying   Pulse: 72 74 70 67   Resp: 16 16 16 16   Temp: 97.9 °F (36.6 °C) 97 °F (36.1 °C) 98 °F (36.7 °C) 97.2 °F (36.2 °C)   TempSrc: Oral Oral Oral Oral   SpO2: 94% 93% 94% 96%   Weight:       Height:         Alert, no acute distress  Breathing comfortable  Regular rate rhythm  Abdomen soft, appropriate tender, mildly distended, incisions clean dry and intact    White blood cell count 16,000 from 15, uptrending,  Hemoglobin slightly down to 11.    Assessment and plan    Postop day 1 status post laparoscopic appendectomy.  She is feeling better and tolerating diet.  White blood cell count uptrending likely from postoperative inflammatory response.  Discussed with the patient about the need to stay in the hospital on IV antibiotics until white blood cell count starts downtrending.  She will be discharged home on 5 days of antibiotics.    Continue regular diet, DC IV fluids

## 2023-03-17 NOTE — PLAN OF CARE
Goal Outcome Evaluation:           Progress: declining  Outcome Evaluation: Pt A&Ox4.On RA. IVF D/C. Up ambulating independently. VSS. No PRNs this shift--minimal pain. Tolerating regular diet. IV abx continued per order. D/C tomorrow. WIll cont to monitor

## 2023-03-18 ENCOUNTER — READMISSION MANAGEMENT (OUTPATIENT)
Dept: CALL CENTER | Facility: HOSPITAL | Age: 63
End: 2023-03-18
Payer: MEDICAID

## 2023-03-18 VITALS
SYSTOLIC BLOOD PRESSURE: 108 MMHG | DIASTOLIC BLOOD PRESSURE: 65 MMHG | WEIGHT: 190 LBS | BODY MASS INDEX: 31.65 KG/M2 | TEMPERATURE: 98.3 F | HEART RATE: 71 BPM | RESPIRATION RATE: 16 BRPM | OXYGEN SATURATION: 95 % | HEIGHT: 65 IN

## 2023-03-18 LAB
BASOPHILS # BLD AUTO: 0.06 10*3/MM3 (ref 0–0.2)
BASOPHILS NFR BLD AUTO: 0.5 % (ref 0–1.5)
DEPRECATED RDW RBC AUTO: 42.9 FL (ref 37–54)
EOSINOPHIL # BLD AUTO: 0.15 10*3/MM3 (ref 0–0.4)
EOSINOPHIL NFR BLD AUTO: 1.2 % (ref 0.3–6.2)
ERYTHROCYTE [DISTWIDTH] IN BLOOD BY AUTOMATED COUNT: 12.5 % (ref 12.3–15.4)
HCT VFR BLD AUTO: 33.1 % (ref 34–46.6)
HGB BLD-MCNC: 11.1 G/DL (ref 12–15.9)
IMM GRANULOCYTES # BLD AUTO: 0.06 10*3/MM3 (ref 0–0.05)
IMM GRANULOCYTES NFR BLD AUTO: 0.5 % (ref 0–0.5)
LAB AP CASE REPORT: NORMAL
LYMPHOCYTES # BLD AUTO: 2.72 10*3/MM3 (ref 0.7–3.1)
LYMPHOCYTES NFR BLD AUTO: 21.2 % (ref 19.6–45.3)
MCH RBC QN AUTO: 31.3 PG (ref 26.6–33)
MCHC RBC AUTO-ENTMCNC: 33.5 G/DL (ref 31.5–35.7)
MCV RBC AUTO: 93.2 FL (ref 79–97)
MONOCYTES # BLD AUTO: 0.88 10*3/MM3 (ref 0.1–0.9)
MONOCYTES NFR BLD AUTO: 6.8 % (ref 5–12)
NEUTROPHILS NFR BLD AUTO: 69.8 % (ref 42.7–76)
NEUTROPHILS NFR BLD AUTO: 8.98 10*3/MM3 (ref 1.7–7)
NRBC BLD AUTO-RTO: 0 /100 WBC (ref 0–0.2)
PATH REPORT.FINAL DX SPEC: NORMAL
PATH REPORT.GROSS SPEC: NORMAL
PLATELET # BLD AUTO: 220 10*3/MM3 (ref 140–450)
PMV BLD AUTO: 10.9 FL (ref 6–12)
RBC # BLD AUTO: 3.55 10*6/MM3 (ref 3.77–5.28)
WBC NRBC COR # BLD: 12.85 10*3/MM3 (ref 3.4–10.8)

## 2023-03-18 PROCEDURE — 25010000002 PIPERACILLIN SOD-TAZOBACTAM PER 1 G: Performed by: SURGERY

## 2023-03-18 PROCEDURE — G0378 HOSPITAL OBSERVATION PER HR: HCPCS

## 2023-03-18 PROCEDURE — 99024 POSTOP FOLLOW-UP VISIT: CPT | Performed by: SURGERY

## 2023-03-18 PROCEDURE — 85025 COMPLETE CBC W/AUTO DIFF WBC: CPT | Performed by: SURGERY

## 2023-03-18 RX ORDER — OXYCODONE HYDROCHLORIDE AND ACETAMINOPHEN 5; 325 MG/1; MG/1
1 TABLET ORAL EVERY 6 HOURS PRN
Qty: 10 TABLET | Refills: 0 | Status: SHIPPED | OUTPATIENT
Start: 2023-03-18 | End: 2023-03-23

## 2023-03-18 RX ORDER — AMOXICILLIN AND CLAVULANATE POTASSIUM 875; 125 MG/1; MG/1
1 TABLET, FILM COATED ORAL 2 TIMES DAILY
Qty: 10 TABLET | Refills: 0 | Status: SHIPPED | OUTPATIENT
Start: 2023-03-18 | End: 2023-04-07

## 2023-03-18 RX ADMIN — TAZOBACTAM SODIUM AND PIPERACILLIN SODIUM 3.38 G: 375; 3 INJECTION, SOLUTION INTRAVENOUS at 04:48

## 2023-03-18 RX ADMIN — OXYCODONE AND ACETAMINOPHEN 1 TABLET: 5; 325 TABLET ORAL at 00:43

## 2023-03-18 RX ADMIN — Medication 3 ML: at 08:29

## 2023-03-18 RX ADMIN — HYDROCHLOROTHIAZIDE 50 MG: 50 TABLET ORAL at 08:29

## 2023-03-18 RX ADMIN — MESALAMINE 250 MG: 250 CAPSULE ORAL at 06:48

## 2023-03-18 RX ADMIN — CITALOPRAM 20 MG: 20 TABLET, FILM COATED ORAL at 08:29

## 2023-03-18 RX ADMIN — ESTRADIOL 0.5 MG: 0.5 TABLET ORAL at 08:29

## 2023-03-18 RX ADMIN — OXYCODONE AND ACETAMINOPHEN 1 TABLET: 5; 325 TABLET ORAL at 08:31

## 2023-03-18 RX ADMIN — PANTOPRAZOLE SODIUM 40 MG: 40 TABLET, DELAYED RELEASE ORAL at 06:48

## 2023-03-18 NOTE — PLAN OF CARE
Goal Outcome Evaluation:  Plan of Care Reviewed With: patient        Progress: improving  Outcome Evaluation: Pt up ad mala, ambulating in room and in hallway. Norco 5 given x1 through the night for abd pain. Pt passing flatus throught he night, no BM thus far. IV antibiotics as ordered. Possible DC home today. Will continue to monitor.

## 2023-03-18 NOTE — DISCHARGE SUMMARY
GENERAL SURGERY      DATE OF ADMISSION:  3/15/23  DATE OF DISCHARGE:  3/18/2023    ATTENDING:    Mich Duggan MD           CONSULTS:    None    PRINCIPAL DIAGNOSIS:     Acute appendicitis    DISCHARGE DIAGNOSES:     Same    HOSPITAL PROCEDURES:     Laparoscopic appendectomy    HOSPITAL COURSE:   Had perforated appendicitis and was kept for IV antibiotics until leukocytosis improved.  Today, she was feeling well, tolerating a diet and having bowel function.  Her leukocytosis decreased to 12.8 from 16 yesterday.  She was deemed appropriate for discharge home.    ACTIVITY:  Okay to shower.  Ambulate and climb stairs as tolerated.  No lifting over 10 lbs for 2 weeks.  Okay to drive if not taking pain medications.    DIET:  Regular diet as tolerated    FOLLOW UP:  With Dr. Duggan in 2 weeks. Instructed to call [phone] for an appointment.

## 2023-03-18 NOTE — OUTREACH NOTE
Prep Survey    Flowsheet Row Responses   [facility] patient discharged from? [place]   Is LACE score < 7 ? Yes   Eligibility [facility]   Date of Admission 03/15/23   Date of Discharge 03/18/23   Discharge Disposition Home or Self Care   Discharge diagnosis APPENDECTOMY LAPAROSCOPIC   Does the patient have one of the following disease processes/diagnoses(primary or secondary)? General Surgery   Does the patient have Home health ordered? No   Is there a DME ordered? No   Prep survey completed? Yes          Ladonna BRONSON - Registered Nurse

## 2023-03-18 NOTE — DISCHARGE INSTRUCTIONS
POST OP RECOMMENDATIONS  Dr. Mich Duggan  [phone]  Discharge Laparoscopic Appendectomy    Go home, rest and take it easy today; however, you should get up and move about several times today to reduce the risk of developing a blood clot in your legs.   You may experience some dizziness or memory loss from the anesthesia. This may last for the next 24 hours. Someone should plan on staying with you for the first 24 hours for your safety.   Do not make any important legal decisions or sign any legal papers for the next 24 hours.   Eat and drink lightly today. Start off with liquids, jello, soup, crackers or other bland foods at first. You may advance your diet tomorrow as tolerated as long as you do not experience any nausea or vomiting.   You may remove your outer dressings in 3 days. The white tapes called steri-strips should stay in place. They will fall off on their own in 1-2 weeks. Do not worry if they come off sooner.   You may notice some bleeding/drainage on your outer dressings. A little bloody drainage is normal. If the bleeding/drainage is such that the bandage cannot absorb it, remove the dressing, apply clean gauze and apply firm pressure for a full 15 minutes. If the bleeding continues, please call me.   You may shower tomorrow. No tub baths until your incisions are completely healed.   You have received a prescription for a narcotic pain medicine, as you will have some pain following surgery.  You will not be totally pain free, but your pain medicine should make the pain tolerable. Please take your pain medicine as prescribed and always take your pills with food to prevent nausea. If you are having severe pain that cannot be controlled by the pain medicine, please contact me.   You have also received a prescription for an anti-nausea medicine. Please take this as prescribed for any nausea or vomiting. Nausea could be a result of the anesthesia or a result of the narcotic pain medicine. If you  experience severe nausea and vomiting that cannot be controlled by the nausea medicine, please call me.   It is not unusual to experience pain/discomfort in your shoulders or your ribs after surgery. It is from the gas used during the laparoscopic procedure and usually lasts 1-3 days. The prescription pain medicine is used to treat the surgical pain and does not typically alleviate this “gassy” pain.   No driving for 24 hours and for as long as you are taking your prescription pain medicine.   You will need to call the office at [phone] to schedule a follow-up appointment in 6-10 days.   Remember to contact me for any of the following:   Fever > 101 degrees  Severe pain that cannot be controlled by taking your pain pills  Severe nausea or vomiting that cannot be controlled by taking your nausea pills  Significant bleeding of your incisions  Drainage that has a bad smell or is yellow or green in appearance  Any other questions or concerns

## 2023-03-20 ENCOUNTER — TRANSITIONAL CARE MANAGEMENT TELEPHONE ENCOUNTER (OUTPATIENT)
Dept: CALL CENTER | Facility: HOSPITAL | Age: 63
End: 2023-03-20
Payer: MEDICAID

## 2023-03-20 NOTE — OUTREACH NOTE
Call Center TCM Note    Flowsheet Row Responses   [facility] patient discharged from? [place]   Does the patient have one of the following disease processes/diagnoses(primary or secondary)? General Surgery   TCM attempt successful? Yes   Call start time [phone]   Call end time [phone]   Discharge diagnosis APPENDECTOMY LAPAROSCOPIC   Meds reviewed with patient/caregiver? Yes   Is the patient having any side effects they believe may be caused by any medication additions or changes? No   Does the patient have all medications related to this admission filled (includes all antibiotics, pain medications, etc.) Yes   Is the patient taking all medications as directed (includes completed medication regime)? Yes   Does the patient have an appointment with their PCP within 7 days of discharge? No   Nursing Interventions Patient declined scheduling/rescheduling appointment at this time   Has home health visited the patient within 72 hours of discharge? N/A   Psychosocial issues? No   Did the patient receive a copy of their discharge instructions? Yes   Nursing interventions Reviewed instructions with patient   What is the patient's perception of their health status since discharge? Improving   Nursing interventions Nurse provided patient education   Is the patient /caregiver able to teach back basic post-op care? Continue use of incentive spirometry at least 1 week post discharge, Take showers only when approved by MD-sponge bathe until then, No tub bath, swimming, or hot tub until instructed by MD, Keep incision areas clean,dry and protected, Lifting as instructed by MD in discharge instructions   Is the patient/caregiver able to teach back signs and symptoms of incisional infection? Increased redness, swelling or pain at the incisonal site, Increased drainage or bleeding   Is the patient/caregiver able to teach back steps to recovery at home? Set small, achievable goals for return to baseline health   TCM call completed?  Yes   Call end time [phone]   Would this patient benefit from a Referral to [facility] Social Work? No   Is the patient interested in additional calls from an ambulatory ?  NOTE:  applies to high risk patients requiring additional follow-up. No          Peri Martinez RN    3/20/2023, 08:44 EDT

## 2023-03-20 NOTE — PROGRESS NOTES
Case Management Discharge Note      Final Note: Discharged to home on 3/18/23. ANDRE Carias RN CCP         Selected Continued Care - Discharged on 3/18/2023 Admission date: 3/15/2023 - Discharge disposition: Home or Self Care    Destination    No services have been selected for the patient.              Durable Medical Equipment    No services have been selected for the patient.              Dialysis/Infusion    No services have been selected for the patient.              Home Medical Care    No services have been selected for the patient.              Therapy    No services have been selected for the patient.              Community Resources    No services have been selected for the patient.              Community & DME    No services have been selected for the patient.                  Transportation Services  Private: Car    Final Discharge Disposition Code: 01 - home or self-care

## 2023-03-22 ENCOUNTER — TELEPHONE (OUTPATIENT)
Dept: SURGERY | Facility: CLINIC | Age: 63
End: 2023-03-22
Payer: MEDICAID

## 2023-03-22 NOTE — TELEPHONE ENCOUNTER
Patient called stating she is having a hard time moving her bowels s/p lap appy 3/16. Patient states the last time she had a bowel movement was on Sunday but it was painful. Advised that she can try taking Miralax and that she can also take OTC stool softeners. Advised that if those do not seem to help she can try Milk of Magnesia. Patient verbalized understanding.

## 2023-03-30 ENCOUNTER — TELEPHONE (OUTPATIENT)
Dept: SURGERY | Facility: CLINIC | Age: 63
End: 2023-03-30
Payer: MEDICAID

## 2023-03-30 NOTE — TELEPHONE ENCOUNTER
Patient called to report drainage from her umbilical incision s/p lap appy 3/16/23. Pt states the drainage has a yellow tint. Pt reports some redness and that the umbilical incision has been sore. No fever or odor. Patient does have a post-op appointment scheduled tomorrow but I instructed pt to send a picture via [employer] so that I can send it to Dr. Duggan. Pt verbalized understanding.

## 2023-03-31 ENCOUNTER — OFFICE VISIT (OUTPATIENT)
Dept: SURGERY | Facility: CLINIC | Age: 63
End: 2023-03-31
Payer: MEDICAID

## 2023-03-31 VITALS — BODY MASS INDEX: 31.65 KG/M2 | WEIGHT: 190 LBS | HEIGHT: 65 IN

## 2023-03-31 DIAGNOSIS — K35.32 ACUTE APPENDICITIS WITH PERFORATION AND LOCALIZED PERITONITIS, WITHOUT ABSCESS OR GANGRENE: Primary | ICD-10-CM

## 2023-03-31 PROCEDURE — 1159F MED LIST DOCD IN RCRD: CPT

## 2023-03-31 PROCEDURE — 99024 POSTOP FOLLOW-UP VISIT: CPT

## 2023-03-31 PROCEDURE — 1160F RVW MEDS BY RX/DR IN RCRD: CPT

## 2023-03-31 NOTE — PROGRESS NOTES
CC: post op    History of Presenting Illness:   This is a 62 year old female who presents today for a post op appointment. She recently underwent a laparoscopic appendectomy. She reports she is doing well since surgery. Denies any fevers or chills. She has had some abdominal soreness. Denies any nausea or vomiting. Reports normal bowel movements. Her appetite is good. She states on Wednesday night she began having drainage from her belly button incision. She describes it as clear/nina in color. Denies any odor.     Surgical History: Laparoscopic Appendectomy 3/16/2023 Dr. Duggan    Pathology:   Final Diagnosis   1. Appendix, Appendectomy: Benign appendix with                A. Acute appendicitis.               B. Serositis.  COMMENT:  There is full thickness necrosis and grossly an area of disruption. This suggests perforation. Recommend clinical correlation.     Review of Systems:  Constitutional: negative for fevers or chills   Respiratory: negative for SOB, cough, hemoptysis or wheezing  Cardiovascular: negative for chest pain, palpitations or peripheral edema  Gastrointestinal: positive for abdominal soreness near incision sites, negative for nausea, vomiting, diarrhea, or constipation     Physical Exam:  BMI: 31.62  General: alert and oriented, appropriate, no acute distress  Respiratory: There is good bilateral chest expansion, no use of accessory muscles is noted  Cardiovascular: No jugular venous distention or peripheral edema is seen  Gastrointestinal: soft, non-tender, incisions are healing, umbilical incision open with minimal serous drainage and minimal surrounding erythema     Assessment and Plan:   62-year-old female status post laparoscopic appendectomy for acute appendicitis with perforation.  She is doing well since surgery.  Her incisions are healing nicely.  Her umbilical incision does appear to have opened and has had serous drainage.  There is mild surrounding erythema, no odor noted.  No  clinical signs of infection.  Dr. Duggna and I discussed with her that this was likely a seroma that has evacuated.  Discussed she should place antibiotic ointment over the incision and cover with a bandage. Activity restrictions discussed. Follow-up in 1 week for a wound check. Call the office sooner with any concerns.     Bridgett Lowe PA-C  General Surgery    [facility]  [address], [address]  [place], [place], [address]  P: [phone]  F: [phone]

## 2023-04-07 ENCOUNTER — OFFICE VISIT (OUTPATIENT)
Dept: SURGERY | Facility: CLINIC | Age: 63
End: 2023-04-07
Payer: MEDICAID

## 2023-04-07 VITALS — BODY MASS INDEX: 31.52 KG/M2 | HEIGHT: 65 IN | WEIGHT: 189.2 LBS

## 2023-04-07 DIAGNOSIS — Z51.89 VISIT FOR WOUND CHECK: Primary | ICD-10-CM

## 2023-04-07 PROCEDURE — 99024 POSTOP FOLLOW-UP VISIT: CPT | Performed by: PHYSICIAN ASSISTANT

## 2023-04-07 PROCEDURE — 1159F MED LIST DOCD IN RCRD: CPT | Performed by: PHYSICIAN ASSISTANT

## 2023-04-07 PROCEDURE — 1160F RVW MEDS BY RX/DR IN RCRD: CPT | Performed by: PHYSICIAN ASSISTANT

## 2023-04-07 NOTE — PROGRESS NOTES
CC:  Wound check    S:  62-year-old lady returning to the office today after having undergone a laparoscopic appendectomy on 3/16/2023 and being seen in follow-up with drainage from her umbilical incision which had opened up.  She states that overall it has improved though she does feel as if there is something protruding from her navel.  The drainage is minimal now and she has minimal erythema.  She is continuing to clean the area appropriately and placed triple antibiotic ointment on the area as recommended.    O:  Her umbilical incision is minimally erythematous without purulent drainage.  There is a Monocryl stitch protruding from the incision with exudate on it.  Nontender, no seroma, no signs of infection.    A/P:  I removed the exposed stitch and cleaned the wound and then applied triple antibiotic ointment.  I informed her that this should continue to heal without issue and that she need only return on an as-needed basis at this point.  She states she is doing well without any difficulties from her surgery now.  All questions were answered and she was willing to proceed with all recommendations.    Jarod Tan PA-C

## 2023-04-17 RX ORDER — FLUTICASONE PROPIONATE 0.05 %
CREAM (GRAM) TOPICAL
Qty: 60 G | Refills: 0 | Status: SHIPPED | OUTPATIENT
Start: 2023-04-17

## 2023-06-12 RX ORDER — VALACYCLOVIR HYDROCHLORIDE 500 MG/1
500 TABLET, FILM COATED ORAL DAILY
Qty: 90 TABLET | Refills: 3 | Status: SHIPPED | OUTPATIENT
Start: 2023-06-12

## 2023-06-14 RX ORDER — FLUTICASONE PROPIONATE 0.05 %
CREAM (GRAM) TOPICAL
Qty: 60 G | Refills: 0 | Status: SHIPPED | OUTPATIENT
Start: 2023-06-14

## 2023-06-19 ENCOUNTER — OFFICE VISIT (OUTPATIENT)
Dept: GASTROENTEROLOGY | Facility: CLINIC | Age: 63
End: 2023-06-19
Payer: MEDICAID

## 2023-06-19 VITALS
BODY MASS INDEX: 32.46 KG/M2 | HEART RATE: 65 BPM | WEIGHT: 194.8 LBS | SYSTOLIC BLOOD PRESSURE: 135 MMHG | TEMPERATURE: 96.4 F | OXYGEN SATURATION: 97 % | HEIGHT: 65 IN | DIASTOLIC BLOOD PRESSURE: 77 MMHG

## 2023-06-19 DIAGNOSIS — K51.30 ULCERATIVE RECTOSIGMOIDITIS WITHOUT COMPLICATION: Primary | ICD-10-CM

## 2023-06-19 DIAGNOSIS — K21.9 GASTRO-ESOPHAGEAL REFLUX DISEASE WITHOUT ESOPHAGITIS: ICD-10-CM

## 2023-06-19 DIAGNOSIS — Z80.0 FAMILY HISTORY OF ESOPHAGEAL CANCER: ICD-10-CM

## 2023-06-19 DIAGNOSIS — K22.70 BARRETT'S ESOPHAGUS WITHOUT DYSPLASIA: ICD-10-CM

## 2023-06-19 DIAGNOSIS — K58.2 IRRITABLE BOWEL SYNDROME WITH BOTH CONSTIPATION AND DIARRHEA: ICD-10-CM

## 2023-06-19 PROCEDURE — 99214 OFFICE O/P EST MOD 30 MIN: CPT | Performed by: PHYSICIAN ASSISTANT

## 2023-06-19 PROCEDURE — 3075F SYST BP GE 130 - 139MM HG: CPT | Performed by: PHYSICIAN ASSISTANT

## 2023-06-19 PROCEDURE — 1160F RVW MEDS BY RX/DR IN RCRD: CPT | Performed by: PHYSICIAN ASSISTANT

## 2023-06-19 PROCEDURE — 3078F DIAST BP <80 MM HG: CPT | Performed by: PHYSICIAN ASSISTANT

## 2023-06-19 PROCEDURE — 1159F MED LIST DOCD IN RCRD: CPT | Performed by: PHYSICIAN ASSISTANT

## 2023-06-19 RX ORDER — MESALAMINE 250 MG/1
1000 CAPSULE ORAL 2 TIMES DAILY
Qty: 720 CAPSULE | Refills: 3 | Status: SHIPPED | OUTPATIENT
Start: 2023-06-19

## 2023-06-19 RX ORDER — ESOMEPRAZOLE MAGNESIUM 40 MG/1
40 CAPSULE, DELAYED RELEASE ORAL DAILY
Qty: 90 CAPSULE | Refills: 3 | Status: SHIPPED | OUTPATIENT
Start: 2023-06-19

## 2023-06-19 NOTE — PROGRESS NOTES
"Chief Complaint  Ulcerative rectosigmoiditis without complication    Subjective          History of Present Illness    Rakel Velarde is a  62 y.o. female presents for ulcerative rectosigmoiditis diagnosed in 2000 and Hernandez's esophagus.  She is a patient Dr. Carballo for bidirectional endoscopy on 8/19/2022.  She is new to me.    She takes Pentasa 250 mg 4 capsules twice daily for ulcerative colitis.  She also takes hyoscyamine as needed for diarrhea episodes. She is doing well, has normal BMs.  Denies hematochezia, abnormal weight loss, abdominal pain.    She takes Nexium 40 mg OTC in the morning for history of Hernandez's esophagus and gastritis. She has occasional flares but overall doing well. Had more breakthrough with 20mg daily so went up to 40mg (takes 2 capsules every morning).     3/15/2023 CMP showed normal LFTs and creatinine.    8/19/2022 EGD showed irregular Z-line, 3 cm hiatal hernia, mild gastritis.  Pathology showed mild gastropathy, negative H. pylori, small area of Hernandez's esophagus without dysplasia.  Recall 3 years.  Colonoscopy same time showed diverticulosis, scattered mild proctitis, otherwise unremarkable mucosa, with normal random colon biopsies.  No evidence of active disease.  Recall 3 years.    She underwent appendectomy with Dr. Duggan on 3/16/2023.  Her umbilical incision opened following surgery but this is now healed.    She has a family history of esophageal cancer in her brother.  No family history of colon cancer.  She is a nonsmoker.     Objective   Vital Signs:   /77   Pulse 65   Temp 96.4 °F (35.8 °C)   Ht 165.1 cm (65\")   Wt 88.4 kg (194 lb 12.8 oz)   SpO2 97%   BMI 32.42 kg/m²       Physical Exam  Vitals reviewed.   Constitutional:       General: She is awake. She is not in acute distress.     Appearance: Normal appearance. She is well-developed and well-groomed.   HENT:      Head: Normocephalic.   Pulmonary:      Effort: Pulmonary effort is normal. No respiratory " distress.   Abdominal:      General: Abdomen is protuberant. Bowel sounds are normal. There is no distension.      Palpations: There is no hepatomegaly, splenomegaly or mass.      Tenderness: There is no abdominal tenderness.   Skin:     Coloration: Skin is not pale.   Neurological:      Mental Status: She is alert and oriented to person, place, and time.      Gait: Gait is intact.   Psychiatric:         Mood and Affect: Mood and affect normal.         Speech: Speech normal.         Behavior: Behavior is cooperative.         Judgment: Judgment normal.        Result Review :             Assessment and Plan    Diagnoses and all orders for this visit:    1. Ulcerative rectosigmoiditis without complication (Primary)    2. Gastro-esophageal reflux disease without esophagitis    3. Irritable bowel syndrome with both constipation and diarrhea    4. Family history of esophageal cancer    5. Hernandez's esophagus without dysplasia    Other orders  -     mesalamine (Pentasa) 250 MG CR capsule; Take 4 capsules by mouth 2 (Two) Times a Day.  Dispense: 720 capsule; Refill: 3  -     esomeprazole (nexIUM) 40 MG capsule; Take 1 capsule by mouth Daily.  Dispense: 90 capsule; Refill: 3    Continue Pentasa and nexium. Recall EGD and CLS in 2025.     She sees her PCP later this week and will have labs done with her.  She had leukocytosis and mild anemia at hospital labs.  Normal LFTs and kidney functions.    Follow Up   Return in about 1 year (around 6/19/2024).    Dragon dictation used throughout this note.     Gale Crawford PA-C

## 2023-07-27 ENCOUNTER — TELEPHONE (OUTPATIENT)
Dept: SLEEP MEDICINE | Facility: HOSPITAL | Age: 63
End: 2023-07-27
Payer: MEDICAID

## 2023-07-28 DIAGNOSIS — G47.33 OBSTRUCTIVE SLEEP APNEA: Primary | ICD-10-CM

## 2023-10-11 ENCOUNTER — HOSPITAL ENCOUNTER (OUTPATIENT)
Dept: MAMMOGRAPHY | Facility: HOSPITAL | Age: 63
Discharge: HOME | End: 2023-10-11
Admitting: PHYSICIAN ASSISTANT
Payer: MEDICAID

## 2023-10-11 PROCEDURE — 77063 BREAST TOMOSYNTHESIS BI: CPT

## 2023-10-11 PROCEDURE — 77067 SCR MAMMO BI INCL CAD: CPT

## 2023-10-12 DIAGNOSIS — R92.8 ABNORMAL MAMMOGRAM: Primary | ICD-10-CM

## 2023-11-15 ENCOUNTER — HOSPITAL ENCOUNTER (OUTPATIENT)
Dept: ULTRASOUND IMAGING | Facility: HOSPITAL | Age: 63
Discharge: HOME | End: 2023-11-15
Payer: MEDICAID

## 2023-11-15 ENCOUNTER — HOSPITAL ENCOUNTER (OUTPATIENT)
Dept: MAMMOGRAPHY | Facility: HOSPITAL | Age: 63
Discharge: HOME | End: 2023-11-15
Admitting: PHYSICIAN ASSISTANT
Payer: MEDICAID

## 2023-11-15 PROCEDURE — G0279 TOMOSYNTHESIS, MAMMO: HCPCS

## 2023-11-15 PROCEDURE — 77066 DX MAMMO INCL CAD BI: CPT

## 2023-11-15 PROCEDURE — 76642 ULTRASOUND BREAST LIMITED: CPT

## 2023-11-27 ENCOUNTER — TELEPHONE (OUTPATIENT)
Dept: GASTROENTEROLOGY | Facility: CLINIC | Age: 63
End: 2023-11-27
Payer: MEDICAID

## 2023-11-27 ENCOUNTER — OFFICE VISIT (OUTPATIENT)
Dept: GASTROENTEROLOGY | Facility: CLINIC | Age: 63
End: 2023-11-27
Payer: MEDICAID

## 2023-11-27 VITALS
DIASTOLIC BLOOD PRESSURE: 75 MMHG | SYSTOLIC BLOOD PRESSURE: 125 MMHG | HEIGHT: 65 IN | BODY MASS INDEX: 32.09 KG/M2 | WEIGHT: 192.6 LBS | TEMPERATURE: 98.2 F | HEART RATE: 74 BPM

## 2023-11-27 DIAGNOSIS — Z80.0 FAMILY HISTORY OF ESOPHAGEAL CANCER: ICD-10-CM

## 2023-11-27 DIAGNOSIS — K22.70 BARRETT'S ESOPHAGUS WITHOUT DYSPLASIA: ICD-10-CM

## 2023-11-27 DIAGNOSIS — A09 DIARRHEA OF INFECTIOUS ORIGIN: Primary | ICD-10-CM

## 2023-11-27 DIAGNOSIS — K51.30 ULCERATIVE RECTOSIGMOIDITIS WITHOUT COMPLICATION: ICD-10-CM

## 2023-11-27 DIAGNOSIS — K58.2 IRRITABLE BOWEL SYNDROME WITH BOTH CONSTIPATION AND DIARRHEA: ICD-10-CM

## 2023-11-27 PROCEDURE — 3078F DIAST BP <80 MM HG: CPT | Performed by: PHYSICIAN ASSISTANT

## 2023-11-27 PROCEDURE — 1159F MED LIST DOCD IN RCRD: CPT | Performed by: PHYSICIAN ASSISTANT

## 2023-11-27 PROCEDURE — 1160F RVW MEDS BY RX/DR IN RCRD: CPT | Performed by: PHYSICIAN ASSISTANT

## 2023-11-27 PROCEDURE — 99213 OFFICE O/P EST LOW 20 MIN: CPT | Performed by: PHYSICIAN ASSISTANT

## 2023-11-27 PROCEDURE — 3074F SYST BP LT 130 MM HG: CPT | Performed by: PHYSICIAN ASSISTANT

## 2023-11-27 NOTE — PROGRESS NOTES
"Chief Complaint  Ulcerative Colitis    Subjective          History of Present Illness    Raekl Velarde is a  63 y.o. female presents for follow-up on ulcerative rectosigmoiditis diagnosed in 2000 and Hernandez's esophagus.  She is a patient Dr. Carballo last seen by me on 6/19/2023.    She takes Pentasa 250 mg 4 capsules twice daily for ulcerative colitis.  She also takes hyoscyamine as needed for diarrhea. She reports worsening bowel habits--loose, watery, urgent, oily stools for a couple months. 6-8 Bms/day. Minimal abdominal pain--\"discomfort.\" She denies hematochezia, weight loss, nocturnal stools.  Under additional stress due to daughter's health.    She takes esomeprazole 40 mg in the morning for history of Hernandez's esophagus and gastritis.  Failed esomeprazole 20 mg.  This works well for her.    From 6/19/2023 note:  8/19/2022 EGD showed irregular Z-line, 3 cm hiatal hernia, mild gastritis.  Pathology showed mild gastropathy, negative H. pylori, small area of Hernandez's esophagus without dysplasia.  Recall 3 years.  Colonoscopy same time showed diverticulosis, scattered mild proctitis, otherwise unremarkable mucosa, with normal random colon biopsies.  No evidence of active disease.  Recall 3 years.     She has a family history of esophageal cancer in her brother.  No family history of colon cancer.  She is a nonsmoker. Daughter recently diagnosed with breast cancer.     Objective   Vital Signs:   /75 (BP Location: Left arm, Patient Position: Sitting, Cuff Size: Large Adult)   Pulse 74   Temp 98.2 °F (36.8 °C) (Temporal)   Ht 165.1 cm (65\")   Wt 87.4 kg (192 lb 9.6 oz)   BMI 32.05 kg/m²       Physical Exam  Vitals reviewed.   Constitutional:       General: She is awake. She is not in acute distress.     Appearance: Normal appearance. She is well-developed and well-groomed.   HENT:      Head: Normocephalic.   Pulmonary:      Effort: Pulmonary effort is normal. No respiratory distress.   Skin:     " Coloration: Skin is not pale.   Neurological:      Mental Status: She is alert and oriented to person, place, and time.      Gait: Gait is intact.   Psychiatric:         Mood and Affect: Mood and affect normal.         Speech: Speech normal.         Behavior: Behavior is cooperative.         Judgment: Judgment normal.          Result Review :             Assessment and Plan    Diagnoses and all orders for this visit:    1. Diarrhea of infectious origin (Primary)  -     Gastrointestinal Panel, PCR - Stool, Per Rectum  -     Clostridioides difficile EIA - Stool, Per Rectum  -     Calprotectin, Fecal - Stool, Per Rectum  -     CBC & Differential  -     Comprehensive Metabolic Panel  -     C-reactive Protein  -     Sedimentation Rate    2. Ulcerative rectosigmoiditis without complication  -     Gastrointestinal Panel, PCR - Stool, Per Rectum  -     Clostridioides difficile EIA - Stool, Per Rectum  -     Calprotectin, Fecal - Stool, Per Rectum  -     CBC & Differential  -     Comprehensive Metabolic Panel  -     C-reactive Protein  -     Sedimentation Rate    3. Irritable bowel syndrome with both constipation and diarrhea    4. Family history of esophageal cancer    5. Hernandez's esophagus without dysplasia    Subacute diarrhea, possibly UC flare versus IBS with additional stressors currently.  Will check stool studies above for infection and inflammation as well as blood work.    Continue Pentasa at current dosing for now.  May need to consider rectal preparation.  Could consider switch to Lialda.  May need CT scan or sooner colonoscopy depending on results of above and response to treatment.    Continue esomeprazole.  Recall EGD and colonoscopy in 2025.    Follow Up   Return in about 6 weeks (around 1/8/2024).    Dragon dictation used throughout this note.     Gale Crawford PA-C

## 2023-11-27 NOTE — TELEPHONE ENCOUNTER
Patient has an appt today with Gale Crawford at 2:15. Called patient to see if she wanted to come in at 1:15 or 1:30 whichever is available. No answer, left VM.

## 2023-11-28 LAB
ALBUMIN SERPL-MCNC: 4.2 G/DL (ref 3.9–4.9)
ALBUMIN/GLOB SERPL: 1.3 {RATIO} (ref 1.2–2.2)
ALP SERPL-CCNC: 86 IU/L (ref 44–121)
ALT SERPL-CCNC: 17 IU/L (ref 0–32)
AST SERPL-CCNC: 20 IU/L (ref 0–40)
BASOPHILS # BLD AUTO: 0.1 X10E3/UL (ref 0–0.2)
BASOPHILS NFR BLD AUTO: 1 %
BILIRUB SERPL-MCNC: 0.3 MG/DL (ref 0–1.2)
BUN SERPL-MCNC: 10 MG/DL (ref 8–27)
BUN/CREAT SERPL: 14 (ref 12–28)
CALCIUM SERPL-MCNC: 9.7 MG/DL (ref 8.7–10.3)
CHLORIDE SERPL-SCNC: 96 MMOL/L (ref 96–106)
CO2 SERPL-SCNC: 22 MMOL/L (ref 20–29)
CREAT SERPL-MCNC: 0.73 MG/DL (ref 0.57–1)
CRP SERPL-MCNC: 2 MG/L (ref 0–10)
EGFRCR SERPLBLD CKD-EPI 2021: 92 ML/MIN/1.73
EOSINOPHIL # BLD AUTO: 0.2 X10E3/UL (ref 0–0.4)
EOSINOPHIL NFR BLD AUTO: 2 %
ERYTHROCYTE [DISTWIDTH] IN BLOOD BY AUTOMATED COUNT: 13.2 % (ref 11.7–15.4)
ERYTHROCYTE [SEDIMENTATION RATE] IN BLOOD BY WESTERGREN METHOD: 30 MM/HR (ref 0–40)
GLOBULIN SER CALC-MCNC: 3.3 G/DL (ref 1.5–4.5)
GLUCOSE SERPL-MCNC: NORMAL MG/DL
HCT VFR BLD AUTO: 38.8 % (ref 34–46.6)
HGB BLD-MCNC: 13.7 G/DL (ref 11.1–15.9)
IMM GRANULOCYTES # BLD AUTO: 0 X10E3/UL (ref 0–0.1)
IMM GRANULOCYTES NFR BLD AUTO: 0 %
LYMPHOCYTES # BLD AUTO: 3.1 X10E3/UL (ref 0.7–3.1)
LYMPHOCYTES NFR BLD AUTO: 32 %
MCH RBC QN AUTO: 32.2 PG (ref 26.6–33)
MCHC RBC AUTO-ENTMCNC: 35.3 G/DL (ref 31.5–35.7)
MCV RBC AUTO: 91 FL (ref 79–97)
MONOCYTES # BLD AUTO: 0.6 X10E3/UL (ref 0.1–0.9)
MONOCYTES NFR BLD AUTO: 6 %
NEUTROPHILS # BLD AUTO: 5.7 X10E3/UL (ref 1.4–7)
NEUTROPHILS NFR BLD AUTO: 59 %
PLATELET # BLD AUTO: 325 X10E3/UL (ref 150–450)
POTASSIUM SERPL-SCNC: NORMAL MMOL/L
PROT SERPL-MCNC: 7.5 G/DL (ref 6–8.5)
RBC # BLD AUTO: 4.25 X10E6/UL (ref 3.77–5.28)
SODIUM SERPL-SCNC: 136 MMOL/L (ref 134–144)
WBC # BLD AUTO: 9.7 X10E3/UL (ref 3.4–10.8)

## 2023-12-03 LAB — CALPROTECTIN STL-MCNT: 73 UG/G (ref 0–120)

## 2023-12-07 RX ORDER — MESALAMINE 1000 MG/1
1000 SUPPOSITORY RECTAL NIGHTLY
Qty: 42 SUPPOSITORY | Refills: 0 | Status: SHIPPED | OUTPATIENT
Start: 2023-12-07 | End: 2024-01-18

## 2023-12-20 ENCOUNTER — TELEPHONE (OUTPATIENT)
Dept: FAMILY MEDICINE CLINIC | Facility: CLINIC | Age: 63
End: 2023-12-20
Payer: MEDICAID

## 2023-12-20 NOTE — TELEPHONE ENCOUNTER
"Caller: Rakel Velarde \"Shannan\"    Relationship to patient: Self    Best call back number: [phone]    Patient is needing: PATIENT WAS NEEDING TO CANCEL LAB APPOINTMENT FOR TODAY 12/20/23 AS SHE TESTED POSITIVE FOR COVID. [facility] WAS UNABLE TO WARM TRANSFER. PATIENT REQUESTING CALLBACK TO GET RESCHEDULED.      "

## 2023-12-27 ENCOUNTER — TELEPHONE (OUTPATIENT)
Dept: GASTROENTEROLOGY | Facility: CLINIC | Age: 63
End: 2023-12-27
Payer: MEDICAID

## 2023-12-27 ENCOUNTER — OFFICE VISIT (OUTPATIENT)
Dept: FAMILY MEDICINE CLINIC | Facility: CLINIC | Age: 63
End: 2023-12-27
Payer: MEDICAID

## 2023-12-27 VITALS
BODY MASS INDEX: 30.99 KG/M2 | SYSTOLIC BLOOD PRESSURE: 118 MMHG | WEIGHT: 186 LBS | TEMPERATURE: 97.8 F | HEIGHT: 65 IN | OXYGEN SATURATION: 98 % | HEART RATE: 76 BPM | RESPIRATION RATE: 16 BRPM | DIASTOLIC BLOOD PRESSURE: 70 MMHG

## 2023-12-27 DIAGNOSIS — E78.2 MIXED HYPERLIPIDEMIA: Chronic | ICD-10-CM

## 2023-12-27 DIAGNOSIS — Z78.0 POST-MENOPAUSAL: ICD-10-CM

## 2023-12-27 DIAGNOSIS — E55.9 VITAMIN D DEFICIENCY: Chronic | ICD-10-CM

## 2023-12-27 DIAGNOSIS — R73.01 IMPAIRED FASTING GLUCOSE: ICD-10-CM

## 2023-12-27 DIAGNOSIS — I10 PRIMARY HYPERTENSION: Chronic | ICD-10-CM

## 2023-12-27 DIAGNOSIS — Z12.31 ENCOUNTER FOR SCREENING MAMMOGRAM FOR BREAST CANCER: ICD-10-CM

## 2023-12-27 DIAGNOSIS — I10 ESSENTIAL HYPERTENSION: ICD-10-CM

## 2023-12-27 DIAGNOSIS — J06.9 ACUTE URI: Primary | ICD-10-CM

## 2023-12-27 DIAGNOSIS — F41.9 ANXIETY: ICD-10-CM

## 2023-12-27 RX ORDER — VALSARTAN 80 MG/1
80 TABLET ORAL DAILY
Qty: 90 TABLET | Refills: 1 | Status: SHIPPED | OUTPATIENT
Start: 2023-12-27

## 2023-12-27 RX ORDER — HYDROCHLOROTHIAZIDE 50 MG/1
50 TABLET ORAL DAILY
Qty: 90 TABLET | Refills: 1 | Status: SHIPPED | OUTPATIENT
Start: 2023-12-27

## 2023-12-27 RX ORDER — DEXTROMETHORPHAN HYDROBROMIDE AND PROMETHAZINE HYDROCHLORIDE 15; 6.25 MG/5ML; MG/5ML
5 SYRUP ORAL 4 TIMES DAILY PRN
Qty: 180 ML | Refills: 1 | Status: SHIPPED | OUTPATIENT
Start: 2023-12-27

## 2023-12-27 RX ORDER — AZITHROMYCIN 250 MG/1
TABLET, FILM COATED ORAL
Qty: 6 TABLET | Refills: 1 | Status: SHIPPED | OUTPATIENT
Start: 2023-12-27

## 2023-12-27 NOTE — TELEPHONE ENCOUNTER
That should have been run with her other stool studies.  Unfortunately it was not.  Can someone call her and see if she still having significant diarrhea as I started her on mesalamine rectal suppositories to see if this would help her inflammation.  If she is doing better, then no reason to proceed with C. difficile testing.  If not doing better, ask her to come in to  another stool kit.

## 2023-12-27 NOTE — PROGRESS NOTES
"Subjective   Rakel Velarde is a 63 y.o. female.     Hyperlipidemia  Pertinent negatives include no shortness of breath.   Hypertension  Associated symptoms include anxiety. Pertinent negatives include no blurred vision or shortness of breath.   Heartburn  She complains of coughing. She reports no choking. Associated symptoms include fatigue.   Anxiety  Patient reports no shortness of breath or suicidal ideas.     Her past medical history is significant for depression.   DepressionPatient is not experiencing: choking sensation, shortness of breath and suicidal ideas.          Since the last visit, she has overall felt well until recent illness.  She has Primary Hypertension and well controlled on current medication, Impaired fasting glucose and will monitor labs to watch for DMII, GERD controlled on PPI Rx, Hyperlipidemia and working on this with diet and exercise, and Vitamin D deficiency and will update labs for continued management.  she has been compliant with current medications have reviewed them.  The patient denies medication side effects.  Will refill medications. /70   Pulse 76   Temp 97.8 °F (36.6 °C)   Resp 16   Ht 165.1 cm (65\")   Wt 84.4 kg (186 lb)   LMP  (LMP Unknown)   SpO2 98%   BMI 30.95 kg/m²   COVID ----started on 12-16-23---still congested---all clear; cough  Results for orders placed or performed in visit on 11/28/23   Gastrointestinal Panel, PCR - ,   Result Value Ref Range    Campylobacter Not Detected Not Detected    C.difficile toxin A/B Not Detected Not Detected    Plesiomonas shigelloides Not Detected Not Detected    Salmonella Not Detected Not Detected    Vibrio Not Detected Not Detected    Vibrio cholerae Not Detected Not Detected    Yersinia enterocolitica Not Detected Not Detected    Enteroaggregative E. coli Not Detected Not Detected    Enteropathogenic E. coli Not Detected Not Detected    Enterotoxigenic E. coli Not Detected Not Detected    Shiga-like toxin-producing " E. coli  Not Detected Not Detected    E. coli O157 Not applicable Not Detected    Shigella enteroinvasive E. coli Not Detected Not Detected    Cryptosporidium Not Detected Not Detected    Cyclospora cayetanensis Not Detected Not Detected    Entamoeba Histolytica Ag Not Detected Not Detected    Giardia lamblia Not Detected Not Detected    Adenovirus 40/41 Ag Not Detected Not Detected    Astrovirus Not Detected Not Detected    Norovirus GI/GII Not Detected Not Detected    Rotavirus A Not Detected Not Detected    Sapovirus Not Detected Not Detected   Calprotectin, Fecal - ,   Result Value Ref Range    Calprotectin, Fecal 73 0 - 120 ug/g   The 10-year ASCVD risk score (Cesilia WYATT, et al., 2019) is: 6.1%    Values used to calculate the score:      Age: 63 years      Sex: Female      Is Non- : No      Diabetic: No      Tobacco smoker: No      Systolic Blood Pressure: 118 mmHg      Is BP treated: Yes      HDL Cholesterol: 53 mg/dL      Total Cholesterol: 214 mg/dL   Cutivate cream worked well for intrinsic eczema.  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  Continue generic Cutivate for the eczema on leg works well  Continue generic Valtrex for herpes simplex suppression working well  Has history of perioral dermatitis the Mycolog works fantastic   Rakel THOMAS Velarde 63 y.o. female who presents for evaluation of upper respiratory congestion. Symptoms include congestion, post nasal drip, productive cough, chills, and fatigue.  Onset of symptoms was 10 days ago, gradually improving since that time. Patient denies shortness of breath, wheezing, fever.   Evaluation to date: none Treatment to date:  Tylenol.     The following portions of  the patient's history were reviewed and updated as appropriate: allergies, current medications, past family history, past medical history, past social history, past surgical history, and problem list.    Review of Systems   Constitutional:  Positive for fatigue. Negative for diaphoresis and fever.   HENT:  Positive for congestion and postnasal drip. Negative for nosebleeds and trouble swallowing.    Eyes:  Negative for blurred vision and visual disturbance.   Respiratory:  Positive for cough. Negative for choking and shortness of breath.    Gastrointestinal:  Negative for blood in stool.   Allergic/Immunologic: Negative for immunocompromised state.   Neurological:  Negative for facial asymmetry and speech difficulty.   Psychiatric/Behavioral:  Negative for self-injury and suicidal ideas.        Objective   Physical Exam  Vitals and nursing note reviewed.   Constitutional:       General: She is not in acute distress.     Appearance: She is well-developed. She is not toxic-appearing or diaphoretic.   HENT:      Head: Normocephalic and atraumatic. Hair is normal.      Right Ear: External ear normal. No drainage, swelling or tenderness. Tympanic membrane is retracted.      Left Ear: External ear normal. No drainage, swelling or tenderness. Tympanic membrane is retracted.      Ears:      Comments: Ear fluid       Nose: Mucosal edema present.      Mouth/Throat:      Mouth: No oral lesions.      Pharynx: Uvula midline. Posterior oropharyngeal erythema present. No oropharyngeal exudate or uvula swelling.   Eyes:      General: No scleral icterus.        Right eye: No discharge.         Left eye: No discharge.      Conjunctiva/sclera: Conjunctivae normal.      Pupils: Pupils are equal, round, and reactive to light.   Cardiovascular:      Rate and Rhythm: Normal rate and regular rhythm.      Heart sounds: Normal heart sounds. No murmur heard.     No gallop.   Pulmonary:      Effort: No respiratory distress.      Breath sounds:  Normal breath sounds. No stridor. No wheezing or rales.   Chest:      Chest wall: No tenderness.   Abdominal:      Palpations: Abdomen is soft.      Tenderness: There is no abdominal tenderness.   Musculoskeletal:      Cervical back: Normal range of motion and neck supple.      Right lower leg: Edema present.      Left lower leg: Edema present.      Comments: Trace pedal edema = bilat     Lymphadenopathy:      Cervical: Cervical adenopathy present.   Skin:     General: Skin is warm and dry.      Findings: No rash.   Neurological:      Mental Status: She is alert and oriented to person, place, and time.      Motor: No abnormal muscle tone.   Psychiatric:         Mood and Affect: Mood normal.         Behavior: Behavior normal.         Thought Content: Thought content normal.         Judgment: Judgment normal.           Assessment & Plan   Diagnoses and all orders for this visit:    1. Encounter for screening mammogram for breast cancer (Primary)  -     Mammo Screening Digital Tomosynthesis Bilateral With CAD; Future    2. Post-menopausal  -     DEXA Bone Density Axial; Future    3. Acute URI    Other orders  -     promethazine-dextromethorphan (PROMETHAZINE-DM) 6.25-15 MG/5ML syrup; Take 5 mL by mouth 4 (Four) Times a Day As Needed for Cough.  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  Continue generic Cutivate for the eczema on leg works well  Continue generic Valtrex for herpes simplex suppression working well  Has  history of perioral dermatitis the Mycolog works fantastic   Plan, Rakel Velarde, was seen today.  she was seen for HTN and continue medication, Imparied fasting glucose and plan follow up labs, diet, and exercise, GERD and will continue on PPI medication, Hyperlipidemia and will work on this with diet and exercise, and Vitamin D deficiency and will update labs .  Still need labs!!  Z-Karthikeyan for upper respiratory infection secondary to COVID and Promethazine DM as needed for cough watch drowsiness

## 2023-12-28 NOTE — TELEPHONE ENCOUNTER
Called pt and passed on Gale's recommendations and the pt verbalized understanding.  She is no longer having diarrhea.

## 2024-01-02 RX ORDER — MESALAMINE 1000 MG/1
1000 SUPPOSITORY RECTAL NIGHTLY
Qty: 30 SUPPOSITORY | Refills: 1 | Status: SHIPPED | OUTPATIENT
Start: 2024-01-02 | End: 2024-02-13

## 2024-01-28 RX ORDER — CITALOPRAM 20 MG/1
20 TABLET ORAL DAILY
Qty: 90 TABLET | Refills: 3 | Status: SHIPPED | OUTPATIENT
Start: 2024-01-28

## 2024-01-29 ENCOUNTER — TELEPHONE (OUTPATIENT)
Dept: GASTROENTEROLOGY | Facility: CLINIC | Age: 64
End: 2024-01-29
Payer: MEDICAID

## 2024-01-29 ENCOUNTER — OFFICE VISIT (OUTPATIENT)
Dept: GASTROENTEROLOGY | Facility: CLINIC | Age: 64
End: 2024-01-29
Payer: MEDICAID

## 2024-01-29 VITALS
SYSTOLIC BLOOD PRESSURE: 120 MMHG | OXYGEN SATURATION: 97 % | BODY MASS INDEX: 30.05 KG/M2 | WEIGHT: 187 LBS | HEART RATE: 60 BPM | HEIGHT: 66 IN | DIASTOLIC BLOOD PRESSURE: 75 MMHG | TEMPERATURE: 96.8 F

## 2024-01-29 DIAGNOSIS — K58.2 IRRITABLE BOWEL SYNDROME WITH BOTH CONSTIPATION AND DIARRHEA: ICD-10-CM

## 2024-01-29 DIAGNOSIS — K51.30 ULCERATIVE RECTOSIGMOIDITIS WITHOUT COMPLICATION: Primary | ICD-10-CM

## 2024-01-29 DIAGNOSIS — K22.70 BARRETT'S ESOPHAGUS WITHOUT DYSPLASIA: ICD-10-CM

## 2024-01-29 PROCEDURE — 3078F DIAST BP <80 MM HG: CPT | Performed by: PHYSICIAN ASSISTANT

## 2024-01-29 PROCEDURE — 1160F RVW MEDS BY RX/DR IN RCRD: CPT | Performed by: PHYSICIAN ASSISTANT

## 2024-01-29 PROCEDURE — 3074F SYST BP LT 130 MM HG: CPT | Performed by: PHYSICIAN ASSISTANT

## 2024-01-29 PROCEDURE — 1159F MED LIST DOCD IN RCRD: CPT | Performed by: PHYSICIAN ASSISTANT

## 2024-01-29 PROCEDURE — 99214 OFFICE O/P EST MOD 30 MIN: CPT | Performed by: PHYSICIAN ASSISTANT

## 2024-01-29 NOTE — PROGRESS NOTES
"Chief Complaint  Ulcerative rectosigmoiditis and Diarrhea (Loose stool some days)    Subjective          History of Present Illness    Rakel Velarde is a  63 y.o. female presents for follow-up on ulcerative rectosigmoiditis diagnosed in 2000 and Hernandez's esophagus.  She is a patient Dr. Carballo last seen by me on 11/27/2023.    She takes Pentasa 250 mg 4 capsules twice daily for ulcerative colitis.  She also takes hyoscyamine as needed for diarrhea.  She was started on mesalamine rectal suppositories after last visit to see if this would help symptoms.  Today she reports overall improved. Still with oil stools, diarrhea about once a week, lasts all day. Taking levsin for diarrhea which helps some. Denies blood in stool, abdominal pain, nausea, vomiting.     11/28/2023 stool studies showed normal GI panel, fecal calprotectin borderline at 73.  11/27/2023 labs showed normal sed rate, CRP, CBC, and CMP.      From 11/27/2023 office note:  She takes esomeprazole 40 mg in the morning for history of Hernandez's esophagus and gastritis.  Failed esomeprazole 20 mg.  This works well for her.     8/19/2022 EGD showed irregular Z-line, 3 cm hiatal hernia, mild gastritis.  Pathology showed mild gastropathy, negative H. pylori, small area of Hernandez's esophagus without dysplasia.  Recall 3 years.  Colonoscopy same time showed diverticulosis, scattered mild proctitis, otherwise unremarkable mucosa, with normal random colon biopsies.  No evidence of active disease.  Recall 3 years.     She has a family history of esophageal cancer in her brother.  No family history of colon cancer.  She is a nonsmoker. Daughter recently diagnosed with breast cancer.     Objective   Vital Signs:   /75   Pulse 60   Temp 96.8 °F (36 °C) (Temporal)   Ht 167.6 cm (66\")   Wt 84.8 kg (187 lb)   SpO2 97%   BMI 30.18 kg/m²       Physical Exam  Vitals reviewed.   Constitutional:       General: She is awake. She is not in acute distress.     " Appearance: Normal appearance. She is well-developed and well-groomed.   HENT:      Head: Normocephalic.   Pulmonary:      Effort: Pulmonary effort is normal. No respiratory distress.   Skin:     Coloration: Skin is not pale.   Neurological:      Mental Status: She is alert and oriented to person, place, and time.      Gait: Gait is intact.   Psychiatric:         Mood and Affect: Mood and affect normal.         Speech: Speech normal.         Behavior: Behavior is cooperative.         Judgment: Judgment normal.          Result Review :             Assessment and Plan    Diagnoses and all orders for this visit:    1. Ulcerative rectosigmoiditis without complication (Primary)  -     Case Request; Standing  -     Implement Anesthesia orders day of procedure.; Standing  -     Verify bowel prep was successful; Standing  -     Give tap water enema if bowel prep was insufficient; Standing  -     Obtain Informed Consent; Standing  -     Case Request    2. Irritable bowel syndrome with both constipation and diarrhea    3. Hernandez's esophagus without dysplasia    IBS vs mild UC although fecal calprotectin borderline, likely IBS. Trial imodium as needed for diarrhea. OK to continue levsin as needed for abdominal cramping.    Will plan for colonoscopy now to evaluate extent of inflammation, if any. May need to adjust mesalamine based on findings or more aggressively treat IBS.     Continue current dose of pentasa for now.     Continue esomeprazole for Hernandez's esophagus/GERD.     Follow Up   Return for Colonoscopy.    Dragon dictation used throughout this note.     Gale Crawford PA-C

## 2024-02-05 RX ORDER — FLUTICASONE PROPIONATE 0.05 %
CREAM (GRAM) TOPICAL 2 TIMES DAILY
Qty: 60 G | Refills: 5 | Status: SHIPPED | OUTPATIENT
Start: 2024-02-05

## 2024-05-06 ENCOUNTER — HOSPITAL ENCOUNTER (OUTPATIENT)
Facility: HOSPITAL | Age: 64
Setting detail: HOSPITAL OUTPATIENT SURGERY
Discharge: HOME | End: 2024-05-06
Attending: INTERNAL MEDICINE | Admitting: INTERNAL MEDICINE
Payer: MEDICAID

## 2024-05-06 ENCOUNTER — ANESTHESIA (OUTPATIENT)
Dept: GASTROENTEROLOGY | Facility: HOSPITAL | Age: 64
End: 2024-05-06
Payer: MEDICAID

## 2024-05-06 ENCOUNTER — ANESTHESIA EVENT (OUTPATIENT)
Dept: GASTROENTEROLOGY | Facility: HOSPITAL | Age: 64
End: 2024-05-06
Payer: MEDICAID

## 2024-05-06 VITALS
WEIGHT: 193.4 LBS | RESPIRATION RATE: 16 BRPM | HEART RATE: 57 BPM | DIASTOLIC BLOOD PRESSURE: 68 MMHG | HEIGHT: 65 IN | OXYGEN SATURATION: 100 % | BODY MASS INDEX: 32.22 KG/M2 | SYSTOLIC BLOOD PRESSURE: 112 MMHG

## 2024-05-06 DIAGNOSIS — K51.30 ULCERATIVE RECTOSIGMOIDITIS WITHOUT COMPLICATION: ICD-10-CM

## 2024-05-06 PROCEDURE — 25810000003 LACTATED RINGERS PER 1000 ML

## 2024-05-06 PROCEDURE — 25010000002 PROPOFOL 1000 MG/100ML EMULSION

## 2024-05-06 PROCEDURE — 25810000003 LACTATED RINGERS PER 1000 ML: Performed by: INTERNAL MEDICINE

## 2024-05-06 PROCEDURE — 25010000002 PROPOFOL 200 MG/20ML EMULSION

## 2024-05-06 PROCEDURE — 88305 TISSUE EXAM BY PATHOLOGIST: CPT | Performed by: INTERNAL MEDICINE

## 2024-05-06 PROCEDURE — 45380 COLONOSCOPY AND BIOPSY: CPT | Performed by: INTERNAL MEDICINE

## 2024-05-06 RX ORDER — PROPOFOL 10 MG/ML
INJECTION, EMULSION INTRAVENOUS CONTINUOUS PRN
Status: DISCONTINUED | OUTPATIENT
Start: 2024-05-06 | End: 2024-05-06 | Stop reason: SURG

## 2024-05-06 RX ORDER — PROPOFOL 10 MG/ML
INJECTION, EMULSION INTRAVENOUS AS NEEDED
Status: DISCONTINUED | OUTPATIENT
Start: 2024-05-06 | End: 2024-05-06 | Stop reason: SURG

## 2024-05-06 RX ORDER — SODIUM CHLORIDE, SODIUM LACTATE, POTASSIUM CHLORIDE, CALCIUM CHLORIDE 600; 310; 30; 20 MG/100ML; MG/100ML; MG/100ML; MG/100ML
INJECTION, SOLUTION INTRAVENOUS CONTINUOUS PRN
Status: DISCONTINUED | OUTPATIENT
Start: 2024-05-06 | End: 2024-05-06 | Stop reason: SURG

## 2024-05-06 RX ORDER — SODIUM CHLORIDE, SODIUM LACTATE, POTASSIUM CHLORIDE, CALCIUM CHLORIDE 600; 310; 30; 20 MG/100ML; MG/100ML; MG/100ML; MG/100ML
30 INJECTION, SOLUTION INTRAVENOUS CONTINUOUS PRN
Status: DISCONTINUED | OUTPATIENT
Start: 2024-05-06 | End: 2024-05-06 | Stop reason: HOSPADM

## 2024-05-06 RX ORDER — LIDOCAINE HYDROCHLORIDE 20 MG/ML
INJECTION, SOLUTION INFILTRATION; PERINEURAL AS NEEDED
Status: DISCONTINUED | OUTPATIENT
Start: 2024-05-06 | End: 2024-05-06 | Stop reason: SURG

## 2024-05-06 RX ADMIN — LIDOCAINE HYDROCHLORIDE 80 MG: 20 INJECTION, SOLUTION INFILTRATION; PERINEURAL at 09:51

## 2024-05-06 RX ADMIN — SODIUM CHLORIDE, POTASSIUM CHLORIDE, SODIUM LACTATE AND CALCIUM CHLORIDE 30 ML/HR: 600; 310; 30; 20 INJECTION, SOLUTION INTRAVENOUS at 09:42

## 2024-05-06 RX ADMIN — PROPOFOL INJECTABLE EMULSION 40 MG: 10 INJECTION, EMULSION INTRAVENOUS at 10:04

## 2024-05-06 RX ADMIN — SODIUM CHLORIDE, SODIUM LACTATE, POTASSIUM CHLORIDE, AND CALCIUM CHLORIDE: 600; 310; 30; 20 INJECTION, SOLUTION INTRAVENOUS at 09:48

## 2024-05-06 RX ADMIN — PROPOFOL INJECTABLE EMULSION 80 MG: 10 INJECTION, EMULSION INTRAVENOUS at 09:51

## 2024-05-06 RX ADMIN — PROPOFOL 160 MCG/KG/MIN: 10 INJECTION, EMULSION INTRAVENOUS at 09:51

## 2024-05-07 LAB
LAB AP CASE REPORT: NORMAL
PATH REPORT.FINAL DX SPEC: NORMAL
PATH REPORT.GROSS SPEC: NORMAL

## 2024-05-27 RX ORDER — ESTRADIOL 10 UG/1
TABLET VAGINAL
Qty: 24 TABLET | Refills: 3 | Status: SHIPPED | OUTPATIENT
Start: 2024-05-27

## 2024-06-02 RX ORDER — VALACYCLOVIR HYDROCHLORIDE 500 MG/1
500 TABLET, FILM COATED ORAL DAILY
Qty: 90 TABLET | Refills: 3 | Status: SHIPPED | OUTPATIENT
Start: 2024-06-02

## 2024-06-03 ENCOUNTER — TELEPHONE (OUTPATIENT)
Dept: GASTROENTEROLOGY | Facility: CLINIC | Age: 64
End: 2024-06-03
Payer: MEDICAID

## 2024-06-03 DIAGNOSIS — I10 ESSENTIAL HYPERTENSION: ICD-10-CM

## 2024-06-03 RX ORDER — HYDROCHLOROTHIAZIDE 50 MG/1
50 TABLET ORAL DAILY
Qty: 90 TABLET | Refills: 1 | OUTPATIENT
Start: 2024-06-03

## 2024-06-03 RX ORDER — ESTRADIOL 0.5 MG/1
0.5 TABLET ORAL DAILY
Qty: 90 TABLET | Refills: 3 | OUTPATIENT
Start: 2024-06-03

## 2024-06-03 RX ORDER — DEXTROMETHORPHAN HYDROBROMIDE AND PROMETHAZINE HYDROCHLORIDE 15; 6.25 MG/5ML; MG/5ML
SYRUP ORAL
Qty: 180 ML | Refills: 1 | OUTPATIENT
Start: 2024-06-03

## 2024-06-03 NOTE — TELEPHONE ENCOUNTER
Patient called. Advised as per Dr. Jefferson's note. She verb understanding.   Repeat c/s in 2 year added to recall and HM lists 5/6/26.

## 2024-06-03 NOTE — TELEPHONE ENCOUNTER
----- Message from Dontae Leonardo sent at 5/28/2024  1:40 PM EDT -----  Benign colon bx  Repeat 2 years  Office f/u 6mos

## 2024-06-04 ENCOUNTER — TELEPHONE (OUTPATIENT)
Dept: FAMILY MEDICINE CLINIC | Facility: CLINIC | Age: 64
End: 2024-06-04
Payer: MEDICAID

## 2024-06-04 DIAGNOSIS — I10 ESSENTIAL HYPERTENSION: ICD-10-CM

## 2024-06-04 RX ORDER — ESOMEPRAZOLE MAGNESIUM 40 MG/1
40 CAPSULE, DELAYED RELEASE ORAL DAILY
Qty: 90 CAPSULE | Refills: 2 | Status: SHIPPED | OUTPATIENT
Start: 2024-06-04

## 2024-06-04 RX ORDER — HYDROCHLOROTHIAZIDE 50 MG/1
50 TABLET ORAL DAILY
Qty: 90 TABLET | Refills: 1 | OUTPATIENT
Start: 2024-06-04

## 2024-06-04 RX ORDER — ESTRADIOL 0.5 MG/1
0.5 TABLET ORAL DAILY
Qty: 90 TABLET | Refills: 3 | OUTPATIENT
Start: 2024-06-04

## 2024-06-04 NOTE — TELEPHONE ENCOUNTER
"  Caller: Rakel Velarde \"Shannan\"    Relationship: Self    Best call back number: [phone]    What is the best time to reach you: ANYTIME     Who are you requesting to speak with (clinical staff, provider,  specific staff member): RUBINA GALLEGOS     What was the call regarding: PATIENT STATES SHE IS WANTING TO KNOW IF SHE IS LABS PRIOR TO HER APPOINTMENT ON 06/26/2024.    PATIENT IS REQUESTING A CALL BACK TO LET HER KNOW.     "

## 2024-06-04 NOTE — TELEPHONE ENCOUNTER
"Caller: Rakel Velarde \"Shannan\"    Relationship: Self    Best call back number: [phone]    Requested Prescriptions:   Requested Prescriptions     Pending Prescriptions Disp Refills    hydroCHLOROthiazide 50 MG tablet 90 tablet 1     Sig: Take 1 tablet by mouth Daily. for blood pressure    estradiol (ESTRACE) 0.5 MG tablet 90 tablet 3     Sig: Take 1 tablet by mouth Daily. [facility]        Pharmacy where request should be sent: [facility] [address] [address] - [phone]  - [phone] FX     Last office visit with prescribing clinician: 12/27/2023   Last telemedicine visit with prescribing clinician: Visit date not found   Next office visit with prescribing clinician: 6/26/2024     Additional details provided by patient: PATIENT STATES SHE HAS ENOUGH MEDICATION TO LAST HER UNTIL THE END OF THIS WEEK.     PATIENT STATES SHE IS WANTING TO KNOW IF SHE CAN GET A REFILL TO LAST HER AT LEAST UNTIL HER APPOINTMENT ON 06/26/2024.       Would you like a call back once the refill request has been completed: [x] Yes [] No    If the office needs to give you a call back, can they leave a voicemail: [x] Yes [] No    Marguerite Gordillo, PCT   06/04/24 13:10 EDT       "

## 2024-06-05 ENCOUNTER — TELEPHONE (OUTPATIENT)
Dept: FAMILY MEDICINE CLINIC | Facility: CLINIC | Age: 64
End: 2024-06-05
Payer: MEDICAID

## 2024-06-05 NOTE — TELEPHONE ENCOUNTER
Called patient back and LVM to inform her that she had labs done in December and can just come to her appt June 26

## 2024-06-05 NOTE — TELEPHONE ENCOUNTER
Spoke with patient to inform her per her provider Alda Concepcion   Had labs in December wait on ordering labs   to see me for appointment   Patient voiced understanding

## 2024-06-19 RX ORDER — PROGESTERONE 100 MG/1
100 CAPSULE ORAL DAILY
Qty: 90 CAPSULE | Refills: 0 | Status: SHIPPED | OUTPATIENT
Start: 2024-06-19

## 2024-06-19 RX ORDER — ESTRADIOL 0.5 MG/1
0.5 TABLET ORAL DAILY
Qty: 90 TABLET | Refills: 0 | Status: SHIPPED | OUTPATIENT
Start: 2024-06-19

## 2024-06-26 ENCOUNTER — OFFICE VISIT (OUTPATIENT)
Dept: FAMILY MEDICINE CLINIC | Facility: CLINIC | Age: 64
End: 2024-06-26
Payer: MEDICAID

## 2024-06-26 VITALS
SYSTOLIC BLOOD PRESSURE: 120 MMHG | HEART RATE: 80 BPM | OXYGEN SATURATION: 97 % | DIASTOLIC BLOOD PRESSURE: 72 MMHG | BODY MASS INDEX: 32.65 KG/M2 | HEIGHT: 65 IN | RESPIRATION RATE: 16 BRPM | WEIGHT: 196 LBS

## 2024-06-26 DIAGNOSIS — E55.9 VITAMIN D DEFICIENCY: ICD-10-CM

## 2024-06-26 DIAGNOSIS — E78.2 MIXED HYPERLIPIDEMIA: ICD-10-CM

## 2024-06-26 DIAGNOSIS — F41.9 ANXIETY: ICD-10-CM

## 2024-06-26 DIAGNOSIS — I10 ESSENTIAL HYPERTENSION: ICD-10-CM

## 2024-06-26 DIAGNOSIS — K21.9 GASTRO-ESOPHAGEAL REFLUX DISEASE WITHOUT ESOPHAGITIS: ICD-10-CM

## 2024-06-26 DIAGNOSIS — I10 PRIMARY HYPERTENSION: Primary | ICD-10-CM

## 2024-06-26 DIAGNOSIS — K51.30 ULCERATIVE RECTOSIGMOIDITIS WITHOUT COMPLICATION: ICD-10-CM

## 2024-06-26 DIAGNOSIS — L71.0 PERIORAL DERMATITIS: ICD-10-CM

## 2024-06-26 DIAGNOSIS — L20.84 INTRINSIC ECZEMA: ICD-10-CM

## 2024-06-26 PROCEDURE — 3074F SYST BP LT 130 MM HG: CPT | Performed by: PHYSICIAN ASSISTANT

## 2024-06-26 PROCEDURE — 3078F DIAST BP <80 MM HG: CPT | Performed by: PHYSICIAN ASSISTANT

## 2024-06-26 PROCEDURE — 1126F AMNT PAIN NOTED NONE PRSNT: CPT | Performed by: PHYSICIAN ASSISTANT

## 2024-06-26 PROCEDURE — 1160F RVW MEDS BY RX/DR IN RCRD: CPT | Performed by: PHYSICIAN ASSISTANT

## 2024-06-26 PROCEDURE — 1159F MED LIST DOCD IN RCRD: CPT | Performed by: PHYSICIAN ASSISTANT

## 2024-06-26 PROCEDURE — 99214 OFFICE O/P EST MOD 30 MIN: CPT | Performed by: PHYSICIAN ASSISTANT

## 2024-06-26 RX ORDER — NYSTATIN AND TRIAMCINOLONE ACETONIDE 100000; 1 [USP'U]/G; MG/G
1 OINTMENT TOPICAL 2 TIMES DAILY
Qty: 60 G | Refills: 5 | Status: SHIPPED | OUTPATIENT
Start: 2024-06-26

## 2024-06-26 RX ORDER — VALSARTAN 80 MG/1
80 TABLET ORAL DAILY
Qty: 90 TABLET | Refills: 1 | Status: SHIPPED | OUTPATIENT
Start: 2024-06-26

## 2024-06-26 RX ORDER — PROGESTERONE 100 MG/1
100 CAPSULE ORAL DAILY
Qty: 90 CAPSULE | Refills: 2 | Status: SHIPPED | OUTPATIENT
Start: 2024-06-26

## 2024-06-26 RX ORDER — HYDROCHLOROTHIAZIDE 50 MG/1
50 TABLET ORAL DAILY
Qty: 90 TABLET | Refills: 1 | Status: SHIPPED | OUTPATIENT
Start: 2024-06-26

## 2024-06-26 RX ORDER — ESTRADIOL 0.5 MG/1
0.5 TABLET ORAL DAILY
Qty: 90 TABLET | Refills: 2 | Status: SHIPPED | OUTPATIENT
Start: 2024-06-26

## 2024-06-26 NOTE — PROGRESS NOTES
"Subjective   Rakel Velarde is a 63 y.o. female.     Hypertension  Associated symptoms include anxiety. Pertinent negatives include no blurred vision.   Hyperlipidemia    Heartburn  She reports no choking.   Anxiety   Patient reports no depressed mood or suicidal ideas. Her past medical history is significant for depression.   DepressionPatient is not experiencing: choking sensation, depressed mood and suicidal ideas.  Her past medical history is significant for depression.       Since the last visit, she has overall felt well.  She has Primary Hypertension and well controlled on current medication, Impaired fasting glucose and will monitor labs to watch for DMII, GERD controlled on PPI Rx, Hyperlipidemia and working on this with diet and exercise, and Vitamin D deficiency and will update labs for continued management.  she has been compliant with current medications have reviewed them.  The patient denies medication side effects.  Will refill medications. /72   Pulse 80   Resp 16   Ht 165.1 cm (65\")   Wt 88.9 kg (196 lb)   LMP  (LMP Unknown)   SpO2 97%   BMI 32.62 kg/m² .  BMI is >= 30 and <35. (Class 1 Obesity). The following options were offered after discussion;: weight loss educational material (shared in after visit summary), exercise counseling/recommendations, and nutrition counseling/recommendations  Rakel Velarde female 63 y.o., /72   Pulse 80   Resp 16   Ht 165.1 cm (65\")   Wt 88.9 kg (196 lb)   LMP  (LMP Unknown)   SpO2 97%   BMI 32.62 kg/m²   who presents today for follow up of Depression and Anxiety.  She reports medication is working well, patient desires to continue on Rx, and needs refill. Onset of symptoms was approximately several years ago. She denies current suicidal and homicidal ideation. Risk factors are family history of anxiety and or depression and lifestyle of multiple roles.  Previous treatment includes current Rx. She complains of the following medication " side effects:none. The patient declines to go to counseling..    She is on HRT -----for Osteopenia   A1C was great 12-27-24 5.5  A1C was 4 yrs ago 5.8%    12/28/2023  7:22 AM EST       Your cholesterol rescore increased just a bit but is still less than 7.5%.  Will continue to advise diet and exercise.  Other labs are in acceptable range.  Your average glucose hemoglobin A1c is fantastic at 5.5%.  The 10-year ASCVD risk score (Cesilia DK, et al., 2019) is: 6.5%     Walking for exercise    She is seeing GI--Gale Crawford PA-C with DR Leonardo--had colonoscopy 5-6-24. ---recall 2 yr    Continue generic Valtrex for herpes simplex suppression working well   Cutivate cream worked well for intrinsic eczema.  Also on Celexa for anxiety and depression was working well---dose is working  DR Cesar for obst sleep apnea--Cpap  Normal  DEXA 10-17-22   Daughter is 36--has breast cancer.   DR Cesar for obst sleep apnea--Cpap   Has history of perioral dermatitis the Mycolog works fantastic   The following portions of the patient's history were reviewed and updated as appropriate: allergies, current medications, past family history, past medical history, past social history, past surgical history, and problem list.    Review of Systems   Constitutional:  Negative for diaphoresis.   HENT:  Negative for nosebleeds and trouble swallowing.    Eyes:  Negative for blurred vision and visual disturbance.   Respiratory:  Negative for choking.    Gastrointestinal:  Negative for blood in stool.   Allergic/Immunologic: Negative for immunocompromised state.   Neurological:  Negative for facial asymmetry and speech difficulty.   Psychiatric/Behavioral:  Negative for self-injury, suicidal ideas and depressed mood.        Objective   Physical Exam  Vitals and nursing note reviewed.   Constitutional:       General: She is not in acute distress.     Appearance: Normal appearance. She is well-developed. She is not ill-appearing or toxic-appearing.    HENT:      Head: Normocephalic.      Right Ear: External ear normal.      Left Ear: External ear normal.      Nose: Nose normal.      Mouth/Throat:      Pharynx: Oropharynx is clear.   Eyes:      General: No scleral icterus.     Conjunctiva/sclera: Conjunctivae normal.      Pupils: Pupils are equal, round, and reactive to light.   Neck:      Thyroid: No thyromegaly.      Vascular: No carotid bruit.   Cardiovascular:      Rate and Rhythm: Normal rate and regular rhythm.      Pulses: Normal pulses.      Heart sounds: Normal heart sounds. No murmur heard.  Pulmonary:      Effort: Pulmonary effort is normal. No respiratory distress.      Breath sounds: Normal breath sounds.   Musculoskeletal:         General: No deformity. Normal range of motion.      Cervical back: Normal range of motion and neck supple.      Right lower leg: Edema present.      Left lower leg: Edema present.      Comments: Trace pedal edema = bilat     Skin:     General: Skin is warm and dry.      Findings: No rash.   Neurological:      General: No focal deficit present.      Mental Status: She is alert and oriented to person, place, and time. Mental status is at baseline.   Psychiatric:         Mood and Affect: Mood normal.         Behavior: Behavior normal.         Thought Content: Thought content normal.         Judgment: Judgment normal.           Assessment & Plan   Diagnoses and all orders for this visit:    1. Primary hypertension (Primary)  -     Comprehensive metabolic panel; Future  -     Lipid panel; Future  -     CBC and Differential; Future  -     TSH; Future  -     Hemoglobin A1c; Future  -     Magnesium; Future  -     T4, Free; Future  -     Urinalysis With Microscopic - Urine, Clean Catch; Future  -     Vitamin D,25-Hydroxy; Future  -     Vitamin B12; Future  -     Folate; Future    2. Mixed hyperlipidemia  -     Comprehensive metabolic panel; Future  -     Lipid panel; Future  -     CBC and Differential; Future  -     TSH; Future  -      Hemoglobin A1c; Future  -     Magnesium; Future  -     T4, Free; Future  -     Urinalysis With Microscopic - Urine, Clean Catch; Future  -     Vitamin D,25-Hydroxy; Future  -     Vitamin B12; Future  -     Folate; Future    3. Ulcerative rectosigmoiditis without complication  -     Comprehensive metabolic panel; Future  -     Lipid panel; Future  -     CBC and Differential; Future  -     TSH; Future  -     Hemoglobin A1c; Future  -     Magnesium; Future  -     T4, Free; Future  -     Urinalysis With Microscopic - Urine, Clean Catch; Future  -     Vitamin D,25-Hydroxy; Future  -     Vitamin B12; Future  -     Folate; Future    4. Vitamin D deficiency  -     Comprehensive metabolic panel; Future  -     Lipid panel; Future  -     CBC and Differential; Future  -     TSH; Future  -     Hemoglobin A1c; Future  -     Magnesium; Future  -     T4, Free; Future  -     Urinalysis With Microscopic - Urine, Clean Catch; Future  -     Vitamin D,25-Hydroxy; Future  -     Vitamin B12; Future  -     Folate; Future    5. Anxiety  -     Comprehensive metabolic panel; Future  -     Lipid panel; Future  -     CBC and Differential; Future  -     TSH; Future  -     Hemoglobin A1c; Future  -     Magnesium; Future  -     T4, Free; Future  -     Urinalysis With Microscopic - Urine, Clean Catch; Future  -     Vitamin D,25-Hydroxy; Future  -     Vitamin B12; Future  -     Folate; Future    6. Intrinsic eczema  -     Comprehensive metabolic panel; Future  -     Lipid panel; Future  -     CBC and Differential; Future  -     TSH; Future  -     Hemoglobin A1c; Future  -     Magnesium; Future  -     T4, Free; Future  -     Urinalysis With Microscopic - Urine, Clean Catch; Future  -     Vitamin D,25-Hydroxy; Future  -     Vitamin B12; Future  -     Folate; Future    7. Perioral dermatitis  -     Comprehensive metabolic panel; Future  -     Lipid panel; Future  -     CBC and Differential; Future  -     TSH; Future  -     Hemoglobin A1c; Future  -      Magnesium; Future  -     T4, Free; Future  -     Urinalysis With Microscopic - Urine, Clean Catch; Future  -     Vitamin D,25-Hydroxy; Future  -     Vitamin B12; Future  -     Folate; Future    8. Gastro-esophageal reflux disease without esophagitis  -     Comprehensive metabolic panel; Future  -     Lipid panel; Future  -     CBC and Differential; Future  -     TSH; Future  -     Hemoglobin A1c; Future  -     Magnesium; Future  -     T4, Free; Future  -     Urinalysis With Microscopic - Urine, Clean Catch; Future  -     Vitamin D,25-Hydroxy; Future  -     Vitamin B12; Future  -     Folate; Future    Other orders  -     estradiol (ESTRACE) 0.5 MG tablet; Take 1 tablet by mouth Daily. With Prometrium  Dispense: 90 tablet; Refill: 2  -     Progesterone (PROMETRIUM) 100 MG capsule; Take 1 capsule by mouth Daily. With Estrace  Dispense: 90 capsule; Refill: 2    Mycolog works fantastic for perioral dermatitis  Continue Celexa working well for anxiety and depression  Use Cpap/Bipap every night  Plan, Rakel Velarde, was seen today.  she was seen for HTN and continue medication, Imparied fasting glucose and plan follow up labs, diet, and exercise, Hyperlipidemia and will work on this with diet and exercise, and Vitamin D deficiency and will update labs   She is taking Estrace QOD--still on Prometrium---trying lowest dose. Aware risk of VTE and breast cancer. Vagafem works for atrophy  Continue generic Valtrex for herpes simplex suppression working well   Cutivate cream worked well for intrinsic eczema.   Has history of perioral dermatitis the Mycolog works fantastic   DR Cesar for obst sleep apnea--Cpap   I have mammo and DEXA ordered for Oct

## 2024-06-28 ENCOUNTER — TELEPHONE (OUTPATIENT)
Dept: GASTROENTEROLOGY | Facility: CLINIC | Age: 64
End: 2024-06-28
Payer: MEDICAID

## 2024-06-28 NOTE — TELEPHONE ENCOUNTER
Per verbal order of liset Beasley for patient to switch from Nexium to Prevacid solutab.    [employer] PAP paperwork completed and faxed. Patient notified.

## 2024-07-11 DIAGNOSIS — Z12.31 ENCOUNTER FOR SCREENING MAMMOGRAM FOR BREAST CANCER: Primary | ICD-10-CM

## 2024-07-11 DIAGNOSIS — Z78.0 POST-MENOPAUSAL: ICD-10-CM

## 2024-08-16 ENCOUNTER — OFFICE VISIT (OUTPATIENT)
Dept: SLEEP MEDICINE | Facility: HOSPITAL | Age: 64
End: 2024-08-16
Payer: MEDICARE

## 2024-08-16 VITALS — BODY MASS INDEX: 33.15 KG/M2 | OXYGEN SATURATION: 97 % | WEIGHT: 199 LBS | HEIGHT: 65 IN | HEART RATE: 79 BPM

## 2024-08-16 DIAGNOSIS — G47.33 OBSTRUCTIVE SLEEP APNEA: Primary | ICD-10-CM

## 2024-08-16 DIAGNOSIS — E66.9 OBESITY (BMI 30-39.9): ICD-10-CM

## 2024-08-16 PROCEDURE — G0463 HOSPITAL OUTPT CLINIC VISIT: HCPCS

## 2024-08-16 PROCEDURE — 1159F MED LIST DOCD IN RCRD: CPT | Performed by: FAMILY MEDICINE

## 2024-08-16 PROCEDURE — 99213 OFFICE O/P EST LOW 20 MIN: CPT | Performed by: FAMILY MEDICINE

## 2024-08-16 PROCEDURE — 1160F RVW MEDS BY RX/DR IN RCRD: CPT | Performed by: FAMILY MEDICINE

## 2024-08-16 NOTE — PROGRESS NOTES
"Follow Up [facility] Disorders Center Note     Chief Complaint:  JOSÉ     Primary Care Physician: Alda Concepcion, GRANT    Interval History:   The patient is a 64 y.o. female  who was last seen in the sleep lab: 7/19/2023.  HST September 2022 showed AHI 16 events per hour lowest SPO2 82%.  On auto CPAP 6-18 cm H2O.  SAKSHI was originally slightly elevated at 3.3.  Patient was advised to continue auto CPAP and talk to DME about mask fitting to consider nasal cushion mask.  She was also referred to dental sleep specialist to consider oral mandibular device per patient request.  Never received call; requesting referral again today.  At last visit patient was doing well with auto CPAP.  Today reports mask does not fit well and leaks air she is having excessive dry mouth and her last mask change was 3 months ago.    Downloaded PAP Data Reviewed For Compliance:  DME is [employer].  Downloads between 5/18/2024 - 8/15/2024.  Average usage is 7 hours 15 minutes.  Average AHI is 4.7.  Average auto CPAP pressure is 13.4 cm H2O    I have reviewed the above results and compared them with the patient's last downloads and reviewed with the patient.    Review of Systems:    A complete review of systems was done and all were negative with the exception of all negative    Social History:    Social History     Socioeconomic History    Marital status:    Tobacco Use    Smoking status: Never     Passive exposure: Never    Smokeless tobacco: Never   Vaping Use    Vaping status: Never Used   Substance and Sexual Activity    Alcohol use: Yes     Alcohol/week: 4.0 standard drinks of alcohol     Types: 4 Glasses of wine per week     Comment: socially    Drug use: No    Sexual activity: Not Currently     Partners: Male     Birth control/protection: Post-menopausal       Allergies:  Lisinopril     Medication Review:  Reviewed.      Vital Signs:    Vitals:    08/16/24 0929   Pulse: 79   SpO2: 97%   Weight: 90.3 kg (199 lb)   Height: 165.1 cm (65\") "     Body mass index is 33.12 kg/m².    Physical Exam:    Constitutional:  Well developed 64 y.o. female that appears in no apparent distress.  Awake & oriented times 3.  Normal mood with normal recent and remote memory and normal judgement.  Eyes:  Conjunctivae normal.  Oropharynx: Previously, moist mucous membranes.    Self-administered Stinnett Sleepiness Scale test results: 3  0-5 Lower normal daytime sleepiness  6-10 Higher normal daytime sleepiness  11-12 Mild, 13-15 Moderate, & 16-24 Severe excessive daytime sleepiness    Impression:   1. Obstructive sleep apnea    2. Obesity (BMI 30-39.9)        Obstructive sleep apnea adequately treated with auto CPAP 12-15 cm H2O. The patient appears to be at goal with good compliance and usage.     Plan:  Good sleep hygiene measures should be maintained.  Weight loss would be beneficial in this patient who is obese by Body mass index is 33.12 kg/m²..      After evaluating the patient and assessing results available, the patient is benefiting from the treatment being provided.     The patient will continue CPAP.  After clinical evaluation and review of downloads, I recommend no changes to the patient's pressures.  A new prescription will be sent to the patient's DME.    Caution during activities that require prolonged concentration is strongly advised if sleepiness returns. Changing of PAP supplies regularly is important for effective use. Patient needs to change cushion on the mask or plugs on nasal pillows along with disposable filters once every month and change mask frame, tubing, headgear and Velcro straps every 6 months at the minimum.    I answered all of the patient's questions.  The patient will call for any problems and will follow up in 1 year.      Mt Cesar MD  Sleep Medicine  08/16/24  09:46 EDT

## 2024-09-10 ENCOUNTER — OFFICE VISIT (OUTPATIENT)
Dept: FAMILY MEDICINE CLINIC | Facility: CLINIC | Age: 64
End: 2024-09-10
Payer: MEDICARE

## 2024-09-10 VITALS
BODY MASS INDEX: 32.99 KG/M2 | WEIGHT: 198 LBS | HEART RATE: 74 BPM | RESPIRATION RATE: 16 BRPM | DIASTOLIC BLOOD PRESSURE: 72 MMHG | SYSTOLIC BLOOD PRESSURE: 130 MMHG | OXYGEN SATURATION: 97 % | TEMPERATURE: 97.3 F | HEIGHT: 65 IN

## 2024-09-10 DIAGNOSIS — E66.09 CLASS 1 OBESITY DUE TO EXCESS CALORIES WITH SERIOUS COMORBIDITY AND BODY MASS INDEX (BMI) OF 32.0 TO 32.9 IN ADULT: Primary | ICD-10-CM

## 2024-09-10 PROBLEM — E66.811 CLASS 1 OBESITY DUE TO EXCESS CALORIES WITH SERIOUS COMORBIDITY AND BODY MASS INDEX (BMI) OF 32.0 TO 32.9 IN ADULT: Status: ACTIVE | Noted: 2024-09-10

## 2024-09-10 PROCEDURE — 3075F SYST BP GE 130 - 139MM HG: CPT | Performed by: PHYSICIAN ASSISTANT

## 2024-09-10 PROCEDURE — 3078F DIAST BP <80 MM HG: CPT | Performed by: PHYSICIAN ASSISTANT

## 2024-09-10 PROCEDURE — 1126F AMNT PAIN NOTED NONE PRSNT: CPT | Performed by: PHYSICIAN ASSISTANT

## 2024-09-10 PROCEDURE — 1159F MED LIST DOCD IN RCRD: CPT | Performed by: PHYSICIAN ASSISTANT

## 2024-09-10 PROCEDURE — 99213 OFFICE O/P EST LOW 20 MIN: CPT | Performed by: PHYSICIAN ASSISTANT

## 2024-09-10 PROCEDURE — 1160F RVW MEDS BY RX/DR IN RCRD: CPT | Performed by: PHYSICIAN ASSISTANT

## 2024-09-10 NOTE — Clinical Note
Need permission to start her on Wegovy, semaglutide to help get her weight down..... Is that okay with her Pentasa for the ulcerative colitis maintenance?

## 2024-09-10 NOTE — PROGRESS NOTES
"Subjective   Rakel Velarde is a 64 y.o. female.     History of Present Illness   Rakel Velarde 64 y.o. female /72   Pulse 74   Temp 97.3 °F (36.3 °C)   Resp 16   Ht 165.1 cm (65\")   Wt 89.8 kg (198 lb)   LMP  (LMP Unknown)   SpO2 97%   BMI 32.95 kg/m²  who presents today for loss management for class I obesity with comorbidity factors.  BMI 32.9 she has a history of   Patient Active Problem List   Diagnosis    Anxiety and depression    Gastro-esophageal reflux disease without esophagitis    Osteopenia    Obesity (BMI 30-39.9)    Ulcerative colitis    Vitamin D deficiency    Hyperlipidemia    Anxiety    Hypertension    HSV infection    Hallux valgus    Ulcerative rectosigmoiditis with rectal bleeding    Diarrhea due to malabsorption    Intrinsic eczema    Perioral dermatitis    Lichen planus    Family history of esophageal cancer    Class 1 obesity due to excess calories with serious comorbidity and body mass index (BMI) of 32.0 to 32.9 in adult   Her BMI is 32.9 and in the obesity category and has the following comorbidities  Mixed hyperlipidemia---Last labs were 12/27/2023 and her cholesterol rescore was 6.5%  Has history of impaired fasting glucose  Has primary hypertension  Has obstructive sleep apnea  Has GERD  Walking 4 times a week  Has no history of gastroparesis and has no history of pancreatitis  No family history of thyroid cancer or M EN  She does take Pentasa for management of ulcerative colitis and is not currently having any symptoms---sees GI--- I have already sent message to Gale Crawford PA-C asking permission to start her on semaglutide with her known history of ulcerative colitis with current medication Pentasa  Saw her for follow-up on her primary hypertension, impaired fasting glucose, GERD, mixed hyperlipidemia, vitamin D deficiency on 6/26/2024 visit  Also noted she is on HRT for osteopenia  Sees GI ulcerative colitis and GERD  Also noted last visit the generic Celexa was " working well for anxiety and depression    Use Cpap/Bipap every night    She has food cravings and stays hungry.  Cannot lose weight and weight is up  The following portions of the patient's history were reviewed and updated as appropriate: allergies, current medications, past family history, past medical history, past social history, past surgical history, and problem list.    Review of Systems   Constitutional:  Negative for diaphoresis.   HENT:  Negative for nosebleeds and trouble swallowing.    Eyes:  Negative for blurred vision and visual disturbance.   Respiratory:  Negative for choking.    Gastrointestinal:  Negative for blood in stool.   Allergic/Immunologic: Negative for immunocompromised state.   Neurological:  Negative for facial asymmetry and speech difficulty.   Psychiatric/Behavioral:  Negative for self-injury and suicidal ideas.        Objective   Physical Exam  Vitals and nursing note reviewed.   Constitutional:       General: She is not in acute distress.     Appearance: She is well-developed. She is obese. She is not ill-appearing or toxic-appearing.   HENT:      Head: Normocephalic.      Right Ear: External ear normal.      Left Ear: External ear normal.      Nose: Nose normal.      Mouth/Throat:      Pharynx: Oropharynx is clear.   Eyes:      General: No scleral icterus.     Conjunctiva/sclera: Conjunctivae normal.      Pupils: Pupils are equal, round, and reactive to light.   Neck:      Thyroid: No thyromegaly.   Cardiovascular:      Rate and Rhythm: Normal rate and regular rhythm.      Pulses: Normal pulses.      Heart sounds: Normal heart sounds. No murmur heard.  Pulmonary:      Effort: Pulmonary effort is normal. No respiratory distress.      Breath sounds: Normal breath sounds.   Musculoskeletal:         General: No deformity. Normal range of motion.      Cervical back: Normal range of motion and neck supple.   Skin:     General: Skin is warm and dry.      Findings: No rash.   Neurological:       General: No focal deficit present.      Mental Status: She is alert and oriented to person, place, and time. Mental status is at baseline.   Psychiatric:         Mood and Affect: Mood normal.         Behavior: Behavior normal.         Thought Content: Thought content normal.         Judgment: Judgment normal.           Assessment & Plan   Diagnoses and all orders for this visit:    1. Class 1 obesity due to excess calories with serious comorbidity and body mass index (BMI) of 32.0 to 32.9 in adult (Primary)          Her BMI is 32.9 and in the obesity category and has the following comorbidities  Mixed hyperlipidemia---Last labs were 12/27/2023 and her cholesterol rescore was 6.5%  Has history of impaired fasting glucose  Has primary hypertension  Has obstructive sleep apnea  Has GERD  Consider semaglutide compound if Wegovy is not covered--she is agreeable     As noted on her June visit her medications were working well and I provided refills  I did send message to Gale Crawford PA-C asking permission to start her on semaglutide with her history of GERD and ulcerative colitis requiring treatment with Pentasa\  Patient aware that if she does start semaglutide it makes her at risk for nausea vomiting diarrhea could flareup her ulcerative colitis and GERD, could also cause acute pancreatitis with 3% chance      Addendum 9/11/2024 Gale Crawford PA-C message me back that is okay to for patient to take Wegovy and noted just a reminder GI side effect

## 2024-09-11 RX ORDER — SEMAGLUTIDE 0.25 MG/.5ML
0.25 INJECTION, SOLUTION SUBCUTANEOUS WEEKLY
Qty: 2 ML | Refills: 0 | Status: SHIPPED | OUTPATIENT
Start: 2024-09-11

## 2024-10-08 ENCOUNTER — PATIENT MESSAGE (OUTPATIENT)
Dept: FAMILY MEDICINE CLINIC | Facility: CLINIC | Age: 64
End: 2024-10-08
Payer: MEDICARE

## 2024-10-11 RX ORDER — SEMAGLUTIDE 0.25 MG/.5ML
0.25 INJECTION, SOLUTION SUBCUTANEOUS WEEKLY
Qty: 2 ML | Refills: 5 | Status: SHIPPED | OUTPATIENT
Start: 2024-10-11 | End: 2024-10-11 | Stop reason: SDUPTHER

## 2024-10-11 RX ORDER — SEMAGLUTIDE 0.25 MG/.5ML
0.25 INJECTION, SOLUTION SUBCUTANEOUS WEEKLY
Qty: 2 ML | Refills: 5 | Status: SHIPPED | OUTPATIENT
Start: 2024-10-11

## 2024-10-21 DIAGNOSIS — I10 ESSENTIAL HYPERTENSION: ICD-10-CM

## 2024-10-21 RX ORDER — HYDROCHLOROTHIAZIDE 50 MG/1
50 TABLET ORAL DAILY
Qty: 90 TABLET | Refills: 1 | Status: SHIPPED | OUTPATIENT
Start: 2024-10-21

## 2024-11-27 ENCOUNTER — PATIENT MESSAGE (OUTPATIENT)
Dept: FAMILY MEDICINE CLINIC | Facility: CLINIC | Age: 64
End: 2024-11-27
Payer: MEDICARE

## 2024-11-27 RX ORDER — SEMAGLUTIDE 0.5 MG/.5ML
0.5 INJECTION, SOLUTION SUBCUTANEOUS WEEKLY
Qty: 2 ML | Refills: 2 | Status: SHIPPED | OUTPATIENT
Start: 2024-11-27

## 2024-12-13 ENCOUNTER — TELEPHONE (OUTPATIENT)
Dept: SLEEP MEDICINE | Facility: HOSPITAL | Age: 64
End: 2024-12-13
Payer: COMMERCIAL

## 2024-12-17 ENCOUNTER — OFFICE VISIT (OUTPATIENT)
Dept: FAMILY MEDICINE CLINIC | Facility: CLINIC | Age: 64
End: 2024-12-17
Payer: MEDICARE

## 2024-12-17 VITALS
WEIGHT: 179 LBS | HEIGHT: 65 IN | SYSTOLIC BLOOD PRESSURE: 110 MMHG | TEMPERATURE: 97.9 F | HEART RATE: 71 BPM | BODY MASS INDEX: 29.82 KG/M2 | DIASTOLIC BLOOD PRESSURE: 78 MMHG | RESPIRATION RATE: 16 BRPM | OXYGEN SATURATION: 97 %

## 2024-12-17 DIAGNOSIS — E66.9 OBESITY (BMI 30-39.9): ICD-10-CM

## 2024-12-17 DIAGNOSIS — Z78.0 POST-MENOPAUSAL: ICD-10-CM

## 2024-12-17 DIAGNOSIS — T75.3XXA MOTION SICKNESS, INITIAL ENCOUNTER: ICD-10-CM

## 2024-12-17 DIAGNOSIS — Z12.31 ENCOUNTER FOR SCREENING MAMMOGRAM FOR BREAST CANCER: ICD-10-CM

## 2024-12-17 DIAGNOSIS — K21.9 GASTRO-ESOPHAGEAL REFLUX DISEASE WITHOUT ESOPHAGITIS: ICD-10-CM

## 2024-12-17 DIAGNOSIS — F32.A ANXIETY AND DEPRESSION: ICD-10-CM

## 2024-12-17 DIAGNOSIS — F41.9 ANXIETY AND DEPRESSION: ICD-10-CM

## 2024-12-17 DIAGNOSIS — I10 PRIMARY HYPERTENSION: Primary | ICD-10-CM

## 2024-12-17 DIAGNOSIS — F41.9 ANXIETY: ICD-10-CM

## 2024-12-17 DIAGNOSIS — L20.84 INTRINSIC ECZEMA: ICD-10-CM

## 2024-12-17 DIAGNOSIS — E78.2 MIXED HYPERLIPIDEMIA: ICD-10-CM

## 2024-12-17 PROCEDURE — 3074F SYST BP LT 130 MM HG: CPT | Performed by: PHYSICIAN ASSISTANT

## 2024-12-17 PROCEDURE — 99214 OFFICE O/P EST MOD 30 MIN: CPT | Performed by: PHYSICIAN ASSISTANT

## 2024-12-17 PROCEDURE — 1159F MED LIST DOCD IN RCRD: CPT | Performed by: PHYSICIAN ASSISTANT

## 2024-12-17 PROCEDURE — 1160F RVW MEDS BY RX/DR IN RCRD: CPT | Performed by: PHYSICIAN ASSISTANT

## 2024-12-17 PROCEDURE — 3078F DIAST BP <80 MM HG: CPT | Performed by: PHYSICIAN ASSISTANT

## 2024-12-17 PROCEDURE — 1126F AMNT PAIN NOTED NONE PRSNT: CPT | Performed by: PHYSICIAN ASSISTANT

## 2024-12-17 RX ORDER — HYDROCHLOROTHIAZIDE 25 MG/1
25 TABLET ORAL DAILY
Qty: 90 TABLET | Refills: 1 | Status: SHIPPED | OUTPATIENT
Start: 2024-12-17

## 2024-12-17 RX ORDER — CITALOPRAM HYDROBROMIDE 20 MG/1
20 TABLET ORAL DAILY
Qty: 90 TABLET | Refills: 3 | Status: SHIPPED | OUTPATIENT
Start: 2024-12-17

## 2024-12-17 RX ORDER — SCOLOPAMINE TRANSDERMAL SYSTEM 1 MG/1
1 PATCH, EXTENDED RELEASE TRANSDERMAL
Qty: 4 PATCH | Refills: 1 | Status: SHIPPED | OUTPATIENT
Start: 2024-12-17

## 2024-12-17 NOTE — PROGRESS NOTES
"Carlos Ellington THOMAS Velarde is a 64 y.o. female.    Since the last visit, she has overall felt tired.  She has primary hypertension with her excellent weight loss blood pressures too low today I am lowering her hydrochlorothiazide to 25 mg daily and valsartan to 40 mg daily she has had some dizziness when she stands at times ,Hyperlipidemia and working on this with diet and exercise, Seasonal allergies and doing well on their medication , and Vitamin D deficiency and labs are at goal >30 ng/mL.  she has been compliant with current medications have reviewed them.  The patient denies medication side effects.  Will refill medications. /62 (BP Location: Left arm, Patient Position: Sitting, Cuff Size: Adult)   Pulse 71   Temp 97.9 °F (36.6 °C) (Temporal)   Resp 16   Ht 165.1 cm (65\")   Wt 81.2 kg (179 lb)   LMP  (LMP Unknown)   SpO2 97%   BMI 29.79 kg/m² .          Results for orders placed or performed in visit on 12/01/24   Comprehensive metabolic panel    Collection Time: 12/11/24  9:04 AM    Specimen: Blood   Result Value Ref Range    Glucose 88 65 - 99 mg/dL    BUN 12 8 - 23 mg/dL    Creatinine 0.71 0.57 - 1.00 mg/dL    EGFR Result 94.5 >60.0 mL/min/1.73    BUN/Creatinine Ratio 16.9 7.0 - 25.0    Sodium 133 (L) 136 - 145 mmol/L    Potassium 3.9 3.5 - 5.2 mmol/L    Chloride 93 (L) 98 - 107 mmol/L    Total CO2 27.4 22.0 - 29.0 mmol/L    Calcium 9.8 8.6 - 10.5 mg/dL    Total Protein 7.4 6.0 - 8.5 g/dL    Albumin 4.0 3.5 - 5.2 g/dL    Globulin 3.4 gm/dL    A/G Ratio 1.2 g/dL    Total Bilirubin 0.4 0.0 - 1.2 mg/dL    Alkaline Phosphatase 70 39 - 117 U/L    AST (SGOT) 19 1 - 32 U/L    ALT (SGPT) 21 1 - 33 U/L   Lipid panel    Collection Time: 12/11/24  9:04 AM    Specimen: Blood   Result Value Ref Range    Total Cholesterol 199 0 - 200 mg/dL    Triglycerides 133 0 - 150 mg/dL    HDL Cholesterol 47 40 - 60 mg/dL    VLDL Cholesterol Evgeny 24 5 - 40 mg/dL    LDL Chol Calc ([facility]) 128 (H) 0 - 100 mg/dL   TSH    " Collection Time: 12/11/24  9:04 AM    Specimen: Blood   Result Value Ref Range    TSH 1.980 0.270 - 4.200 uIU/mL   Hemoglobin A1c    Collection Time: 12/11/24  9:04 AM    Specimen: Blood   Result Value Ref Range    Hemoglobin A1C 5.00 4.80 - 5.60 %   Magnesium    Collection Time: 12/11/24  9:04 AM    Specimen: Blood   Result Value Ref Range    Magnesium 1.9 1.6 - 2.4 mg/dL   T4, Free    Collection Time: 12/11/24  9:04 AM    Specimen: Blood   Result Value Ref Range    Free T4 1.17 0.92 - 1.68 ng/dL   Vitamin D,25-Hydroxy    Collection Time: 12/11/24  9:04 AM    Specimen: Blood   Result Value Ref Range    25 Hydroxy, Vitamin D 65.0 30.0 - 100.0 ng/mL   Vitamin B12    Collection Time: 12/11/24  9:04 AM    Specimen: Blood   Result Value Ref Range    Vitamin B-12 1,051 (H) 211 - 946 pg/mL   Folate    Collection Time: 12/11/24  9:04 AM    Specimen: Blood   Result Value Ref Range    Folate 14.50 4.78 - 24.20 ng/mL   Microscopic Examination -    Collection Time: 12/11/24  9:04 AM   Result Value Ref Range    WBC, UA 0-2 /HPF    RBC, UA Comment /HPF    Epithelial Cells (non renal) 7-12 (A) /HPF    Cast Type Comment     Bacteria, UA Comment None Seen /HPF    Yeast, UA See below: (A) None Seen /HPF   CBC and Differential    Collection Time: 12/11/24  9:04 AM    Specimen: Blood   Result Value Ref Range    WBC 7.46 3.40 - 10.80 10*3/mm3    RBC 4.24 3.77 - 5.28 10*6/mm3    Hemoglobin 13.2 12.0 - 15.9 g/dL    Hematocrit 39.6 34.0 - 46.6 %    MCV 93.4 79.0 - 97.0 fL    MCH 31.1 26.6 - 33.0 pg    MCHC 33.3 31.5 - 35.7 g/dL    RDW 13.3 12.3 - 15.4 %    Platelets 337 140 - 450 10*3/mm3    Neutrophil Rel % 50.6 42.7 - 76.0 %    Lymphocyte Rel % 39.8 19.6 - 45.3 %    Monocyte Rel % 6.6 5.0 - 12.0 %    Eosinophil Rel % 2.0 0.3 - 6.2 %    Basophil Rel % 0.7 0.0 - 1.5 %    Neutrophils Absolute 3.78 1.70 - 7.00 10*3/mm3    Lymphocytes Absolute 2.97 0.70 - 3.10 10*3/mm3    Monocytes Absolute 0.49 0.10 - 0.90 10*3/mm3    Eosinophils Absolute  0.15 0.00 - 0.40 10*3/mm3    Basophils Absolute 0.05 0.00 - 0.20 10*3/mm3    Immature Granulocyte Rel % 0.3 0.0 - 0.5 %    Immature Grans Absolute 0.02 0.00 - 0.05 10*3/mm3    nRBC 0.0 0.0 - 0.2 /100 WBC   Urinalysis With Microscopic - Urine, Clean Catch    Collection Time: 12/11/24  9:04 AM    Specimen: Urine, Clean Catch   Result Value Ref Range    Specific Gravity, UA 1.015 1.005 - 1.030    pH, UA 7.0 5.0 - 8.0    Color, UA Yellow     Appearance, UA Clear Clear    Leukocytes, UA Negative Negative    Protein Negative Negative    Glucose, UA Negative Negative    Ketones Negative Negative    Blood, UA Negative Negative    Bilirubin, UA Negative Negative    Urobilinogen, UA Comment     Nitrite, UA Negative Negative       The 10-year ASCVD risk score (Cesilia WYATT, et al., 2019) is: 5.9%    Values used to calculate the score:      Age: 64 years      Sex: Female      Is Non- : No      Diabetic: No      Tobacco smoker: No      Systolic Blood Pressure: 110 mmHg      Is BP treated: Yes      HDL Cholesterol: 47 mg/dL      Total Cholesterol: 199 mg/dL    History of Present Illness  The patient presents for a follow-up visit.    She was last seen in 09/2024, during which the potential use of GLP-1 agonist for obesity management was discussed. She has been on a compounded medication, currently at a dosage of 0.5, with this being her second injection. Despite this, she continues to experience cravings for sweets, although not to an obsessive extent. She has lost 15 pounds over the past 3 months, but the rate of weight loss has slowed since increasing the dosage to 0.5. She maintains a regular exercise regimen, walking four times a week.    She occasionally experiences dizziness upon standing, which she attributes to her blood pressure medication. She monitors her blood pressure at home, although not frequently. She also reports increased fatigue.    She is on Valtrex for herpes simplex suppression and is  doing well.    She uses Q-tip cream as needed for eczema, which currently affects the tops of her feet and lower part of her hairline.    She is on Celexa for anxiety and depression.    She is on hormone replacement therapy, taking estrogen every other day and progesterone daily.    She is on Pentasa for ulcerative colitis.    She is on Nexium for GERD.    She is on valsartan for primary hypertension.    She is on hydrochlorothiazide for primary hypertension.    Supplemental Information  She is on magnesium supplement. She is on Vagifem. She is on Mycolog for perioral dermatitis. She is on CPAP for obstructive sleep apnea.    FAMILY HISTORY  Her daughter had breast cancer at 36.    MEDICATIONS  Current: Pentasa, Celexa, Valtrex, Q-tip cream, Nexium, valsartan, hydrochlorothiazide, progesterone, Estrace, Vagifem       The following portions of the patient's history were reviewed and updated as appropriate: allergies, current medications, past family history, past medical history, past social history, past surgical history, and problem list.    Review of Systems   Constitutional:  Negative for diaphoresis.   HENT:  Negative for nosebleeds and trouble swallowing.    Eyes:  Negative for blurred vision and visual disturbance.   Respiratory:  Negative for choking.    Gastrointestinal:  Negative for blood in stool.   Allergic/Immunologic: Negative for immunocompromised state.   Neurological:  Negative for facial asymmetry and speech difficulty.   Psychiatric/Behavioral:  Negative for self-injury and suicidal ideas.        Objective   Physical Exam  Vitals and nursing note reviewed.   Constitutional:       General: She is not in acute distress.     Appearance: She is well-developed. She is obese. She is not ill-appearing or toxic-appearing.   HENT:      Head: Normocephalic.      Right Ear: External ear normal.      Left Ear: External ear normal.      Nose: Nose normal.      Mouth/Throat:      Pharynx: Oropharynx is clear.    Eyes:      General: No scleral icterus.     Conjunctiva/sclera: Conjunctivae normal.      Pupils: Pupils are equal, round, and reactive to light.   Neck:      Thyroid: No thyromegaly.   Cardiovascular:      Rate and Rhythm: Normal rate and regular rhythm.      Pulses: Normal pulses.      Heart sounds: Normal heart sounds. No murmur heard.  Pulmonary:      Effort: Pulmonary effort is normal. No respiratory distress.      Breath sounds: Normal breath sounds. No rales.   Musculoskeletal:         General: No deformity. Normal range of motion.      Cervical back: Normal range of motion and neck supple.      Right lower leg: No edema.      Left lower leg: No edema.   Skin:     General: Skin is warm and dry.      Findings: No rash.   Neurological:      General: No focal deficit present.      Mental Status: She is alert and oriented to person, place, and time. Mental status is at baseline.   Psychiatric:         Mood and Affect: Mood normal.         Behavior: Behavior normal.         Thought Content: Thought content normal.         Judgment: Judgment normal.         Physical Exam  Vital Signs  Blood pressure reading is 110/78.       Results  Laboratory Studies  Cholesterol was mildly elevated. Impaired fasting glucose. Hemoglobin A1c dropped from 5.5 to 5.0. Liver enzymes are normal. Magnesium was normal. Sodium was slightly low. Urine test showed no microscopic blood.       Assessment & Plan   Diagnoses and all orders for this visit:    1. Primary hypertension (Primary)    2. Mixed hyperlipidemia    3. Intrinsic eczema    4. Anxiety    5. Gastro-esophageal reflux disease without esophagitis    6. Anxiety and depression    7. Post-menopausal  -     DEXA Bone Density Axial    8. Encounter for screening mammogram for breast cancer  -     Mammo Screening Digital Tomosynthesis Bilateral With CAD    9. Obesity (BMI 30-39.9)  Comments:  prior to semaglutide    Other orders  -     hydroCHLOROthiazide 25 MG tablet; Take 1 tablet  by mouth Daily. For BP  Dispense: 90 tablet; Refill: 1        Assessment & Plan  1. Obesity.  Her BMI has decreased from 32.9 to 29.8, indicating a significant improvement. She has lost 15 pounds over the past 3 months. She will continue her current regimen of semaglutide 0.5 mg for an additional 2 weeks. If weight loss plateaus, the dosage will be increased. She is advised to continue her diet and exercise routine.    2. Mixed hyperlipidemia.  Her cholesterol risk score remains below 7.5 percent, thus no additional medication is recommended at this time.    3. Impaired fasting glucose.  Her hemoglobin A1c has decreased from 5.5 to 5.0, indicating good control. She will continue her current management plan.    4. Primary hypertension.  Her blood pressure is slightly low due to weight loss. The dosage of hydrochlorothiazide will be reduced from 50 mg to 25 mg, and valsartan will be decreased to 40 mg. She is advised to monitor her blood pressure at home and report any significant changes.    5. Obstructive sleep apnea.  She is currently using a CPAP machine, which she finds uncomfortable but continues to use. No changes to her current management plan are necessary.    6. Gastroesophageal reflux disease (GERD).  She will continue her current management plan, including the use of Nexium.    7. Ulcerative colitis.  She will continue her current management plan, including the use of Pentasa.    8. Anxiety and depression.  She will continue her current management plan, including the use of Celexa.    9. Herpes simplex suppression.  She will continue her current management plan, including the use of Valtrex.    10. Eczema.  She will continue her current management plan, including the use of Q-tip cream as needed.    11. Hormone replacement therapy.  She will continue her current management plan, including the use of progesterone and Estrace.    12. Motion sickness.  A prescription for Transderm Scop patches has been  provided for her upcoming cruise.    13. Health maintenance.  Orders for a bone density test and mammogram have been placed. She is advised to schedule these appointments.  Monitor blood pressure with going down on the hydrochlorothiazide and valsartan..message me             Patient or patient representative verbalized consent for the use of Ambient Listening during the visit with  Alda Concepcion PA-C for chart documentation. 12/17/2024  13:30 EST

## 2024-12-27 ENCOUNTER — TELEPHONE (OUTPATIENT)
Dept: FAMILY MEDICINE CLINIC | Facility: CLINIC | Age: 64
End: 2024-12-27
Payer: COMMERCIAL

## 2024-12-27 RX ORDER — VALSARTAN 40 MG/1
40 TABLET ORAL DAILY
Qty: 90 TABLET | Refills: 1 | Status: SHIPPED | OUTPATIENT
Start: 2024-12-27

## 2024-12-27 NOTE — TELEPHONE ENCOUNTER
Pt requesting Valsartan 40 mg be sent to pharmacy:  [facility] [id number], [address], [place] [address] PH: [phone]

## 2025-01-21 RX ORDER — ESOMEPRAZOLE MAGNESIUM 40 MG/1
40 CAPSULE, DELAYED RELEASE ORAL DAILY
Qty: 90 CAPSULE | Refills: 2 | OUTPATIENT
Start: 2025-01-21

## 2025-02-11 RX ORDER — SEMAGLUTIDE 0.5 MG/.5ML
0.5 INJECTION, SOLUTION SUBCUTANEOUS WEEKLY
Qty: 2 ML | Refills: 2 | Status: SHIPPED | OUTPATIENT
Start: 2025-02-11

## 2025-02-18 RX ORDER — CHOLECALCIFEROL (VITAMIN D3) 50 MCG
2 TABLET ORAL DAILY
Qty: 180 TABLET | Refills: 3 | Status: SHIPPED | OUTPATIENT
Start: 2025-02-18

## 2025-03-05 ENCOUNTER — PATIENT MESSAGE (OUTPATIENT)
Dept: FAMILY MEDICINE CLINIC | Facility: CLINIC | Age: 65
End: 2025-03-05
Payer: COMMERCIAL

## 2025-03-05 RX ORDER — SEMAGLUTIDE 1 MG/.5ML
1 INJECTION, SOLUTION SUBCUTANEOUS WEEKLY
Qty: 2 ML | Refills: 5 | Status: SHIPPED | OUTPATIENT
Start: 2025-03-05

## 2025-04-03 ENCOUNTER — HOSPITAL ENCOUNTER (OUTPATIENT)
Dept: MAMMOGRAPHY | Facility: HOSPITAL | Age: 65
Discharge: HOME | End: 2025-04-03
Payer: MEDICARE

## 2025-04-03 ENCOUNTER — HOSPITAL ENCOUNTER (OUTPATIENT)
Dept: BONE DENSITY | Facility: HOSPITAL | Age: 65
Discharge: HOME | End: 2025-04-03
Payer: MEDICARE

## 2025-04-03 PROCEDURE — 77080 DXA BONE DENSITY AXIAL: CPT

## 2025-04-03 PROCEDURE — 77067 SCR MAMMO BI INCL CAD: CPT

## 2025-04-03 PROCEDURE — 77063 BREAST TOMOSYNTHESIS BI: CPT

## 2025-04-04 ENCOUNTER — RESULTS FOLLOW-UP (OUTPATIENT)
Dept: FAMILY MEDICINE CLINIC | Facility: CLINIC | Age: 65
End: 2025-04-04
Payer: MEDICARE

## 2025-04-13 RX ORDER — VALACYCLOVIR HYDROCHLORIDE 500 MG/1
500 TABLET, FILM COATED ORAL DAILY
Qty: 90 TABLET | Refills: 3 | Status: SHIPPED | OUTPATIENT
Start: 2025-04-13

## 2025-04-16 RX ORDER — ESTRADIOL 0.5 MG/1
0.5 TABLET ORAL DAILY
Qty: 90 TABLET | Refills: 0 | Status: SHIPPED | OUTPATIENT
Start: 2025-04-16

## 2025-04-16 RX ORDER — PROGESTERONE 100 MG/1
100 CAPSULE ORAL DAILY
Qty: 90 CAPSULE | Refills: 0 | Status: SHIPPED | OUTPATIENT
Start: 2025-04-16

## 2025-04-19 RX ORDER — HYDROCHLOROTHIAZIDE 25 MG/1
25 TABLET ORAL DAILY
Qty: 90 TABLET | Refills: 0 | Status: SHIPPED | OUTPATIENT
Start: 2025-04-19

## 2025-04-24 ENCOUNTER — TELEPHONE (OUTPATIENT)
Dept: GASTROENTEROLOGY | Facility: CLINIC | Age: 65
End: 2025-04-24
Payer: MEDICARE

## 2025-04-24 RX ORDER — MESALAMINE 250 MG/1
1000 CAPSULE ORAL 2 TIMES DAILY
Qty: 720 CAPSULE | Refills: 0 | Status: SHIPPED | OUTPATIENT
Start: 2025-04-24

## 2025-04-24 NOTE — TELEPHONE ENCOUNTER
Sent 90 days to [facility] in [employer].  She is due for her annual follow-up in May.  Please have her make appointment.

## 2025-04-24 NOTE — TELEPHONE ENCOUNTER
Pt left a voicemail and wants to know if she can get Pentasa script called in. She said she has been getting it through the  but that is no longer available. Please call pt.

## 2025-04-25 NOTE — TELEPHONE ENCOUNTER
Called pt and advised of Gale Crawford PA-C's note.  Pt verbalized understanding.    Pt will call back to make a f/u appt.

## 2025-06-17 ENCOUNTER — OFFICE VISIT (OUTPATIENT)
Dept: FAMILY MEDICINE CLINIC | Facility: CLINIC | Age: 65
End: 2025-06-17
Payer: MEDICARE

## 2025-06-17 VITALS
HEART RATE: 68 BPM | HEIGHT: 65 IN | OXYGEN SATURATION: 99 % | BODY MASS INDEX: 27.99 KG/M2 | SYSTOLIC BLOOD PRESSURE: 109 MMHG | TEMPERATURE: 97.9 F | WEIGHT: 168 LBS | DIASTOLIC BLOOD PRESSURE: 70 MMHG | RESPIRATION RATE: 17 BRPM

## 2025-06-17 DIAGNOSIS — K51.30 ULCERATIVE RECTOSIGMOIDITIS WITHOUT COMPLICATION: ICD-10-CM

## 2025-06-17 DIAGNOSIS — Z00.00 WELCOME TO MEDICARE PREVENTIVE VISIT: Primary | ICD-10-CM

## 2025-06-17 DIAGNOSIS — Z13.6 SCREENING FOR ISCHEMIC HEART DISEASE: ICD-10-CM

## 2025-06-17 DIAGNOSIS — F41.1 GENERALIZED ANXIETY DISORDER: ICD-10-CM

## 2025-06-17 DIAGNOSIS — L71.0 PERIORAL DERMATITIS: ICD-10-CM

## 2025-06-17 DIAGNOSIS — M85.89 OSTEOPENIA OF MULTIPLE SITES: ICD-10-CM

## 2025-06-17 DIAGNOSIS — E66.09 CLASS 1 OBESITY DUE TO EXCESS CALORIES WITH SERIOUS COMORBIDITY AND BODY MASS INDEX (BMI) OF 32.0 TO 32.9 IN ADULT: ICD-10-CM

## 2025-06-17 DIAGNOSIS — E78.2 MIXED HYPERLIPIDEMIA: ICD-10-CM

## 2025-06-17 DIAGNOSIS — E66.811 CLASS 1 OBESITY DUE TO EXCESS CALORIES WITH SERIOUS COMORBIDITY AND BODY MASS INDEX (BMI) OF 32.0 TO 32.9 IN ADULT: ICD-10-CM

## 2025-06-17 DIAGNOSIS — I10 PRIMARY HYPERTENSION: ICD-10-CM

## 2025-06-17 DIAGNOSIS — K21.9 GASTRO-ESOPHAGEAL REFLUX DISEASE WITHOUT ESOPHAGITIS: ICD-10-CM

## 2025-06-17 DIAGNOSIS — E55.9 VITAMIN D DEFICIENCY: ICD-10-CM

## 2025-06-17 DIAGNOSIS — F33.42 RECURRENT MAJOR DEPRESSIVE DISORDER, IN FULL REMISSION: ICD-10-CM

## 2025-06-17 RX ORDER — SEMAGLUTIDE 1 MG/.5ML
1 INJECTION, SOLUTION SUBCUTANEOUS WEEKLY
Qty: 2 ML | Refills: 5 | Status: SHIPPED | OUTPATIENT
Start: 2025-06-17 | End: 2025-06-17 | Stop reason: SDUPTHER

## 2025-06-17 RX ORDER — ESTRADIOL 10 UG/1
1 TABLET, FILM COATED VAGINAL 2 TIMES WEEKLY
Qty: 24 TABLET | Refills: 3 | Status: SHIPPED | OUTPATIENT
Start: 2025-06-19

## 2025-06-17 RX ORDER — SEMAGLUTIDE 1 MG/.5ML
1 INJECTION, SOLUTION SUBCUTANEOUS WEEKLY
Qty: 2 ML | Refills: 5 | Status: SHIPPED | OUTPATIENT
Start: 2025-06-17

## 2025-06-17 RX ORDER — PROGESTERONE 100 MG/1
100 CAPSULE ORAL DAILY
Qty: 90 CAPSULE | Refills: 3 | Status: SHIPPED | OUTPATIENT
Start: 2025-06-17

## 2025-06-17 RX ORDER — ESTRADIOL 0.5 MG/1
0.5 TABLET ORAL DAILY
Qty: 90 TABLET | Refills: 3 | Status: SHIPPED | OUTPATIENT
Start: 2025-06-17

## 2025-06-17 RX ORDER — VALSARTAN 40 MG/1
40 TABLET ORAL DAILY
Qty: 90 TABLET | Refills: 1 | Status: SHIPPED | OUTPATIENT
Start: 2025-06-17

## 2025-06-17 RX ORDER — HYDROCHLOROTHIAZIDE 12.5 MG/1
12.5 TABLET ORAL DAILY
Qty: 90 TABLET | Refills: 1 | Status: SHIPPED | OUTPATIENT
Start: 2025-06-17

## 2025-06-17 NOTE — PROGRESS NOTES
Subjective   The ABCs of the Annual Wellness Visit  Medicare Wellness Visit      Rakel Velarde is a 64 y.o. patient who presents for a Medicare Wellness Visit.    The following portions of the patient's history were reviewed and   updated as appropriate: allergies, current medications, past family history, past medical history, past social history, past surgical history, and problem list.    Compared to one year ago, the patient's physical   health is better.  Compared to one year ago, the patient's mental   health is better.    Recent Hospitalizations:  She was not admitted to the hospital during the last year.     Current Medical Providers:  Patient Care Team:  Alda Concepcion PA-C as PCP - General (Family Medicine)  Maria Antonia Gray MD as Consulting Physician (Obstetrics and Gynecology)  Melanie Carballo MD as Consulting Physician (Gastroenterology)  Carmine Humphreys MD as Surgeon (Orthopedic Surgery)  Mt Cesar MD as Emergency Attending (Sleep Medicine)  Nitin Puentes DPM as Consulting Physician (Podiatry)    Outpatient Medications Prior to Visit   Medication Sig Dispense Refill    Calcium-Magnesium-Vitamin D (CALCIUM 500 PO) Take by mouth.      Cholecalciferol (Vitamin D3) 50 MCG (2000 UT) tablet Take 2 tablets by mouth Daily. 180 tablet 3    citalopram (CeleXA) 20 MG tablet Take 1 tablet by mouth Daily. For stress 90 tablet 3    esomeprazole (nexIUM) 40 MG capsule TAKE 1 CAPSULE BY MOUTH EVERY DAY 90 capsule 2    estradiol (ESTRACE) 0.5 MG tablet TAKE 1 TABLET BY MOUTH DAILY. WITH PROMETRIUM 90 tablet 0    fluticasone (CUTIVATE) 0.05 % cream APPLY TOPICALLY TO THE APPROPRIATE AREA AS DIRECTED 2 (TWO) TIMES A DAY. 60 g 5    hydroCHLOROthiazide 25 MG tablet TAKE 1 TABLET BY MOUTH DAILY FOR BLOOD PRESSURE 90 tablet 0    hyoscyamine (LEVSIN) 0.125 MG SL tablet PLACE 1 TABLET UNDER THE TONGUE EVERY 6 (SIX) HOURS AS NEEDED FOR CRAMPING OR DIARRHEA. 90 tablet 1    mesalamine (Pentasa) 250 MG CR  capsule Take 4 capsules by mouth 2 (Two) Times a Day. 720 capsule 0    nystatin-triamcinolone (MYCOLOG) 776742-7.1 UNIT/GM-% ointment Apply 1 Application topically to the appropriate area as directed 2 (Two) Times a Day. Perioral dermatitis as needed 60 g 5    Progesterone (PROMETRIUM) 100 MG capsule TAKE 1 CAPSULE BY MOUTH DAILY. WITH ESTRACE 90 capsule 0    promethazine-dextromethorphan (PROMETHAZINE-DM) 6.25-15 MG/5ML syrup Take 5 mL by mouth 4 (Four) Times a Day As Needed for Cough. 180 mL 1    Scopolamine 1 MG/3DAYS patch Place 1 patch on the skin as directed by provider Every 72 (Seventy-Two) Hours. 4 patch 1    valACYclovir (VALTREX) 500 MG tablet TAKE 1 TABLET BY MOUTH DAILY. FOR SUPPRESSION 90 tablet 3    valsartan (Diovan) 40 MG tablet Take 1 tablet by mouth Daily. 90 tablet 1    Yuvafem 10 MCG tablet vaginal tablet INSERT 1 TABLET INTO THE VAGINA 2 (TWO) TIMES A WEEK. FOR ATROPHY 24 tablet 3    Semaglutide-Weight Management (Wegovy) 1 MG/0.5ML solution auto-injector Inject 0.5 mL under the skin into the appropriate area as directed 1 (One) Time Per Week. 1mg SQ once weekly for obesity 2 mL 5     No facility-administered medications prior to visit.     No opioid medication identified on active medication list. I have reviewed chart for other potential  high risk medication/s and harmful drug interactions in the elderly.      Aspirin is not on active medication list.  Aspirin use is not indicated based on review of current medical condition/s. Risk of harm outweighs potential benefits.  .    Patient Active Problem List   Diagnosis    Anxiety and depression    Gastro-esophageal reflux disease without esophagitis    Osteopenia    Obesity (BMI 30-39.9)    Ulcerative colitis    Vitamin D deficiency    Hyperlipidemia    Anxiety    Hypertension    HSV infection    Hallux valgus    Ulcerative rectosigmoiditis with rectal bleeding    Diarrhea due to malabsorption    Intrinsic eczema    Perioral dermatitis    Lichen  "planus    Family history of esophageal cancer    Class 1 obesity due to excess calories with serious comorbidity and body mass index (BMI) of 32.0 to 32.9 in adult     Advance Care Planning Advance Directive is not on file.  ACP discussion was held with the patient during this visit. Patient does not have an advance directive, information provided.            Objective   Vitals:    25 1259   BP: 104/78   BP Location: Left arm   Patient Position: Sitting   Cuff Size: Adult   Pulse: 68   Resp: 17   Temp: 97.9 °F (36.6 °C)   TempSrc: Oral   SpO2: 99%   Weight: 76.2 kg (168 lb)   Height: 165.1 cm (65\")   PainSc: 0-No pain       Estimated body mass index is 27.96 kg/m² as calculated from the following:    Height as of this encounter: 165.1 cm (65\").    Weight as of this encounter: 76.2 kg (168 lb).     Gait and Balance Evaluation:  Normal             Does the patient have evidence of cognitive impairment? Yes                                                                                               Health  Risk Assessment    Smoking Status:  Social History     Tobacco Use   Smoking Status Never    Passive exposure: Never   Smokeless Tobacco Never     Alcohol Consumption:  Social History     Substance and Sexual Activity   Alcohol Use Yes    Alcohol/week: 4.0 standard drinks of alcohol    Types: 4 Glasses of wine per week    Comment: socially       Fall Risk Screen  STEADI Fall Risk Assessment was completed, and patient is at LOW risk for falls.Assessment completed on:2025    Depression Screening   Little interest or pleasure in doing things? Not at all   Feeling down, depressed, or hopeless? Not at all   PHQ-2 Total Score 0      Health Habits and Functional and Cognitive Screenin/17/2025     1:02 PM   Functional & Cognitive Status   Do you have difficulty preparing food and eating? No   Do you have difficulty bathing yourself, getting dressed or grooming yourself? No   Do you have difficulty using " the toilet? No   Do you have difficulty moving around from place to place? No   Do you have trouble with steps or getting out of a bed or a chair? No   Current Diet Well Balanced Diet   Dental Exam Up to date   Eye Exam Up to date   Exercise (times per week) 2 times per week   Current Exercises Include Walking   Do you need help using the phone?  No   Are you deaf or do you have serious difficulty hearing?  No   Do you need help to go to places out of walking distance? No   Do you need help shopping? No   Do you need help preparing meals?  No   Do you need help with housework?  No   Do you need help with laundry? No   Do you need help taking your medications? No   Do you need help managing money? No   Do you ever drive or ride in a car without wearing a seat belt? No   Have you felt unusual stress, anger or loneliness in the last month? No   Who do you live with? Child   If you need help, do you have trouble finding someone available to you? No   Have you been bothered in the last four weeks by sexual problems? No   Do you have difficulty concentrating, remembering or making decisions? No           Age-appropriate Screening Schedule:  Refer to the list below for future screening recommendations based on patient's age, sex and/or medical conditions. Orders for these recommended tests are listed in the plan section. The patient has been provided with a written plan.    Health Maintenance List  Health Maintenance   Topic Date Due    Pneumococcal Vaccine 50+ (1 of 1 - PCV) Never done    ANNUAL WELLNESS VISIT  Never done    ZOSTER VACCINE (3 of 3) 05/22/2019    COVID-19 Vaccine (9 - 2024-25 season) 04/08/2025    INFLUENZA VACCINE  07/01/2025    GASTROSCOPY (EGD)  08/19/2025    LIPID PANEL  12/11/2025    TDAP/TD VACCINES (3 - Td or Tdap) 01/21/2026    COLORECTAL CANCER SCREENING  05/06/2026    MAMMOGRAM  04/03/2027    HEPATITIS C SCREENING  Completed    PAP SMEAR  Discontinued    HEMOGLOBIN A1C  Discontinued    DXA SCAN   Discontinued                                                                                                                                                CMS Preventative Services Quick Reference  Risk Factors Identified During Encounter  Immunizations Discussed/Encouraged: Prevnar 20 (Pneumococcal 20-valent conjugate)    The above risks/problems have been discussed with the patient.  Pertinent information has been shared with the patient in the After Visit Summary.  An After Visit Summary and PPPS were made available to the patient.    Follow Up:   Next Medicare Wellness visit to be scheduled in 1 year.     Assessment & Plan         Follow Up:   No follow-ups on file.      Also advised more weightbearing exercise

## 2025-06-17 NOTE — PROGRESS NOTES
"Subjective   Rakel THOMAS Velarde is a 64 y.o. female.     History of Present Illness    Since the last visit, she has overall felt well.  She has GERD controlled on PPI Rx, Hyperlipidemia and working on this with diet and exercise, Vitamin D deficiency and labs are at goal >30 ng/mL, and primary hypertension and with her excellent weight loss with the semaglutide I need to lower her hydrochlorothiazide dose and need blood pressure at least 115 systolic but less than 140.  she has been compliant with current medications have reviewed them.  The patient denies medication side effects.  Will refill medications. /70   Pulse 68   Temp 97.9 °F (36.6 °C) (Oral)   Resp 17   Ht 165.1 cm (65\")   Wt 76.2 kg (168 lb)   LMP  (LMP Unknown)   SpO2 99%   BMI 27.96 kg/m² .        She just updated her mammogram and bone density in April that was on 4/3/2025 negative mammogram and her bone density with osteopenia and did have some worsening but is still on oral estrogen and is osteopenic not osteoporosis... No family history of osteoporosis or osteoporotic fractures and will discontinue to observe and encourage more weightbearing exercise and make sure vitamin D stays above 30 on lab..... And keep her on HRT and she wants to stay on HRT.  Results for orders placed or performed in visit on 12/01/24   Comprehensive metabolic panel    Collection Time: 12/11/24  9:04 AM    Specimen: Blood   Result Value Ref Range    Glucose 88 65 - 99 mg/dL    BUN 12 8 - 23 mg/dL    Creatinine 0.71 0.57 - 1.00 mg/dL    EGFR Result 94.5 >60.0 mL/min/1.73    BUN/Creatinine Ratio 16.9 7.0 - 25.0    Sodium 133 (L) 136 - 145 mmol/L    Potassium 3.9 3.5 - 5.2 mmol/L    Chloride 93 (L) 98 - 107 mmol/L    Total CO2 27.4 22.0 - 29.0 mmol/L    Calcium 9.8 8.6 - 10.5 mg/dL    Total Protein 7.4 6.0 - 8.5 g/dL    Albumin 4.0 3.5 - 5.2 g/dL    Globulin 3.4 gm/dL    A/G Ratio 1.2 g/dL    Total Bilirubin 0.4 0.0 - 1.2 mg/dL    Alkaline Phosphatase 70 39 - 117 " U/L    AST (SGOT) 19 1 - 32 U/L    ALT (SGPT) 21 1 - 33 U/L   Lipid panel    Collection Time: 12/11/24  9:04 AM    Specimen: Blood   Result Value Ref Range    Total Cholesterol 199 0 - 200 mg/dL    Triglycerides 133 0 - 150 mg/dL    HDL Cholesterol 47 40 - 60 mg/dL    VLDL Cholesterol Evgeny 24 5 - 40 mg/dL    LDL Chol Calc (NIH) 128 (H) 0 - 100 mg/dL   TSH    Collection Time: 12/11/24  9:04 AM    Specimen: Blood   Result Value Ref Range    TSH 1.980 0.270 - 4.200 uIU/mL   Hemoglobin A1c    Collection Time: 12/11/24  9:04 AM    Specimen: Blood   Result Value Ref Range    Hemoglobin A1C 5.00 4.80 - 5.60 %   Magnesium    Collection Time: 12/11/24  9:04 AM    Specimen: Blood   Result Value Ref Range    Magnesium 1.9 1.6 - 2.4 mg/dL   T4, Free    Collection Time: 12/11/24  9:04 AM    Specimen: Blood   Result Value Ref Range    Free T4 1.17 0.92 - 1.68 ng/dL   Vitamin D,25-Hydroxy    Collection Time: 12/11/24  9:04 AM    Specimen: Blood   Result Value Ref Range    25 Hydroxy, Vitamin D 65.0 30.0 - 100.0 ng/mL   Vitamin B12    Collection Time: 12/11/24  9:04 AM    Specimen: Blood   Result Value Ref Range    Vitamin B-12 1,051 (H) 211 - 946 pg/mL   Folate    Collection Time: 12/11/24  9:04 AM    Specimen: Blood   Result Value Ref Range    Folate 14.50 4.78 - 24.20 ng/mL   Microscopic Examination -    Collection Time: 12/11/24  9:04 AM   Result Value Ref Range    WBC, UA 0-2 /HPF    RBC, UA Comment /HPF    Epithelial Cells (non renal) 7-12 (A) /HPF    Cast Type Comment     Bacteria, UA Comment None Seen /HPF    Yeast, UA See below: (A) None Seen /HPF   CBC and Differential    Collection Time: 12/11/24  9:04 AM    Specimen: Blood   Result Value Ref Range    WBC 7.46 3.40 - 10.80 10*3/mm3    RBC 4.24 3.77 - 5.28 10*6/mm3    Hemoglobin 13.2 12.0 - 15.9 g/dL    Hematocrit 39.6 34.0 - 46.6 %    MCV 93.4 79.0 - 97.0 fL    MCH 31.1 26.6 - 33.0 pg    MCHC 33.3 31.5 - 35.7 g/dL    RDW 13.3 12.3 - 15.4 %    Platelets 337 140 - 450  10*3/mm3    Neutrophil Rel % 50.6 42.7 - 76.0 %    Lymphocyte Rel % 39.8 19.6 - 45.3 %    Monocyte Rel % 6.6 5.0 - 12.0 %    Eosinophil Rel % 2.0 0.3 - 6.2 %    Basophil Rel % 0.7 0.0 - 1.5 %    Neutrophils Absolute 3.78 1.70 - 7.00 10*3/mm3    Lymphocytes Absolute 2.97 0.70 - 3.10 10*3/mm3    Monocytes Absolute 0.49 0.10 - 0.90 10*3/mm3    Eosinophils Absolute 0.15 0.00 - 0.40 10*3/mm3    Basophils Absolute 0.05 0.00 - 0.20 10*3/mm3    Immature Granulocyte Rel % 0.3 0.0 - 0.5 %    Immature Grans Absolute 0.02 0.00 - 0.05 10*3/mm3    nRBC 0.0 0.0 - 0.2 /100 WBC   Urinalysis With Microscopic - Urine, Clean Catch    Collection Time: 12/11/24  9:04 AM    Specimen: Urine, Clean Catch   Result Value Ref Range    Specific Gravity, UA 1.015 1.005 - 1.030    pH, UA 7.0 5.0 - 8.0    Color, UA Yellow     Appearance, UA Clear Clear    Leukocytes, UA Negative Negative    Protein Negative Negative    Glucose, UA Negative Negative    Ketones Negative Negative    Blood, UA Negative Negative    Bilirubin, UA Negative Negative    Urobilinogen, UA Comment     Nitrite, UA Negative Negative     Note that she has been in prediabetes range with her A1c at 5.8% several years ago and with the semaglutide and weight loss her A1c is now back in normal range  Continue Mycolog for perioral dermatitis works well  Continues on Valtrex for HSV suppression works well  Walking for exercise  Sees derm---saw DR Honey Varela---f/u 1 yr also note her brother had melanoma    Celexa still working well for anxiety and depression.  Has ulcerative colitis--on Pentasa----sees Gale Crawford PA-C  Also on PPI for GERD  Use Cpap/Bipap every night    The 10-year ASCVD risk score (Cesilia WYATT, et al., 2019) is: 5.8%    Values used to calculate the score:      Age: 64 years      Sex: Female      Is Non- : No      Diabetic: No      Tobacco smoker: No      Systolic Blood Pressure: 109 mmHg      Is BP treated: Yes      HDL Cholesterol: 47  mg/dL      Total Cholesterol: 199 mg/dL    The following portions of the patient's history were reviewed and updated as appropriate: allergies, current medications, past family history, past medical history, past social history, past surgical history, and problem list.    Review of Systems   Constitutional:  Negative for diaphoresis.   HENT:  Negative for nosebleeds and trouble swallowing.    Eyes:  Negative for blurred vision and visual disturbance.   Respiratory:  Negative for choking.    Gastrointestinal:  Negative for blood in stool.   Allergic/Immunologic: Negative for immunocompromised state.   Neurological:  Negative for facial asymmetry and speech difficulty.   Psychiatric/Behavioral:  Negative for self-injury and suicidal ideas.        Objective   Physical Exam  Vitals and nursing note reviewed.   Constitutional:       General: She is not in acute distress.     Appearance: Normal appearance. She is well-developed. She is not ill-appearing or toxic-appearing.   HENT:      Head: Normocephalic.      Right Ear: External ear normal.      Left Ear: External ear normal.      Nose: Nose normal.      Mouth/Throat:      Pharynx: Oropharynx is clear.   Eyes:      General: No scleral icterus.     Conjunctiva/sclera: Conjunctivae normal.      Pupils: Pupils are equal, round, and reactive to light.   Neck:      Thyroid: No thyromegaly.   Cardiovascular:      Rate and Rhythm: Normal rate and regular rhythm.      Pulses: Normal pulses.      Heart sounds: Normal heart sounds. No murmur heard.  Pulmonary:      Effort: Pulmonary effort is normal. No respiratory distress.      Breath sounds: Normal breath sounds. No rales.   Musculoskeletal:         General: No deformity. Normal range of motion.      Cervical back: Normal range of motion and neck supple.      Right lower leg: No edema.      Left lower leg: No edema.   Skin:     General: Skin is warm and dry.      Findings: No rash.   Neurological:      General: No focal  deficit present.      Mental Status: She is alert and oriented to person, place, and time. Mental status is at baseline.   Psychiatric:         Mood and Affect: Mood normal.         Behavior: Behavior normal.         Thought Content: Thought content normal.         Judgment: Judgment normal.           Assessment & Plan   Diagnoses and all orders for this visit:    1. Welcome to Medicare preventive visit (Primary)  -     ECG 12 Lead  -     Comprehensive metabolic panel; Future  -     Lipid panel; Future  -     CBC and Differential; Future  -     TSH; Future  -     T4, Free; Future  -     Vitamin D,25-Hydroxy; Future  -     Vitamin B12; Future  -     Folate; Future  -     Urinalysis With Microscopic - Urine, Clean Catch; Future  -     Magnesium; Future    2. Primary hypertension  -     Comprehensive metabolic panel; Future  -     Lipid panel; Future  -     CBC and Differential; Future  -     TSH; Future  -     T4, Free; Future  -     Vitamin D,25-Hydroxy; Future  -     Vitamin B12; Future  -     Folate; Future  -     Urinalysis With Microscopic - Urine, Clean Catch; Future  -     Magnesium; Future    3. Mixed hyperlipidemia  -     Comprehensive metabolic panel; Future  -     Lipid panel; Future  -     CBC and Differential; Future  -     TSH; Future  -     T4, Free; Future  -     Vitamin D,25-Hydroxy; Future  -     Vitamin B12; Future  -     Folate; Future  -     Urinalysis With Microscopic - Urine, Clean Catch; Future  -     Magnesium; Future    4. Screening for ischemic heart disease  -     CT Cardiac Calcium Score Without Dye; Future  -     Comprehensive metabolic panel; Future  -     Lipid panel; Future  -     CBC and Differential; Future  -     TSH; Future  -     T4, Free; Future  -     Vitamin D,25-Hydroxy; Future  -     Vitamin B12; Future  -     Folate; Future  -     Urinalysis With Microscopic - Urine, Clean Catch; Future  -     Magnesium; Future    5. Class 1 obesity due to excess calories with serious  comorbidity and body mass index (BMI) of 32.0 to 32.9 in adult  -     Comprehensive metabolic panel; Future  -     Lipid panel; Future  -     CBC and Differential; Future  -     TSH; Future  -     T4, Free; Future  -     Vitamin D,25-Hydroxy; Future  -     Vitamin B12; Future  -     Folate; Future  -     Urinalysis With Microscopic - Urine, Clean Catch; Future  -     Magnesium; Future    6. Osteopenia of multiple sites  -     Comprehensive metabolic panel; Future  -     Lipid panel; Future  -     CBC and Differential; Future  -     TSH; Future  -     T4, Free; Future  -     Vitamin D,25-Hydroxy; Future  -     Vitamin B12; Future  -     Folate; Future  -     Urinalysis With Microscopic - Urine, Clean Catch; Future  -     Magnesium; Future    7. Gastro-esophageal reflux disease without esophagitis  -     Comprehensive metabolic panel; Future  -     Lipid panel; Future  -     CBC and Differential; Future  -     TSH; Future  -     T4, Free; Future  -     Vitamin D,25-Hydroxy; Future  -     Vitamin B12; Future  -     Folate; Future  -     Urinalysis With Microscopic - Urine, Clean Catch; Future  -     Magnesium; Future    8. Vitamin D deficiency  -     Comprehensive metabolic panel; Future  -     Lipid panel; Future  -     CBC and Differential; Future  -     TSH; Future  -     T4, Free; Future  -     Vitamin D,25-Hydroxy; Future  -     Vitamin B12; Future  -     Folate; Future  -     Urinalysis With Microscopic - Urine, Clean Catch; Future  -     Magnesium; Future    9. Ulcerative rectosigmoiditis without complication  -     Comprehensive metabolic panel; Future  -     Lipid panel; Future  -     CBC and Differential; Future  -     TSH; Future  -     T4, Free; Future  -     Vitamin D,25-Hydroxy; Future  -     Vitamin B12; Future  -     Folate; Future  -     Urinalysis With Microscopic - Urine, Clean Catch; Future  -     Magnesium; Future    10. Generalized anxiety disorder  -     Comprehensive metabolic panel; Future  -      Lipid panel; Future  -     CBC and Differential; Future  -     TSH; Future  -     T4, Free; Future  -     Vitamin D,25-Hydroxy; Future  -     Vitamin B12; Future  -     Folate; Future  -     Urinalysis With Microscopic - Urine, Clean Catch; Future  -     Magnesium; Future    11. Recurrent major depressive disorder, in full remission  -     Comprehensive metabolic panel; Future  -     Lipid panel; Future  -     CBC and Differential; Future  -     TSH; Future  -     T4, Free; Future  -     Vitamin D,25-Hydroxy; Future  -     Vitamin B12; Future  -     Folate; Future  -     Urinalysis With Microscopic - Urine, Clean Catch; Future  -     Magnesium; Future    12. Perioral dermatitis    Other orders  -     Discontinue: Semaglutide-Weight Management (Wegovy) 1 MG/0.5ML solution auto-injector; Inject 0.5 mL under the skin into the appropriate area as directed 1 (One) Time Per Week. 1mg SQ once weekly for obesity  Dispense: 2 mL; Refill: 5  -     Semaglutide-Weight Management (Wegovy) 1 MG/0.5ML solution auto-injector; Inject 0.5 mL under the skin into the appropriate area as directed 1 (One) Time Per Week. 1mg SQ once weekly for obesity  Dispense: 2 mL; Refill: 5  -     estradiol (ESTRACE) 0.5 MG tablet; Take 1 tablet by mouth Daily. With Prometrium  Dispense: 90 tablet; Refill: 3  -     Progesterone (PROMETRIUM) 100 MG capsule; Take 1 capsule by mouth Daily. With Estrace  Dispense: 90 capsule; Refill: 3  -     estradiol (Yuvafem) 10 MCG tablet vaginal tablet; Insert 1 tablet into the vagina 2 (Two) Times a Week. For atrophy  Dispense: 24 tablet; Refill: 3  -     hydroCHLOROthiazide 12.5 MG tablet; Take 1 tablet by mouth Daily. For blood pressure, new dose  Dispense: 90 tablet; Refill: 1  -     Pneumococcal Conjugate Vaccine 20-Valent All  -     valsartan (Diovan) 40 MG tablet; Take 1 tablet by mouth Daily.  Dispense: 90 tablet; Refill: 1        Will continue HRT also treating her osteopenia in discussed more  weightbearing exercise due to slight worsening on her last scan and watching vitamin D will continue to check labs yearly  Can repeat Pap she had inflammation on last Pap had HPV negative  Continue semaglutide for treatment of obesity working fantastic has lost about 30 pounds and keeping it off  Use Cpap/Bipap every night  --- Has follow-up with sleep medicine clinic   Cholesterol ratio is less than 7.5% and we will have her do calcium cardiac CT scan to screen for heart disease also advise carotid Doppler screening to screen for stroke risk with [facility]  Sees Dr. Honey Varela dermatology for yearly skin check  Sees GI--- remains on daily medication for ulcerative colitis through [facility]  Continue Celexa for anxiety and depression working well  Plan, Rakel Velarde, was seen today.  she was seen for GERD and will continue on PPI medication, Hyperlipidemia and will work on this with diet and exercise, Vitamin D deficiency and supplemented, and primary hypertension and lowering dose of hydrochlorothiazide blood pressure is a bit too low with her excellent weight loss will decrease to 12.5 mg needs systolic at least 115 but less than 140 she will check blood pressure at home after a week on the new dose and message me periodically.  Continue Mycolog for perioral dermatitis works well  Continues on Valtrex for HSV suppression works well

## 2025-06-17 NOTE — PROGRESS NOTES
Procedure     ECG 12 Lead    Date/Time: 6/17/2025 1:46 PM  Performed by: Alda Concepcion PA-C    Authorized by: Alda Concepcion PA-C  Comparison: compared with previous ECG from 3/15/2023  Similar to previous ECG  Rhythm: sinus rhythm  Rate: normal  BPM: 64  T Waves: T waves normal  QRS axis: normal  Other findings: low voltage    Clinical impression: normal ECG  Comments: EKG Interpretation Report    Heart rate:    64 beats/min, WV interval:  156 msec, QRS duration:  94 msec  QTu:412 msec, QTc:  425 msec          E

## 2025-07-10 ENCOUNTER — SPECIALTY PHARMACY (OUTPATIENT)
Dept: GASTROENTEROLOGY | Facility: CLINIC | Age: 65
End: 2025-07-10
Payer: MEDICARE

## 2025-07-10 ENCOUNTER — OFFICE VISIT (OUTPATIENT)
Dept: GASTROENTEROLOGY | Facility: CLINIC | Age: 65
End: 2025-07-10
Payer: MEDICARE

## 2025-07-10 ENCOUNTER — TELEPHONE (OUTPATIENT)
Dept: GASTROENTEROLOGY | Facility: CLINIC | Age: 65
End: 2025-07-10

## 2025-07-10 VITALS
HEIGHT: 65 IN | BODY MASS INDEX: 27.12 KG/M2 | TEMPERATURE: 97.8 F | WEIGHT: 162.8 LBS | HEART RATE: 71 BPM | SYSTOLIC BLOOD PRESSURE: 124 MMHG | DIASTOLIC BLOOD PRESSURE: 80 MMHG

## 2025-07-10 DIAGNOSIS — K51.30 ULCERATIVE RECTOSIGMOIDITIS WITHOUT COMPLICATION: Primary | ICD-10-CM

## 2025-07-10 DIAGNOSIS — Z80.0 FAMILY HISTORY OF ESOPHAGEAL CANCER: ICD-10-CM

## 2025-07-10 DIAGNOSIS — K58.2 IRRITABLE BOWEL SYNDROME WITH BOTH CONSTIPATION AND DIARRHEA: ICD-10-CM

## 2025-07-10 DIAGNOSIS — K22.70 BARRETT'S ESOPHAGUS WITHOUT DYSPLASIA: ICD-10-CM

## 2025-07-10 RX ORDER — MESALAMINE 250 MG/1
1000 CAPSULE ORAL 2 TIMES DAILY
Qty: 720 CAPSULE | Refills: 3 | Status: SHIPPED | OUTPATIENT
Start: 2025-07-10

## 2025-07-10 RX ORDER — ESOMEPRAZOLE MAGNESIUM 40 MG/1
40 CAPSULE, DELAYED RELEASE ORAL DAILY
Qty: 90 CAPSULE | Refills: 3 | Status: SHIPPED | OUTPATIENT
Start: 2025-07-10

## 2025-07-10 NOTE — TELEPHONE ENCOUNTER
Patient has been on Pentasa for quite some time and is well-controlled with recent colonoscopy showing no evidence of disease.  I would really prefer her to stay on the Pentasa and not risk flaring if we switch her.    I reviewed the records, she has failed balsalazide back in 2021.  She did well on Lialda however insurance did not like this so that is when she was switched to Pentasa -September 2021.    Lets do an appeal:    To whom it may concern,    Patient has a history of left-sided ulcerative colitis dating back to 2000.  She is currently on the generic for Pentasa: Mesalamine  mg 4 capsules twice daily started September 2021 due to insurance preference over generic Lialda.  She has previously failed high-dose balsalazide in 2021.    She had colonoscopy on 5/6/2024 which showed no endoscopic remission with benign random colon biopsies without active inflammation.  This shows the proven effectiveness of her current dose of mesalamine CR.  As you know she is at high risk of colon cancer, particularly if her inflammation were to return.  She would also be at higher risk of escalating drug management to [facility] if her inflammation were to become uncontrolled.  I do not feel that changing her mesalamine CR for the purpose of insurance formulary's and preferences would be in her best interest.  This would put her at risk for inflammatory flare, hospitalization, and escalation of drug therapy as noted above.    For these reasons, please consider approving her current regimen of mesalamine  mg 4 capsules twice daily.    Thank you,  Gale Crawford PA-C

## 2025-07-10 NOTE — PROGRESS NOTES
Insurance prefers balsalazide, sulfasalazine, or delzicol      Michelle Camacho  7/10/2025  14:58 EDT

## 2025-07-10 NOTE — TELEPHONE ENCOUNTER
----- Message from Michelle BRONSON sent at 7/10/2025  2:57 PM EDT -----  Regarding: Pentasa PA  Insurance prefers balsalazide, sulfasalazine, or delzicol

## 2025-07-14 ENCOUNTER — SPECIALTY PHARMACY (OUTPATIENT)
Dept: GASTROENTEROLOGY | Facility: CLINIC | Age: 65
End: 2025-07-14
Payer: MEDICARE

## 2025-07-14 NOTE — PROGRESS NOTES
Pentasa appeal approved  Key: [id number]  PA Case ID #: [id number]  Effective Date: 1/1/2025  $12.15       Michelle Camacho  7/14/2025  09:25 EDT

## 2025-07-17 RX ORDER — HYDROCHLOROTHIAZIDE 25 MG/1
25 TABLET ORAL DAILY
Qty: 90 TABLET | Refills: 0 | OUTPATIENT
Start: 2025-07-17

## 2025-08-15 ENCOUNTER — OFFICE VISIT (OUTPATIENT)
Dept: SLEEP MEDICINE | Facility: HOSPITAL | Age: 65
End: 2025-08-15
Payer: MEDICARE

## 2025-08-15 VITALS — BODY MASS INDEX: 26.66 KG/M2 | HEIGHT: 65 IN | HEART RATE: 68 BPM | WEIGHT: 160 LBS | OXYGEN SATURATION: 96 %

## 2025-08-15 DIAGNOSIS — R63.4 WEIGHT LOSS: ICD-10-CM

## 2025-08-15 DIAGNOSIS — G47.33 OBSTRUCTIVE SLEEP APNEA: Primary | ICD-10-CM

## 2025-08-15 PROCEDURE — 1160F RVW MEDS BY RX/DR IN RCRD: CPT | Performed by: NURSE PRACTITIONER

## 2025-08-15 PROCEDURE — G0463 HOSPITAL OUTPT CLINIC VISIT: HCPCS

## 2025-08-15 PROCEDURE — 1159F MED LIST DOCD IN RCRD: CPT | Performed by: NURSE PRACTITIONER

## 2025-08-15 PROCEDURE — 99214 OFFICE O/P EST MOD 30 MIN: CPT | Performed by: NURSE PRACTITIONER

## (undated) DEVICE — DISPOSABLE MONOPOLAR ENDOSCOPIC CORD 10 FT. (3M): Brand: KIRWAN

## (undated) DEVICE — ENDOPATH XCEL BLADELESS TROCARS WITH STABILITY SLEEVES: Brand: ENDOPATH XCEL

## (undated) DEVICE — ADAPT CLN BIOGUARD AIR/H2O DISP

## (undated) DEVICE — THE TORRENT IRRIGATION SCOPE CONNECTOR IS USED WITH THE TORRENT IRRIGATION TUBING TO PROVIDE IRRIGATION FLUIDS SUCH AS STERILE WATER DURING GASTROINTESTINAL ENDOSCOPIC PROCEDURES WHEN USED IN CONJUNCTION WITH AN IRRIGATION PUMP (OR ELECTROSURGICAL UNIT).: Brand: TORRENT

## (undated) DEVICE — LN SMPL CO2 SHTRM SD STREAM W/M LUER

## (undated) DEVICE — SINGLE-USE BIOPSY FORCEPS: Brand: RADIAL JAW 4

## (undated) DEVICE — LAPAROSCOPIC SMOKE FILTRATION SYSTEM: Brand: PALL LAPAROSHIELD® PLUS LAPAROSCOPIC SMOKE FILTRATION SYSTEM

## (undated) DEVICE — CANN NASL CO2 TRULINK W/O2 A/

## (undated) DEVICE — APPL CHLORAPREP HI/LITE 26ML ORNG

## (undated) DEVICE — ENDOCUT SCISSOR TIP, DISPOSABLE: Brand: RENEW

## (undated) DEVICE — BITEBLOCK OMNI BLOC

## (undated) DEVICE — SUT VIC 0/0 UR6 27IN DYED J603H

## (undated) DEVICE — APPL HEMO SURG ARISTA/AH/FLEXITIP XL 38CM

## (undated) DEVICE — ELECTRD BLD EZ CLN MOD XLNG 2.75IN

## (undated) DEVICE — TUBING, SUCTION, 1/4" X 10', STRAIGHT: Brand: MEDLINE

## (undated) DEVICE — CANN O2 ETCO2 FITS ALL CONN CO2 SMPL A/ 7IN DISP LF

## (undated) DEVICE — ADHS SKIN SURG TISS VISC PREMIERPRO EXOFIN HI/VISC FAST/DRY

## (undated) DEVICE — GLV SURG BIOGEL LTX PF 7 1/2

## (undated) DEVICE — TROCAR SITE CLOSURE DEVICE: Brand: ENDO CLOSE

## (undated) DEVICE — LOU LAP CHOLE: Brand: MEDLINE INDUSTRIES, INC.

## (undated) DEVICE — KT CLN CLEANOR SCPE

## (undated) DEVICE — ENDOPATH XCEL UNIVERSAL TROCAR STABLILITY SLEEVES: Brand: ENDOPATH XCEL

## (undated) DEVICE — SENSR O2 OXIMAX FNGR A/ 18IN NONSTR

## (undated) DEVICE — STPLR SKIN VISISTAT WD 35CT

## (undated) DEVICE — KT ORCA ORCAPOD DISP STRL

## (undated) DEVICE — FRCP BX RADJAW4 NDL 2.8 240CM LG OG BX40

## (undated) DEVICE — Device: Brand: DEFENDO AIR/WATER/SUCTION AND BIOPSY VALVE

## (undated) DEVICE — 2, DISPOSABLE SUCTION/IRRIGATOR WITH DISPOSABLE TIP: Brand: STRYKEFLOW

## (undated) DEVICE — HARMONIC ACE +7 LAPAROSCOPIC SHEARS ADVANCED HEMOSTASIS 5MM DIAMETER 36CM SHAFT LENGTH  FOR USE WITH GRAY HAND PIECE ONLY: Brand: HARMONIC ACE

## (undated) DEVICE — SUT MNCRYL PLS ANTIB UD 4/0 PS2 18IN

## (undated) DEVICE — ENDOPOUCH RETRIEVER SPECIMEN RETRIEVAL BAGS: Brand: ENDOPOUCH RETRIEVER

## (undated) DEVICE — TOTAL TRAY, 16FR 10ML SIL FOLEY, URN: Brand: MEDLINE